# Patient Record
Sex: MALE | Race: NATIVE HAWAIIAN OR OTHER PACIFIC ISLANDER | NOT HISPANIC OR LATINO
[De-identification: names, ages, dates, MRNs, and addresses within clinical notes are randomized per-mention and may not be internally consistent; named-entity substitution may affect disease eponyms.]

---

## 2017-02-01 ENCOUNTER — OTHER (OUTPATIENT)
Age: 72
End: 2017-02-01

## 2017-03-22 ENCOUNTER — APPOINTMENT (OUTPATIENT)
Dept: SLEEP CENTER | Facility: CLINIC | Age: 72
End: 2017-03-22

## 2017-03-22 ENCOUNTER — OUTPATIENT (OUTPATIENT)
Dept: OUTPATIENT SERVICES | Facility: HOSPITAL | Age: 72
LOS: 1 days | End: 2017-03-22
Payer: MEDICARE

## 2017-03-22 DIAGNOSIS — Z98.89 OTHER SPECIFIED POSTPROCEDURAL STATES: Chronic | ICD-10-CM

## 2017-03-22 DIAGNOSIS — M71.21 SYNOVIAL CYST OF POPLITEAL SPACE [BAKER], RIGHT KNEE: Chronic | ICD-10-CM

## 2017-03-22 PROCEDURE — 95811 POLYSOM 6/>YRS CPAP 4/> PARM: CPT

## 2017-03-23 DIAGNOSIS — G47.33 OBSTRUCTIVE SLEEP APNEA (ADULT) (PEDIATRIC): ICD-10-CM

## 2017-04-10 ENCOUNTER — RESULT REVIEW (OUTPATIENT)
Age: 72
End: 2017-04-10

## 2017-04-17 ENCOUNTER — APPOINTMENT (OUTPATIENT)
Dept: PULMONOLOGY | Facility: CLINIC | Age: 72
End: 2017-04-17

## 2017-04-17 VITALS
OXYGEN SATURATION: 93 % | HEIGHT: 71 IN | DIASTOLIC BLOOD PRESSURE: 100 MMHG | SYSTOLIC BLOOD PRESSURE: 180 MMHG | BODY MASS INDEX: 32.9 KG/M2 | TEMPERATURE: 97.3 F | RESPIRATION RATE: 14 BRPM | HEART RATE: 75 BPM | WEIGHT: 235 LBS

## 2017-04-17 VITALS — DIASTOLIC BLOOD PRESSURE: 82 MMHG | SYSTOLIC BLOOD PRESSURE: 156 MMHG

## 2017-04-17 VITALS — OXYGEN SATURATION: 96 %

## 2017-04-17 DIAGNOSIS — F41.9 ANXIETY DISORDER, UNSPECIFIED: ICD-10-CM

## 2017-04-17 DIAGNOSIS — G47.00 INSOMNIA, UNSPECIFIED: ICD-10-CM

## 2017-04-17 RX ORDER — FOLIC ACID 1 MG/1
1 TABLET ORAL
Refills: 0 | Status: COMPLETED | COMMUNITY
End: 2017-04-17

## 2017-04-17 RX ORDER — VENLAFAXINE HYDROCHLORIDE 75 MG/1
75 CAPSULE, EXTENDED RELEASE ORAL
Refills: 0 | Status: COMPLETED | COMMUNITY
End: 2017-04-17

## 2017-06-12 ENCOUNTER — APPOINTMENT (OUTPATIENT)
Dept: PULMONOLOGY | Facility: CLINIC | Age: 72
End: 2017-06-12

## 2017-06-12 VITALS
BODY MASS INDEX: 31.69 KG/M2 | SYSTOLIC BLOOD PRESSURE: 150 MMHG | RESPIRATION RATE: 16 BRPM | OXYGEN SATURATION: 96 % | HEART RATE: 94 BPM | WEIGHT: 234 LBS | DIASTOLIC BLOOD PRESSURE: 86 MMHG | HEIGHT: 72 IN

## 2017-07-19 ENCOUNTER — APPOINTMENT (OUTPATIENT)
Dept: PULMONOLOGY | Facility: CLINIC | Age: 72
End: 2017-07-19

## 2017-07-19 VITALS
RESPIRATION RATE: 18 BRPM | OXYGEN SATURATION: 94 % | SYSTOLIC BLOOD PRESSURE: 170 MMHG | DIASTOLIC BLOOD PRESSURE: 90 MMHG | HEART RATE: 89 BPM | WEIGHT: 232 LBS | BODY MASS INDEX: 31.42 KG/M2 | TEMPERATURE: 96.7 F | HEIGHT: 72 IN

## 2017-07-19 RX ORDER — DICLOFENAC SODIUM 20 MG/G
2 SOLUTION TOPICAL
Qty: 112 | Refills: 0 | Status: DISCONTINUED | COMMUNITY
Start: 2017-03-23 | End: 2017-07-19

## 2017-07-19 RX ORDER — TAMSULOSIN HYDROCHLORIDE 0.4 MG/1
0.4 CAPSULE ORAL
Refills: 0 | Status: DISCONTINUED | COMMUNITY
End: 2017-07-19

## 2017-07-19 RX ORDER — LORAZEPAM 1 MG/1
1 TABLET ORAL
Refills: 0 | Status: DISCONTINUED | COMMUNITY
Start: 2017-06-12 | End: 2017-07-19

## 2017-07-19 RX ORDER — NAPROXEN 500 MG/1
500 TABLET, DELAYED RELEASE ORAL
Qty: 180 | Refills: 0 | Status: DISCONTINUED | COMMUNITY
Start: 2017-03-23 | End: 2017-07-19

## 2017-08-04 ENCOUNTER — APPOINTMENT (OUTPATIENT)
Dept: PULMONOLOGY | Facility: CLINIC | Age: 72
End: 2017-08-04

## 2019-11-19 ENCOUNTER — INPATIENT (INPATIENT)
Facility: HOSPITAL | Age: 74
LOS: 2 days | Discharge: HOME | DRG: 66 | End: 2019-11-22
Attending: PSYCHIATRY & NEUROLOGY | Admitting: PSYCHIATRY & NEUROLOGY
Payer: MEDICARE

## 2019-11-19 VITALS
RESPIRATION RATE: 17 BRPM | HEART RATE: 93 BPM | OXYGEN SATURATION: 96 % | WEIGHT: 220.46 LBS | TEMPERATURE: 98 F | HEIGHT: 71 IN | SYSTOLIC BLOOD PRESSURE: 175 MMHG | DIASTOLIC BLOOD PRESSURE: 93 MMHG

## 2019-11-19 DIAGNOSIS — Z98.89 OTHER SPECIFIED POSTPROCEDURAL STATES: Chronic | ICD-10-CM

## 2019-11-19 DIAGNOSIS — M71.21 SYNOVIAL CYST OF POPLITEAL SPACE [BAKER], RIGHT KNEE: Chronic | ICD-10-CM

## 2019-11-19 LAB
ALBUMIN SERPL ELPH-MCNC: 3.8 G/DL — SIGNIFICANT CHANGE UP (ref 3.3–5)
ALP SERPL-CCNC: 89 U/L — SIGNIFICANT CHANGE UP (ref 40–120)
ALT FLD-CCNC: 21 U/L — SIGNIFICANT CHANGE UP (ref 10–45)
ANION GAP SERPL CALC-SCNC: 10 MMOL/L — SIGNIFICANT CHANGE UP (ref 5–17)
APPEARANCE UR: CLEAR — SIGNIFICANT CHANGE UP
APTT BLD: 33.4 SEC — SIGNIFICANT CHANGE UP (ref 27.5–36.3)
AST SERPL-CCNC: 19 U/L — SIGNIFICANT CHANGE UP (ref 10–40)
BACTERIA # UR AUTO: PRESENT /HPF
BASOPHILS # BLD AUTO: 0.04 K/UL — SIGNIFICANT CHANGE UP (ref 0–0.2)
BASOPHILS NFR BLD AUTO: 0.2 % — SIGNIFICANT CHANGE UP (ref 0–2)
BILIRUB SERPL-MCNC: 0.2 MG/DL — SIGNIFICANT CHANGE UP (ref 0.2–1.2)
BILIRUB UR-MCNC: NEGATIVE — SIGNIFICANT CHANGE UP
BLD GP AB SCN SERPL QL: NEGATIVE — SIGNIFICANT CHANGE UP
BUN SERPL-MCNC: 19 MG/DL — SIGNIFICANT CHANGE UP (ref 7–23)
CALCIUM SERPL-MCNC: 9.8 MG/DL — SIGNIFICANT CHANGE UP (ref 8.4–10.5)
CHLORIDE SERPL-SCNC: 101 MMOL/L — SIGNIFICANT CHANGE UP (ref 96–108)
CO2 SERPL-SCNC: 25 MMOL/L — SIGNIFICANT CHANGE UP (ref 22–31)
COLOR SPEC: YELLOW — SIGNIFICANT CHANGE UP
CREAT SERPL-MCNC: 1.06 MG/DL — SIGNIFICANT CHANGE UP (ref 0.5–1.3)
DIFF PNL FLD: NEGATIVE — SIGNIFICANT CHANGE UP
EOSINOPHIL # BLD AUTO: 0.2 K/UL — SIGNIFICANT CHANGE UP (ref 0–0.5)
EOSINOPHIL NFR BLD AUTO: 1.2 % — SIGNIFICANT CHANGE UP (ref 0–6)
EPI CELLS # UR: SIGNIFICANT CHANGE UP /HPF (ref 0–5)
GLUCOSE SERPL-MCNC: 162 MG/DL — HIGH (ref 70–99)
GLUCOSE UR QL: NEGATIVE — SIGNIFICANT CHANGE UP
HCT VFR BLD CALC: 40.9 % — SIGNIFICANT CHANGE UP (ref 39–50)
HGB BLD-MCNC: 13 G/DL — SIGNIFICANT CHANGE UP (ref 13–17)
IMM GRANULOCYTES NFR BLD AUTO: 0.5 % — SIGNIFICANT CHANGE UP (ref 0–1.5)
INR BLD: 1.04 — SIGNIFICANT CHANGE UP (ref 0.88–1.16)
KETONES UR-MCNC: NEGATIVE — SIGNIFICANT CHANGE UP
LEUKOCYTE ESTERASE UR-ACNC: NEGATIVE — SIGNIFICANT CHANGE UP
LYMPHOCYTES # BLD AUTO: 19.7 % — SIGNIFICANT CHANGE UP (ref 13–44)
LYMPHOCYTES # BLD AUTO: 3.28 K/UL — SIGNIFICANT CHANGE UP (ref 1–3.3)
MCHC RBC-ENTMCNC: 27.7 PG — SIGNIFICANT CHANGE UP (ref 27–34)
MCHC RBC-ENTMCNC: 31.8 GM/DL — LOW (ref 32–36)
MCV RBC AUTO: 87 FL — SIGNIFICANT CHANGE UP (ref 80–100)
MONOCYTES # BLD AUTO: 1.19 K/UL — HIGH (ref 0–0.9)
MONOCYTES NFR BLD AUTO: 7.1 % — SIGNIFICANT CHANGE UP (ref 2–14)
NEUTROPHILS # BLD AUTO: 11.88 K/UL — HIGH (ref 1.8–7.4)
NEUTROPHILS NFR BLD AUTO: 71.3 % — SIGNIFICANT CHANGE UP (ref 43–77)
NITRITE UR-MCNC: NEGATIVE — SIGNIFICANT CHANGE UP
NRBC # BLD: 0 /100 WBCS — SIGNIFICANT CHANGE UP (ref 0–0)
PH UR: 5.5 — SIGNIFICANT CHANGE UP (ref 5–8)
PLATELET # BLD AUTO: 386 K/UL — SIGNIFICANT CHANGE UP (ref 150–400)
POTASSIUM SERPL-MCNC: 4.2 MMOL/L — SIGNIFICANT CHANGE UP (ref 3.5–5.3)
POTASSIUM SERPL-SCNC: 4.2 MMOL/L — SIGNIFICANT CHANGE UP (ref 3.5–5.3)
PROT SERPL-MCNC: 7.7 G/DL — SIGNIFICANT CHANGE UP (ref 6–8.3)
PROT UR-MCNC: ABNORMAL MG/DL
PROTHROM AB SERPL-ACNC: 11.8 SEC — SIGNIFICANT CHANGE UP (ref 10–12.9)
RBC # BLD: 4.7 M/UL — SIGNIFICANT CHANGE UP (ref 4.2–5.8)
RBC # FLD: 14.1 % — SIGNIFICANT CHANGE UP (ref 10.3–14.5)
RBC CASTS # UR COMP ASSIST: < 5 /HPF — SIGNIFICANT CHANGE UP
RH IG SCN BLD-IMP: POSITIVE — SIGNIFICANT CHANGE UP
SODIUM SERPL-SCNC: 136 MMOL/L — SIGNIFICANT CHANGE UP (ref 135–145)
SP GR SPEC: >=1.03 — SIGNIFICANT CHANGE UP (ref 1–1.03)
UROBILINOGEN FLD QL: 0.2 E.U./DL — SIGNIFICANT CHANGE UP
WBC # BLD: 16.68 K/UL — HIGH (ref 3.8–10.5)
WBC # FLD AUTO: 16.68 K/UL — HIGH (ref 3.8–10.5)
WBC UR QL: < 5 /HPF — SIGNIFICANT CHANGE UP

## 2019-11-19 PROCEDURE — 70450 CT HEAD/BRAIN W/O DYE: CPT | Mod: 26,59

## 2019-11-19 PROCEDURE — 70498 CT ANGIOGRAPHY NECK: CPT | Mod: 26

## 2019-11-19 PROCEDURE — 93010 ELECTROCARDIOGRAM REPORT: CPT

## 2019-11-19 PROCEDURE — 70496 CT ANGIOGRAPHY HEAD: CPT | Mod: 26

## 2019-11-19 PROCEDURE — 99285 EMERGENCY DEPT VISIT HI MDM: CPT

## 2019-11-19 RX ORDER — DEXTROSE 50 % IN WATER 50 %
12.5 SYRINGE (ML) INTRAVENOUS ONCE
Refills: 0 | Status: DISCONTINUED | OUTPATIENT
Start: 2019-11-19 | End: 2019-11-22

## 2019-11-19 RX ORDER — ENOXAPARIN SODIUM 100 MG/ML
40 INJECTION SUBCUTANEOUS EVERY 24 HOURS
Refills: 0 | Status: DISCONTINUED | OUTPATIENT
Start: 2019-11-20 | End: 2019-11-22

## 2019-11-19 RX ORDER — INSULIN LISPRO 100/ML
VIAL (ML) SUBCUTANEOUS
Refills: 0 | Status: DISCONTINUED | OUTPATIENT
Start: 2019-11-19 | End: 2019-11-22

## 2019-11-19 RX ORDER — ASPIRIN/CALCIUM CARB/MAGNESIUM 324 MG
325 TABLET ORAL ONCE
Refills: 0 | Status: COMPLETED | OUTPATIENT
Start: 2019-11-19 | End: 2019-11-19

## 2019-11-19 RX ORDER — LANOLIN ALCOHOL/MO/W.PET/CERES
5 CREAM (GRAM) TOPICAL AT BEDTIME
Refills: 0 | Status: DISCONTINUED | OUTPATIENT
Start: 2019-11-19 | End: 2019-11-22

## 2019-11-19 RX ORDER — DEXTROSE 50 % IN WATER 50 %
15 SYRINGE (ML) INTRAVENOUS ONCE
Refills: 0 | Status: DISCONTINUED | OUTPATIENT
Start: 2019-11-19 | End: 2019-11-22

## 2019-11-19 RX ORDER — CLOPIDOGREL BISULFATE 75 MG/1
75 TABLET, FILM COATED ORAL EVERY 24 HOURS
Refills: 0 | Status: DISCONTINUED | OUTPATIENT
Start: 2019-11-19 | End: 2019-11-22

## 2019-11-19 RX ORDER — ATORVASTATIN CALCIUM 80 MG/1
80 TABLET, FILM COATED ORAL AT BEDTIME
Refills: 0 | Status: DISCONTINUED | OUTPATIENT
Start: 2019-11-19 | End: 2019-11-22

## 2019-11-19 RX ORDER — GLUCAGON INJECTION, SOLUTION 0.5 MG/.1ML
1 INJECTION, SOLUTION SUBCUTANEOUS ONCE
Refills: 0 | Status: DISCONTINUED | OUTPATIENT
Start: 2019-11-19 | End: 2019-11-22

## 2019-11-19 RX ORDER — ASPIRIN/CALCIUM CARB/MAGNESIUM 324 MG
81 TABLET ORAL DAILY
Refills: 0 | Status: DISCONTINUED | OUTPATIENT
Start: 2019-11-20 | End: 2019-11-22

## 2019-11-19 RX ORDER — SODIUM CHLORIDE 9 MG/ML
1000 INJECTION, SOLUTION INTRAVENOUS
Refills: 0 | Status: DISCONTINUED | OUTPATIENT
Start: 2019-11-19 | End: 2019-11-22

## 2019-11-19 RX ADMIN — Medication 325 MILLIGRAM(S): at 21:41

## 2019-11-19 RX ADMIN — CLOPIDOGREL BISULFATE 75 MILLIGRAM(S): 75 TABLET, FILM COATED ORAL at 23:26

## 2019-11-19 RX ADMIN — Medication 5 MILLIGRAM(S): at 23:27

## 2019-11-19 RX ADMIN — ATORVASTATIN CALCIUM 80 MILLIGRAM(S): 80 TABLET, FILM COATED ORAL at 23:26

## 2019-11-19 NOTE — H&P ADULT - ASSESSMENT
74M with PMH psoriatic arthritis, HTN, DM, spinal stenosis, presents with dizziness x1 week, admitted for L cerebellar infarct. 74M with PMH psoriatic arthritis, HTN, DM, spinal stenosis, presents with dizziness x1 week, admitted for acute/subacute L cerebellar infarct.

## 2019-11-19 NOTE — ED PROVIDER NOTE - CLINICAL SUMMARY MEDICAL DECISION MAKING FREE TEXT BOX
large CVA cerebellar on mri- probably PICA dist- d/w Dr Hanley- admit to him [facility]- labs/CT ordered

## 2019-11-19 NOTE — H&P ADULT - PROBLEM SELECTOR PLAN 1
Pt with symptoms of intermittent dizziness, room spinning sensation x1 week, and unsteady gait (worse than baseline, walks with cane at baseline). Of note, patient had been having episodes of n/v while on fentanyl patch, unknown if related to cerebellar infarct. MRI outpatient with large left cerebellar infarct. Neuro exam fairly unremarkable without any signficant focal neuro deficits, mild dysmetria on left side. CT angio with no flow visualized in the right distal V1 and proximal V2 segments and the left distal V2 segments, which may represent high-grade stenoses versus occlusions. No large vessel occlusion.  - c/w Aspirin 81mg, Plavix 75mg qd  - c/w lipitor 80mg   - PT/OT Pt with symptoms of intermittent dizziness, room spinning sensation x1 week, and unsteady gait (worse than baseline, walks with cane at baseline). Of note, patient had been having episodes of n/v while on fentanyl patch, unknown if related to cerebellar infarct. MRI outpatient with large left cerebellar infarct. Neuro exam fairly unremarkable without any signficant focal neuro deficits, mild dysmetria on left side. CT angio with no flow visualized in the right distal V1 and proximal V2 segments and the left distal V2 segments, which may represent high-grade stenoses versus occlusions. No large vessel occlusion.  - c/w Aspirin 81mg, Plavix 75mg qd  - c/w lipitor 80mg   - PT/OT  - f/u lipid profile, Hb A1c, and TSH Pt with symptoms of intermittent dizziness, room spinning sensation x1 week, and unsteady gait (worse than baseline, walks with cane at baseline). Of note, patient had been having episodes of n/v while on fentanyl patch, unknown if related to cerebellar infarct. MRI outpatient with large left cerebellar infarct. Neuro exam fairly unremarkable without any signficant focal neuro deficits, mild dysmetria on left side. CT angio with no flow visualized in the right distal V1 and proximal V2 segments and the left distal V2 segments, which may represent high-grade stenoses versus occlusions. No large vessel occlusion.  - c/w Aspirin 81mg, Plavix 75mg qd  - c/w lipitor 80mg   - PT/OT  - f/u lipid profile, Hb A1c, and TSH  - f/u TTE

## 2019-11-19 NOTE — H&P ADULT - PROBLEM SELECTOR PLAN 4
Patient with history of HTN, on Losartan 100mg qd at home. Blood pressures elevated on arrival to 200s, improved to 170s while sleeping.   - BP goals 160-180s   - restart Losartan 100mg in AM

## 2019-11-19 NOTE — H&P ADULT - PROBLEM SELECTOR PLAN 2
WBC 16k in the ED, patient non-toxic appearing. May be reactive in setting of CVA  - trend CBC with diff

## 2019-11-19 NOTE — H&P ADULT - ATTENDING COMMENTS
Patient discussed by phone with ED attending and [facility] resident Dr. Escobedo per above.      74/M with DM with new dizziness and lateralzied sensory change since saturday >72h ago.  Ultimately obtained outpatient MRI today showing L inferior midline cerebellar stroke, presented to ED.  Exam reportedly with NIHSS 0 and only mild ataxia and unsteadiness on walking.  CTA showing likely vertebrobasilar stenosis.  Given no significant edema after 3 days and patients age, risk of large posterior fossa swelling is low, stable for admission to stepdown level of care for stroke workup.  STart ASA, plavix, statin. SBP ~200 in ED, past permissive hypertension phase, target modest BP reduction to SBP<180.    Masoud Hanley MD  Attending Neurointensivist

## 2019-11-19 NOTE — H&P ADULT - NSHPPHYSICALEXAM_GEN_ALL_CORE
.  VITAL SIGNS:  T(C): 36.8 (11-20-19 @ 01:58), Max: 36.8 (11-19-19 @ 21:55)  T(F): 98.3 (11-20-19 @ 01:58), Max: 98.3 (11-20-19 @ 01:58)  HR: 60 (11-20-19 @ 03:49) (60 - 93)  BP: 184/77 (11-20-19 @ 03:49) (175/93 - 196/84)  BP(mean): 110 (11-20-19 @ 00:09) (110 - 110)  RR: 18 (11-20-19 @ 03:49) (17 - 18)  SpO2: 95% (11-20-19 @ 03:49) (95% - 97%)  Wt(kg): --    PHYSICAL EXAM:    Constitutional: WDWN resting comfortably in bed; NAD  Head: NC/AT  Eyes: PERRL, EOMI, anicteric sclera  ENT: no nasal discharge; uvula midline, no oropharyngeal erythema or exudates; MMM  Neck: supple; no JVD or thyromegaly  Respiratory: CTA B/L; no W/R/R, no retractions  Cardiac: +S1/S2; RRR; no M/R/G; PMI non-displaced  Gastrointestinal: soft, NT/ND; no rebound or guarding; +BSx4  Genitourinary: normal external genitalia  Back: spine midline, no bony tenderness or step-offs; no CVAT B/L  Extremities: WWP, no clubbing or cyanosis; no peripheral edema  Musculoskeletal: NROM x4; no joint swelling, tenderness or erythema  Vascular: 2+ radial, femoral, DP/PT pulses B/L  Dermatologic: skin warm, dry and intact; no rashes, wounds, or scars  Lymphatic: no submandibular or cervical LAD  Neurologic: AAOx3; CNII-XII grossly intact; no focal deficits  Neuro:  Mental status: Awake, alert and oriented x3. Follows commands. Recent and remote memory intact.  Naming, repetition and comprehension intact.  Attention/concentration intact.  No dysarthria, no aphasia.      Cranial nerves: Pupils equally round and reactive to light, visual fields full, no nystagmus, extraocular muscles intact, V1 through V3 intact bilaterally and symmetric, no facial droop, hearing intact to finger rub, palate elevation symmetric, tongue was midline, sternocleidomastoid/shoulder shrug strength bilaterally 5/5.    Motor:  No pronator drift. No fix. Normal bulk and tone, strength 5/5 in bilateral upper and lower extremities.   strength 5/5.    Sensation: Intact to light touch, proprioception, vibration, temperature, pinprick.  No neglect.   Coordination: No dysmetria on finger-to-nose and heel-to-shin.  No clumsiness. Rapid alternating movements intact and symmetric.   Reflexes:absent Babinski bilaterally  Gait: Narrow and steady. No ataxia.   Psychiatric: affect and characteristics of appearance, verbalizations, behaviors are appropriate

## 2019-11-19 NOTE — H&P ADULT - PROBLEM SELECTOR PLAN 5
Pt with newly diagnosed DM, last A1c ~6.9 previously 10.1. On Metformin 750mg at home  - hold metformin  - [facility]  - f/u A1c   - carb consistent diet

## 2019-11-19 NOTE — ED ADULT NURSE NOTE - OBJECTIVE STATEMENT
Patient wet to city MD today for stroke sx of L leg numbness and dizziness since saturday, had MRI done, diagnosed with  stroke, sent to ED for evaluation, patient denies symptoms at the moment.

## 2019-11-19 NOTE — H&P ADULT - HISTORY OF PRESENT ILLNESS
74M with HTN, newly diagnosed DM (on Metformin), psoriatic arthritis, spinal stenosis (s/p laminectomy 5 years ago at [facility], walks with cane at baseline), presents with intermittent dizziness for the past week. However, he noticed on Saturday,~3 days ago, while he was sitting at the desk, he developed sudden onset left leg numbness and perioral numbness. Initially he thought it was related to low blood sugar, he had some food/juice which helped with the symptoms. However upon getting up, he developed dizziness. He took the day off on Sunday and rested all day, and did not have any  symptoms that day. However on Monday, he did his usual activities, and was working from home. Around 3 pm on Monday upon getting up from laying down for a while, he developed room spinning sensation/dizziness and sustained a fall and hit the back of his head, without any loss of consciousness. Ultimately, went to urgent care and the PCP, who then did MRI brain with 74M with HTN, newly diagnosed DM (on Metformin), psoriatic arthritis, spinal stenosis (s/p laminectomy 5 years ago at [facility], walks with cane at baseline), presents with intermittent dizziness for the past week. However, he noticed on Saturday,~3 days ago, while he was sitting at the desk, he developed sudden onset left leg numbness and perioral numbness. Initially he thought it was related to low blood sugar, he had some food/juice which helped with the symptoms. However upon getting up, he developed dizziness. He took the day off on Sunday and rested all day, and did not have any  symptoms that day. However on Monday, he did his usual activities, and was working from home. Around 3 pm on Monday upon getting up from laying down for a while, he developed room spinning sensation/dizziness and sustained a fall and hit the back of his head, without any loss of consciousness. Ultimately, went to urgent care and the PCP, who then did MRI brain which revealed L cerebellar infarct. Of note, patient has been having intermittent episodes of NBNB, after being on a fentanyl patch for his spinal stenosis/chronic back pain. After being on fentanyl for a few weeks, he would discontinue therapy 2/2 nausea/vomiting. Last fentanyl patch removed ~2 weeks. ago. Otherwise denies fevers, chills, chest pain, shortness of breath, recent illnesses/ hospitalizations, n/v/d, abd pain, dysuria, hematuria, or any other complaints at this time.     In the ED, vitals were 98F, HR 93/min, /93 mmHg, RR 17, 96% on room air. CT angio performed which revealed 74M with HTN, newly diagnosed DM (on Metformin), psoriatic arthritis, spinal stenosis (s/p laminectomy 5 years ago at [facility], walks with cane at baseline), presents with intermittent dizziness for the past week. However, he noticed on Saturday,~3 days ago, while he was sitting at the desk, he developed sudden onset left leg numbness and perioral numbness. Initially he thought it was related to low blood sugar, he had some food/juice which helped with the symptoms. However upon getting up, he developed dizziness. He took the day off on Sunday and rested all day, and did not have any  symptoms that day. However on Monday, he did his usual activities, and was working from home. Around 3 pm on Monday upon getting up from laying down for a while, he developed room spinning sensation/dizziness and sustained a fall and hit the back of his head, without any loss of consciousness. Ultimately, went to urgent care and the PCP, who then did MRI brain which revealed L cerebellar infarct. Of note, patient has been having intermittent episodes of NBNB, after being on a fentanyl patch for his spinal stenosis/chronic back pain. After being on fentanyl for a few weeks, he would discontinue therapy 2/2 nausea/vomiting. Last fentanyl patch removed ~2 weeks. ago. Otherwise denies fevers, chills, chest pain, shortness of breath, recent illnesses/ hospitalizations, n/v/d, abd pain, dysuria, hematuria, or any other complaints at this time.     In the ED, vitals were 98F, HR 93/min, /93 mmHg, RR 17, 96% on room air. CT angio performed which revealed acute/subacute infarction inferomedial left cerebellum. CT angio performed which revealed no flow in right distal V1 and prox V2 segments and left distal V2 segments which may present high grade stenosis vs. occlusions. Labs s/f leukoctyosis WBC 16k, BMP with elevated glucose to 160s. 74M with HTN, newly diagnosed DM (on Metformin), psoriatic arthritis, spinal stenosis (s/p laminectomy 5 years ago at [facility], walks with cane at baseline), presents with intermittent dizziness for the past week. However, he noticed on Saturday,~3 days ago, while he was sitting at the desk, he developed sudden onset left leg numbness and perioral numbness. Initially he thought it was related to low blood sugar, he had some food/juice which helped with the symptoms. However upon getting up, he developed dizziness. He took the day off on Sunday and rested all day, and did not have any  symptoms that day. However on Monday, he did his usual activities, and was working from home. Around 3 pm on Monday upon getting up from laying down for a while, he developed room spinning sensation/dizziness and sustained a fall and hit the back of his head, without any loss of consciousness. Ultimately, went to urgent care and the PCP, who then did MRI brain which revealed L cerebellar infarct. Of note, patient has been having intermittent episodes of NBNB, after being on a fentanyl patch for his spinal stenosis/chronic back pain. After being on fentanyl for a few weeks, he would discontinue therapy 2/2 nausea/vomiting. Last fentanyl patch removed ~2 weeks. ago. Otherwise denies fevers, chills, chest pain, shortness of breath, recent illnesses/ hospitalizations, n/v/d, abd pain, dysuria, hematuria, or any other complaints at this time.     In the ED, vitals were 98F, HR 93/min, /93 mmHg, RR 17, 96% on room air. CT angio performed which revealed acute/subacute infarction inferomedial left cerebellum. CT angio performed which revealed no flow in right distal V1 and prox V2 segments and left distal V2 segments which may present high grade stenosis vs. occlusions. Labs s/f leukoctyosis WBC 16k, BMP with elevated glucose to 160s. In the ED, received 325 mg ASA, and 75mg Plavix. Transferred to  for further monitoring

## 2019-11-19 NOTE — H&P ADULT - PROBLEM SELECTOR PLAN 6
Pt w/ history of depression, anxiety. Takes Venlafaxine 37.5 mg every other day, Ativan 1mg BID, and ambien 10mg qhs.   - resume venlafaxine 37.5mg every other day.   - hold Ativan  in setting of acute CVA

## 2019-11-19 NOTE — H&P ADULT - NSHPLABSRESULTS_GEN_ALL_CORE
H&P done.   JARROD TAPIA PA-C on 11/1/2017 at 11:56 AM
13.0   16.68 )-----------( 386      ( 19 Nov 2019 21:22 )             40.9       11-19    136  |  101  |  19  ----------------------------<  162<H>  4.2   |  25  |  1.06    Ca    9.8      19 Nov 2019 21:21    TPro  7.7  /  Alb  3.8  /  TBili  0.2  /  DBili  x   /  AST  19  /  ALT  21  /  AlkPhos  89  11-19              Urinalysis Basic - ( 19 Nov 2019 21:41 )    Color: Yellow / Appearance: Clear / SG: >=1.030 / pH: x  Gluc: x / Ketone: NEGATIVE  / Bili: Negative / Urobili: 0.2 E.U./dL   Blood: x / Protein: Trace mg/dL / Nitrite: NEGATIVE   Leuk Esterase: NEGATIVE / RBC: < 5 /HPF / WBC < 5 /HPF   Sq Epi: x / Non Sq Epi: 0-5 /HPF / Bacteria: Present /HPF        PT/INR - ( 19 Nov 2019 21:22 )   PT: 11.8 sec;   INR: 1.04          PTT - ( 19 Nov 2019 21:22 )  PTT:33.4 sec    Lactate Trend            CAPILLARY BLOOD GLUCOSE      POCT Blood Glucose.: 123 mg/dL (19 Nov 2019 22:46)

## 2019-11-19 NOTE — H&P ADULT - PROBLEM SELECTOR PLAN 3
Pt with h/o Psoriatic arthritis, takes Cosentyx monthly and meloxicam daily. Currently joints without swelling, edema, no evidence of flare   - tylenol PRN for pain Pt with h/o Psoriatic arthritis, takes Cosentyx monthly and meloxicam daily. Currently joints without swelling, edema, no evidence of flare   - Tylenol PRN for pain

## 2019-11-19 NOTE — H&P ADULT - NSHPSOCIALHISTORY_GEN_ALL_CORE
Marital Status:  (   )    (   ) Single    (   )    (  )   Lives with: (  ) alone  (  ) children   (  ) spouse   (  ) parents  (  ) other  Recent Travel: No recent travel  Occupation:    Substance Use (street drugs): ( x ) never used  (  ) other:  Tobacco Usage:  ( x  ) never smoked   (   ) former smoker   (   ) current smoker  (     ) pack year  Alcohol Usage: None Marital Status:  ( X  )    (   ) Single    (   )    (  )   Lives with: (  ) alone  (  ) children   (X  ) spouse   (  ) parents  (  ) other  Recent Travel: No recent travel  Occupation:     Substance Use (street drugs): (  ) never used  ( X ) other: occasional THC mint chocolate use  Tobacco Usage:  (  ) never smoked   ( X  ) former smoker - 2 ppd for 11 years, quit 40 years ago  (   ) current smoker  (     ) pack year  Alcohol Usage: None

## 2019-11-19 NOTE — ED ADULT NURSE NOTE - PSH
Baker's cyst of knee, right  1977  S/P appendectomy  1977  S/P colonoscopy with polypectomy  2013 adenoma  S/P laparoscopic cholecystectomy  feb 2014

## 2019-11-19 NOTE — H&P ADULT - PROBLEM SELECTOR PLAN 7
F: No IVF  E: Replete PRN  N: DASH/TLC w/ Carb consistent  DVT ppx: SCDs, lovenox  Code: Full Code;  Dispo: [facility]

## 2019-11-19 NOTE — ED PROVIDER NOTE - OBJECTIVE STATEMENT
74 M hx of HTN, DM- co dizziness,n/v- 3 nights ago developed severe dizziness, spinning- fell hit the back of his head- no loc  also w n/v- L leg numbness- the L leg numbness has resolved- his dizziness is mild-outpx mri shows large cerebellar cva  probable PICA distribution  no exac/allev factors  mild-moderate severity

## 2019-11-19 NOTE — H&P ADULT - NSICDXPASTSURGICALHX_GEN_ALL_CORE_FT
PAST SURGICAL HISTORY:  Baker's cyst of knee, right 1977    S/P appendectomy 1977    S/P colonoscopy with polypectomy 2013 adenoma    S/P laparoscopic cholecystectomy feb 2014

## 2019-11-20 DIAGNOSIS — R63.8 OTHER SYMPTOMS AND SIGNS CONCERNING FOOD AND FLUID INTAKE: ICD-10-CM

## 2019-11-20 DIAGNOSIS — I63.9 CEREBRAL INFARCTION, UNSPECIFIED: ICD-10-CM

## 2019-11-20 DIAGNOSIS — D72.829 ELEVATED WHITE BLOOD CELL COUNT, UNSPECIFIED: ICD-10-CM

## 2019-11-20 DIAGNOSIS — E66.9 OBESITY, UNSPECIFIED: ICD-10-CM

## 2019-11-20 DIAGNOSIS — F32.9 MAJOR DEPRESSIVE DISORDER, SINGLE EPISODE, UNSPECIFIED: ICD-10-CM

## 2019-11-20 DIAGNOSIS — I10 ESSENTIAL (PRIMARY) HYPERTENSION: ICD-10-CM

## 2019-11-20 DIAGNOSIS — E11.9 TYPE 2 DIABETES MELLITUS WITHOUT COMPLICATIONS: ICD-10-CM

## 2019-11-20 DIAGNOSIS — L40.50 ARTHROPATHIC PSORIASIS, UNSPECIFIED: ICD-10-CM

## 2019-11-20 DIAGNOSIS — Z91.89 OTHER SPECIFIED PERSONAL RISK FACTORS, NOT ELSEWHERE CLASSIFIED: ICD-10-CM

## 2019-11-20 DIAGNOSIS — D64.9 ANEMIA, UNSPECIFIED: ICD-10-CM

## 2019-11-20 LAB
ANION GAP SERPL CALC-SCNC: 9 MMOL/L — SIGNIFICANT CHANGE UP (ref 5–17)
BASOPHILS # BLD AUTO: 0.06 K/UL — SIGNIFICANT CHANGE UP (ref 0–0.2)
BASOPHILS NFR BLD AUTO: 0.4 % — SIGNIFICANT CHANGE UP (ref 0–2)
BUN SERPL-MCNC: 19 MG/DL — SIGNIFICANT CHANGE UP (ref 7–23)
CALCIUM SERPL-MCNC: 9.4 MG/DL — SIGNIFICANT CHANGE UP (ref 8.4–10.5)
CHLORIDE SERPL-SCNC: 103 MMOL/L — SIGNIFICANT CHANGE UP (ref 96–108)
CO2 SERPL-SCNC: 24 MMOL/L — SIGNIFICANT CHANGE UP (ref 22–31)
CREAT SERPL-MCNC: 1.01 MG/DL — SIGNIFICANT CHANGE UP (ref 0.5–1.3)
EOSINOPHIL # BLD AUTO: 0.21 K/UL — SIGNIFICANT CHANGE UP (ref 0–0.5)
EOSINOPHIL NFR BLD AUTO: 1.5 % — SIGNIFICANT CHANGE UP (ref 0–6)
GLUCOSE BLDC GLUCOMTR-MCNC: 113 MG/DL — HIGH (ref 70–99)
GLUCOSE BLDC GLUCOMTR-MCNC: 134 MG/DL — HIGH (ref 70–99)
GLUCOSE BLDC GLUCOMTR-MCNC: 154 MG/DL — HIGH (ref 70–99)
GLUCOSE SERPL-MCNC: 151 MG/DL — HIGH (ref 70–99)
HCT VFR BLD CALC: 39.8 % — SIGNIFICANT CHANGE UP (ref 39–50)
HCV AB S/CO SERPL IA: 0.19 S/CO — SIGNIFICANT CHANGE UP
HCV AB SERPL-IMP: SIGNIFICANT CHANGE UP
HGB BLD-MCNC: 12.8 G/DL — LOW (ref 13–17)
IMM GRANULOCYTES NFR BLD AUTO: 0.4 % — SIGNIFICANT CHANGE UP (ref 0–1.5)
LYMPHOCYTES # BLD AUTO: 2.97 K/UL — SIGNIFICANT CHANGE UP (ref 1–3.3)
LYMPHOCYTES # BLD AUTO: 20.7 % — SIGNIFICANT CHANGE UP (ref 13–44)
MAGNESIUM SERPL-MCNC: 1.9 MG/DL — SIGNIFICANT CHANGE UP (ref 1.6–2.6)
MCHC RBC-ENTMCNC: 27.9 PG — SIGNIFICANT CHANGE UP (ref 27–34)
MCHC RBC-ENTMCNC: 32.2 GM/DL — SIGNIFICANT CHANGE UP (ref 32–36)
MCV RBC AUTO: 86.9 FL — SIGNIFICANT CHANGE UP (ref 80–100)
MONOCYTES # BLD AUTO: 0.95 K/UL — HIGH (ref 0–0.9)
MONOCYTES NFR BLD AUTO: 6.6 % — SIGNIFICANT CHANGE UP (ref 2–14)
NEUTROPHILS # BLD AUTO: 10.11 K/UL — HIGH (ref 1.8–7.4)
NEUTROPHILS NFR BLD AUTO: 70.4 % — SIGNIFICANT CHANGE UP (ref 43–77)
NRBC # BLD: 0 /100 WBCS — SIGNIFICANT CHANGE UP (ref 0–0)
PLATELET # BLD AUTO: 323 K/UL — SIGNIFICANT CHANGE UP (ref 150–400)
POTASSIUM SERPL-MCNC: 3.9 MMOL/L — SIGNIFICANT CHANGE UP (ref 3.5–5.3)
POTASSIUM SERPL-SCNC: 3.9 MMOL/L — SIGNIFICANT CHANGE UP (ref 3.5–5.3)
RBC # BLD: 4.58 M/UL — SIGNIFICANT CHANGE UP (ref 4.2–5.8)
RBC # FLD: 14.5 % — SIGNIFICANT CHANGE UP (ref 10.3–14.5)
SODIUM SERPL-SCNC: 136 MMOL/L — SIGNIFICANT CHANGE UP (ref 135–145)
WBC # BLD: 14.36 K/UL — HIGH (ref 3.8–10.5)
WBC # FLD AUTO: 14.36 K/UL — HIGH (ref 3.8–10.5)

## 2019-11-20 PROCEDURE — 99223 1ST HOSP IP/OBS HIGH 75: CPT | Mod: GC

## 2019-11-20 PROCEDURE — 93306 TTE W/DOPPLER COMPLETE: CPT | Mod: 26

## 2019-11-20 PROCEDURE — 99223 1ST HOSP IP/OBS HIGH 75: CPT

## 2019-11-20 RX ORDER — VENLAFAXINE HCL 75 MG
37.5 CAPSULE, EXT RELEASE 24 HR ORAL
Refills: 0 | Status: DISCONTINUED | OUTPATIENT
Start: 2019-11-20 | End: 2019-11-20

## 2019-11-20 RX ORDER — VENLAFAXINE HCL 75 MG
75 CAPSULE, EXT RELEASE 24 HR ORAL DAILY
Refills: 0 | Status: DISCONTINUED | OUTPATIENT
Start: 2019-11-20 | End: 2019-11-20

## 2019-11-20 RX ORDER — VENLAFAXINE HCL 75 MG
37.5 CAPSULE, EXT RELEASE 24 HR ORAL
Refills: 0 | Status: DISCONTINUED | OUTPATIENT
Start: 2019-11-20 | End: 2019-11-22

## 2019-11-20 RX ORDER — LOSARTAN POTASSIUM 100 MG/1
100 TABLET, FILM COATED ORAL DAILY
Refills: 0 | Status: DISCONTINUED | OUTPATIENT
Start: 2019-11-20 | End: 2019-11-22

## 2019-11-20 RX ORDER — AMLODIPINE BESYLATE 2.5 MG/1
5 TABLET ORAL EVERY 24 HOURS
Refills: 0 | Status: DISCONTINUED | OUTPATIENT
Start: 2019-11-20 | End: 2019-11-22

## 2019-11-20 RX ADMIN — Medication 2: at 12:29

## 2019-11-20 RX ADMIN — Medication 5 MILLIGRAM(S): at 21:59

## 2019-11-20 RX ADMIN — ATORVASTATIN CALCIUM 80 MILLIGRAM(S): 80 TABLET, FILM COATED ORAL at 21:59

## 2019-11-20 RX ADMIN — Medication 81 MILLIGRAM(S): at 12:30

## 2019-11-20 RX ADMIN — LOSARTAN POTASSIUM 100 MILLIGRAM(S): 100 TABLET, FILM COATED ORAL at 06:44

## 2019-11-20 RX ADMIN — AMLODIPINE BESYLATE 5 MILLIGRAM(S): 2.5 TABLET ORAL at 16:23

## 2019-11-20 RX ADMIN — ENOXAPARIN SODIUM 40 MILLIGRAM(S): 100 INJECTION SUBCUTANEOUS at 06:44

## 2019-11-20 RX ADMIN — CLOPIDOGREL BISULFATE 75 MILLIGRAM(S): 75 TABLET, FILM COATED ORAL at 23:25

## 2019-11-20 NOTE — PHYSICAL THERAPY INITIAL EVALUATION ADULT - MODALITIES TREATMENT COMMENTS
smile symmetrical, tongue midline, eyes open/close intact bilaterally, eyebrows raised intact bilaterally, puffs cheeks WNL bilaterally, SILT face, shoulder shrug WNL bilaterally, head rotation intact. Vision: Quadrant test WNL bilaterally, H test WNL.  Coordination: Finger opposition WNL bilaterally

## 2019-11-20 NOTE — PROGRESS NOTE ADULT - PROBLEM SELECTOR PLAN 1
Pt with symptoms of intermittent dizziness, room spinning sensation x1 week, and unsteady gait (worse than baseline, walks with cane at baseline). Of note, patient had been having episodes of n/v while on fentanyl patch, unknown if related to cerebellar infarct. MRI outpatient with large left cerebellar infarct. Neuro exam fairly unremarkable without any signficant focal neuro deficits, mild dysmetria on left side. CT angio with no flow visualized in the right distal V1 and proximal V2 segments and the left distal V2 segments, which may represent high-grade stenoses versus occlusions. No large vessel occlusion.  - c/w Aspirin 81mg, Plavix 75mg qd  - c/w lipitor 80mg   - PT/OT  - f/u lipid profile, Hb A1c, and TSH  - f/u TTE

## 2019-11-20 NOTE — PHYSICAL THERAPY INITIAL EVALUATION ADULT - ADDITIONAL COMMENTS
Patient lives in elevator building with no steps to enter building. Ambulated with cane PTA. Patient denies wearing glasses. Patient is right handed Patient lives in elevator building with no steps to enter building. Ambulated with cane PTA. Patient sustained a fall a day before being admitted and hit his head. Patient denies wearing glasses. Patient is right handed

## 2019-11-20 NOTE — CONSULT NOTE ADULT - ASSESSMENT
per Neurology    74M with PMH psoriatic arthritis, HTN, DM, spinal stenosis, presents with dizziness x1 week, admitted for acute/subacute L cerebellar infarct.     Problem/Plan - 1:  ·  Problem: Cerebellar infarct.  Plan: Pt with symptoms of intermittent dizziness, room spinning sensation x1 week, and unsteady gait (worse than baseline, walks with cane at baseline). Of note, patient had been having episodes of n/v while on fentanyl patch, unknown if related to cerebellar infarct. MRI outpatient with large left cerebellar infarct. Neuro exam fairly unremarkable without any signficant focal neuro deficits, mild dysmetria on left side. CT angio with no flow visualized in the right distal V1 and proximal V2 segments and the left distal V2 segments, which may represent high-grade stenoses versus occlusions. No large vessel occlusion.  - c/w Aspirin 81mg, Plavix 75mg qd  - c/w lipitor 80mg   - f/u lipid profile, Hb A1c, and TSH  - f/u TTE.     Problem/Plan - 2:  ·  Problem: Leukocytosis.  Plan: WBC 16k in the ED, patient non-toxic appearing. May be reactive in setting of CVA  - trend CBC with diff.     Problem/Plan - 3:  ·  Problem: Psoriatic arthritis.  Plan: Pt with h/o Psoriatic arthritis, takes Cosentyx monthly and meloxicam daily. Currently joints without swelling, edema, no evidence of flare   - Tylenol PRN for pain.     Problem/Plan - 4:  ·  Problem: Hypertension.  Plan: Patient with history of HTN, on Losartan 100mg qd at home. Blood pressures elevated on arrival to 200s, improved to 170s while sleeping.   - BP goals 160-180s   - restart Losartan 100mg in AM.     Problem/Plan - 5:  ·  Problem: Diabetes mellitus.  Plan: Pt with newly diagnosed DM, last A1c ~6.9 previously 10.1. On Metformin 750mg at home  - hold metformin  - MISS  - f/u A1c   - carb consistent diet.     Problem/Plan - 6:  Problem: Depression. Plan: Pt w/ history of depression, anxiety. Takes Venlafaxine 37.5 mg every other day, Ativan 1mg BID, and ambien 10mg qhs.   - resume venlafaxine 37.5mg every other day.   - hold Ativan  in setting of acute CVA.    Problem/Plan - 7:  ·  Problem: Nutrition, metabolism, and development symptoms.  Plan: F: No IVF  E: Replete PRN  N: DASH/TLC w/ Carb consistent  DVT ppx: SCDs, lovenox  Code: Full Code;  Dispo: [facility].

## 2019-11-20 NOTE — OCCUPATIONAL THERAPY INITIAL EVALUATION ADULT - COORDINATION ASSESSED, REHAB EVAL
Grossly intact UEs, LEs/finger to nose/heel to shin heel to shin/finger to nose/Grossly intact UEs, LEs

## 2019-11-20 NOTE — PROGRESS NOTE ADULT - SUBJECTIVE AND OBJECTIVE BOX
INTERVAL HPI/OVERNIGHT EVENTS:    SUBJECTIVE: Patient seen and examined at bedside.    VITAL SIGNS:  ICU Vital Signs Last 24 Hrs  T(C): 36.8 (20 Nov 2019 17:53), Max: 36.8 (19 Nov 2019 21:55)  T(F): 98.3 (20 Nov 2019 17:53), Max: 98.3 (20 Nov 2019 01:58)  HR: 72 (20 Nov 2019 16:00) (60 - 93)  BP: 198/87 (20 Nov 2019 17:45) (175/93 - 211/83)  BP(mean): 110 (20 Nov 2019 00:09) (110 - 110)  ABP: --  ABP(mean): --  RR: 18 (20 Nov 2019 06:57) (17 - 18)  SpO2: 95% (20 Nov 2019 16:00) (95% - 97%)        11-19 @ 07:01  -  11-20 @ 07:00  --------------------------------------------------------  IN: 0 mL / OUT: 525 mL / NET: -525 mL      CAPILLARY BLOOD GLUCOSE      POCT Blood Glucose.: 113 mg/dL (20 Nov 2019 17:33)     PHYSICAL EXAM:   Constitutional: WDWN resting comfortably in bed; NAD  Head: NC/AT  Eyes: PERRL, EOMI, anicteric sclera  ENT: no nasal discharge; uvula midline, no oropharyngeal erythema or exudates; MMM  Neck: supple; no JVD or thyromegaly  Respiratory: CTA B/L; no W/R/R, no retractions  Cardiac: +S1/S2; RRR; no M/R/G; PMI non-displaced  Gastrointestinal: soft, NT/ND; no rebound or guarding; +BSx4  Genitourinary: normal external genitalia  Back: spine midline, no bony tenderness or step-offs; no CVAT B/L  Extremities: WWP, no clubbing or cyanosis; no peripheral edema  Musculoskeletal: NROM x4; no joint swelling, tenderness or erythema  Vascular: 2+ radial, femoral, DP/PT pulses B/L  Dermatologic: skin warm, dry and intact; no rashes, wounds, or scars  Lymphatic: no submandibular or cervical LAD  Neurologic: AAOx3; CNII-XII grossly intact; no focal deficits  Neuro: Physical exam: General: No acute distress, awake and alert Cardiovascular: Regular rate and rhythm, no murmurs, rubs, or gallops. No bruits Pulmonary: Anterior breath sounds clear bilaterally, no crackles or wheezing. No use of accessory muscles Extremities: Radial and DP pulses +2, no edema  Neurologic: -Mental status: Awake, alert, oriented to person, place, and time. Speech is fluent with intact naming, repetition, and comprehension, no dysarthria. Recent and remote memory intact. Follows commands. Attention/concentration intact. Fund of knowledge appropriate. -Cranial nerves:  II: Visual fields are full to confrontation. III, IV, VI: Extraocular movements are intact without nystagmus. Pupils equally round and reactive to light V:  Facial sensation V1-V3 equal and intact  VII: Face is symmetric with normal eye closure and smile VIII: Hearing is bilaterally intact to finger rub IX, X: Uvula is midline and soft palate rises symmetrically XI: Head turning and shoulder shrug are intact. XII: Tongue protrudes midline Motor: Normal bulk and tone. No pronator drift. Strength bilateral upper extremity 5/5, bilateral lower extremities 5/5. Rapid alternating movements intact and symmetric Sensation: Intact to light touch bilaterally. No neglect or extinction on double simultaneous testing. Coordination: No dysmetria on finger-to-nose and heel-to-shin bilaterally  	    MEDICATIONS:  MEDICATIONS  (STANDING):  amLODIPine   Tablet 5 milliGRAM(s) Oral every 24 hours  aspirin enteric coated 81 milliGRAM(s) Oral daily  atorvastatin 80 milliGRAM(s) Oral at bedtime  clopidogrel Tablet 75 milliGRAM(s) Oral every 24 hours  dextrose 5%. 1000 milliLiter(s) (50 mL/Hr) IV Continuous <Continuous>  dextrose 50% Injectable 12.5 Gram(s) IV Push once  enoxaparin Injectable 40 milliGRAM(s) SubCutaneous every 24 hours  insulin lispro (HumaLOG) corrective regimen sliding scale   SubCutaneous Before meals and at bedtime  losartan 100 milliGRAM(s) Oral daily  melatonin 5 milliGRAM(s) Oral at bedtime  venlafaxine XR. 37.5 milliGRAM(s) Oral <User Schedule>    MEDICATIONS  (PRN):  dextrose 40% Gel 15 Gram(s) Oral once PRN Blood Glucose LESS THAN 70 milliGRAM(s)/deciliter  glucagon  Injectable 1 milliGRAM(s) IntraMuscular once PRN Glucose LESS THAN 70 milligrams/deciliter      ALLERGIES:  Allergies    No Known Allergies    Intolerances        LABS:                        12.8   14.36 )-----------( 323      ( 20 Nov 2019 07:35 )             39.8     11-20    136  |  103  |  19  ----------------------------<  151<H>  3.9   |  24  |  1.01    Ca    9.4      20 Nov 2019 07:35  Mg     1.9     11-20    TPro  7.7  /  Alb  3.8  /  TBili  0.2  /  DBili  x   /  AST  19  /  ALT  21  /  AlkPhos  89  11-19    PT/INR - ( 19 Nov 2019 21:22 )   PT: 11.8 sec;   INR: 1.04          PTT - ( 19 Nov 2019 21:22 )  PTT:33.4 sec  Urinalysis Basic - ( 19 Nov 2019 21:41 )    Color: Yellow / Appearance: Clear / SG: >=1.030 / pH: x  Gluc: x / Ketone: NEGATIVE  / Bili: Negative / Urobili: 0.2 E.U./dL   Blood: x / Protein: Trace mg/dL / Nitrite: NEGATIVE   Leuk Esterase: NEGATIVE / RBC: < 5 /HPF / WBC < 5 /HPF   Sq Epi: x / Non Sq Epi: 0-5 /HPF / Bacteria: Present /HPF        RADIOLOGY & ADDITIONAL TESTS: Reviewed. INTERVAL HPI/OVERNIGHT EVENTS:    SUBJECTIVE: Patient seen and examined at bedside.    VITAL SIGNS:  ICU Vital Signs Last 24 Hrs  T(C): 36.8 (20 Nov 2019 17:53), Max: 36.8 (19 Nov 2019 21:55)  T(F): 98.3 (20 Nov 2019 17:53), Max: 98.3 (20 Nov 2019 01:58)  HR: 72 (20 Nov 2019 16:00) (60 - 93)  BP: 198/87 (20 Nov 2019 17:45) (175/93 - 211/83)  BP(mean): 110 (20 Nov 2019 00:09) (110 - 110)  RR: 18 (20 Nov 2019 06:57) (17 - 18)  SpO2: 95% (20 Nov 2019 16:00) (95% - 97%)        11-19 @ 07:01  -  11-20 @ 07:00  --------------------------------------------------------  IN: 0 mL / OUT: 525 mL / NET: -525 mL      CAPILLARY BLOOD GLUCOSE  POCT Blood Glucose.: 113 mg/dL (20 Nov 2019 17:33)     PHYSICAL EXAM:   Constitutional: WDWN resting comfortably in bed; NAD  Head: NC/AT  Eyes: PERRL, EOMI, anicteric sclera  ENT: no nasal discharge; uvula midline, no oropharyngeal erythema or exudates; MMM  Neck: supple; no JVD or thyromegaly  Respiratory: CTA B/L; no W/R/R, no retractions  Cardiac: +S1/S2; RRR; no M/R/G; PMI non-displaced  Gastrointestinal: soft, NT/ND; no rebound or guarding; +BSx4  Genitourinary: normal external genitalia  Back: spine midline, no bony tenderness or step-offs; no CVAT B/L  Extremities: WWP, no clubbing or cyanosis; no peripheral edema  Musculoskeletal: NROM x4; no joint swelling, tenderness or erythema  Vascular: 2+ radial, femoral, DP/PT pulses B/L  Dermatologic: skin warm, dry and intact; no rashes, wounds, or scars  Lymphatic: no submandibular or cervical LAD  Physical exam: General: No acute distress, awake and alert Cardiovascular: Regular rate and rhythm, no murmurs, rubs, or gallops. No bruits Pulmonary: Anterior breath sounds clear bilaterally, no crackles or wheezing. No use of accessory muscles Extremities: Radial and DP pulses +2, no edema  Neurologic: -Mental status: Awake, alert, oriented to person, place, and time. Speech is fluent with intact naming, repetition, and comprehension, no dysarthria. Recent and remote memory intact. Follows commands. Attention/concentration intact. Fund of knowledge appropriate. -Cranial nerves:  II: Visual fields are full to confrontation. III, IV, VI: Extraocular movements are intact without nystagmus. Pupils equally round and reactive to light V:  Facial sensation V1-V3 equal and intact  VII: Face is symmetric with normal eye closure and smile VIII: Hearing is bilaterally intact to finger rub IX, X: Uvula is midline and soft palate rises symmetrically XI: Head turning and shoulder shrug are intact. XII: Tongue protrudes midline Motor: Normal bulk and tone. Slight left pronator drift. Strength bilateral upper extremity 5/5, bilateral lower extremities 5/5. Rapid alternating movements intact and symmetric Sensation: Intact to light touch bilaterally. No neglect or extinction on double simultaneous testing. Coordination: No dysmetria on finger-to-nose and heel-to-shin bilaterally  	    MEDICATIONS:  MEDICATIONS  (STANDING):  amLODIPine   Tablet 5 milliGRAM(s) Oral every 24 hours  aspirin enteric coated 81 milliGRAM(s) Oral daily  atorvastatin 80 milliGRAM(s) Oral at bedtime  clopidogrel Tablet 75 milliGRAM(s) Oral every 24 hours  dextrose 5%. 1000 milliLiter(s) (50 mL/Hr) IV Continuous <Continuous>  dextrose 50% Injectable 12.5 Gram(s) IV Push once  enoxaparin Injectable 40 milliGRAM(s) SubCutaneous every 24 hours  insulin lispro (HumaLOG) corrective regimen sliding scale   SubCutaneous Before meals and at bedtime  losartan 100 milliGRAM(s) Oral daily  melatonin 5 milliGRAM(s) Oral at bedtime  venlafaxine XR. 37.5 milliGRAM(s) Oral <User Schedule>    MEDICATIONS  (PRN):  dextrose 40% Gel 15 Gram(s) Oral once PRN Blood Glucose LESS THAN 70 milliGRAM(s)/deciliter  glucagon  Injectable 1 milliGRAM(s) IntraMuscular once PRN Glucose LESS THAN 70 milligrams/deciliter      ALLERGIES:  Allergies    No Known Allergies    LABS:                        12.8   14.36 )-----------( 323      ( 20 Nov 2019 07:35 )             39.8     11-20    136  |  103  |  19  ----------------------------<  151<H>  3.9   |  24  |  1.01    Ca    9.4      20 Nov 2019 07:35  Mg     1.9     11-20    TPro  7.7  /  Alb  3.8  /  TBili  0.2  /  DBili  x   /  AST  19  /  ALT  21  /  AlkPhos  89  11-19    PT/INR - ( 19 Nov 2019 21:22 )   PT: 11.8 sec;   INR: 1.04     PTT - ( 19 Nov 2019 21:22 )  PTT:33.4 sec    Urinalysis Basic - ( 19 Nov 2019 21:41 )    Color: Yellow / Appearance: Clear / SG: >=1.030 / pH: x  Gluc: x / Ketone: NEGATIVE  / Bili: Negative / Urobili: 0.2 E.U./dL   Blood: x / Protein: Trace mg/dL / Nitrite: NEGATIVE   Leuk Esterase: NEGATIVE / RBC: < 5 /HPF / WBC < 5 /HPF   Sq Epi: x / Non Sq Epi: 0-5 /HPF / Bacteria: Present /HPF        RADIOLOGY & ADDITIONAL TESTS: Reviewed.

## 2019-11-20 NOTE — PROGRESS NOTE ADULT - ATTENDING COMMENTS
Patient seen and examined with [facility] team.  Reviewed H&P.  Agree with above.  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  He's admitted for stroke workup.  No specific weakness or numbness.  No visual or speech deficits.    His blood pressure has been elevated during his hospitalization.  No headache.    Plan:  1) Secondary stroke prevention -   a) Continue Aspirin 81mg and Plavix 75mg for antiplatelet.  Note: He wasn't on either prior to hospitalization.  b) Continue Atorvastatin 80mg for statin.  LDL 95    2) Stroke risk factors -   a) HTN - Can start treating blood pressure since day 3 - Agree with restarting home meds  b) DM - Hgb A1C 6.9%  c) Family history - dad had stroke at similar age    3) Further testing -   a) TTE/EULALIO  b) Cardiac monitoring - consider loop recorder placement  c) PT/OT evaluations    4) DVT prophlaxis - Lovenox and SCDs    On request of patient, discussed case with his son.

## 2019-11-20 NOTE — OCCUPATIONAL THERAPY INITIAL EVALUATION ADULT - GENERAL OBSERVATIONS, REHAB EVAL
Pt is right hand dominant. Pt's RN Chun aware of intent to eval/tx; Pt cleared. Pt received in semi bledsoe position - +Heplock. Pt with fair affect, agreeable to OT.

## 2019-11-20 NOTE — CONSULT NOTE ADULT - SUBJECTIVE AND OBJECTIVE BOX
HPI:  Brief Hospital Course:  Pt is a 74M with HTN, newly diagnosed DM (on Metformin), psoriatic arthritis, spinal stenosis (s/p laminectomy 5 years ago at [facility], walks with cane at baseline), presents with intermittent dizziness for the past week on 11/19. Ultimately, went to urgent care and the PCP, who then did MRI brain which revealed L cerebellar infarct. Of note, patient has been having intermittent episodes of NBNB, after being on a fentanyl patch for his spinal stenosis/chronic back pain. After being on fentanyl for a few weeks, he would discontinue therapy 2/2 nausea/vomiting. Last fentanyl patch removed ~2 weeks. ago. In the ED, vitals were 98F, HR 93/min, /93 mmHg, RR 17, 96% on room air. CT angio performed which revealed acute/subacute infarction inferomedial left cerebellum. CT angio performed which revealed no flow in right distal V1 and prox V2 segments and left distal V2 segments which may present high grade stenosis vs. occlusions.     Subjective:      Allergies    No Known Allergies    Intolerances    Home meds:    MEDICATIONS  (STANDING):  aspirin enteric coated 81 milliGRAM(s) Oral daily  atorvastatin 80 milliGRAM(s) Oral at bedtime  clopidogrel Tablet 75 milliGRAM(s) Oral every 24 hours  dextrose 5%. 1000 milliLiter(s) (50 mL/Hr) IV Continuous <Continuous>  dextrose 50% Injectable 12.5 Gram(s) IV Push once  enoxaparin Injectable 40 milliGRAM(s) SubCutaneous every 24 hours  insulin lispro (HumaLOG) corrective regimen sliding scale   SubCutaneous Before meals and at bedtime  losartan 100 milliGRAM(s) Oral daily  melatonin 5 milliGRAM(s) Oral at bedtime  venlafaxine XR. 37.5 milliGRAM(s) Oral <User Schedule>    MEDICATIONS  (PRN):  dextrose 40% Gel 15 Gram(s) Oral once PRN Blood Glucose LESS THAN 70 milliGRAM(s)/deciliter  glucagon  Injectable 1 milliGRAM(s) IntraMuscular once PRN Glucose LESS THAN 70 milligrams/deciliter    Drug Dosing Weight  Height (cm): 180.34 (19 Nov 2019 20:14)  Weight (kg): 100 (19 Nov 2019 20:14)  BMI (kg/m2): 30.7 (19 Nov 2019 20:14)  BSA (m2): 2.2 (19 Nov 2019 20:14)    PAST MEDICAL & SURGICAL HISTORY:  Leukocytosis  Hypertension  Psoriatic arthritis  Psoriasis  Cholelithiases    PSH:  S/P laparoscopic cholecystectomy: feb 2014  S/P colonoscopy with polypectomy: 2013 adenoma  Ye's cyst of knee, right: 1977  S/P appendectomy: 1977    FAMILY HISTORY:  No pertinent family history in first degree relatives cardiac disease    SOCIAL HISTORY:    Vital Signs Last 24 Hrs  T(C): 36.4 (20 Nov 2019 11:02), Max: 36.8 (19 Nov 2019 21:55)  T(F): 97.6 (20 Nov 2019 11:02), Max: 98.3 (20 Nov 2019 01:58)  HR: 65 (20 Nov 2019 09:10) (60 - 93)  BP: 201/86 (20 Nov 2019 09:10) (175/93 - 201/86)  BP(mean): 110 (20 Nov 2019 00:09) (110 - 110)  ABP: --  ABP(mean): --  RR: 18 (20 Nov 2019 06:57) (17 - 18)  SpO2: 95% (20 Nov 2019 09:10) (95% - 97%)    I&O's Detail    19 Nov 2019 07:01  -  20 Nov 2019 07:00  --------------------------------------------------------  IN:  Total IN: 0 mL    OUT:    Voided: 525 mL  Total OUT: 525 mL    Total NET: -525 mL          PHYSICAL EXAM:      Constitutional:    Eyes:    ENMT:    Neck:    Breasts:    Back:    Respiratory:    Cardiovascular:    Gastrointestinal:    Genitourinary:    Rectal:    Extremities:    Vascular:    Neurological:    Skin:    Lymph Nodes:    Musculoskeletal:    Psychiatric:        LABS:  CBC Full  -  ( 20 Nov 2019 07:35 )  WBC Count : 14.36 K/uL  RBC Count : 4.58 M/uL  Hemoglobin : 12.8 g/dL  Hematocrit : 39.8 %  Platelet Count - Automated : 323 K/uL  Mean Cell Volume : 86.9 fl  Mean Cell Hemoglobin : 27.9 pg  Mean Cell Hemoglobin Concentration : 32.2 gm/dL  Auto Neutrophil # : 10.11 K/uL  Auto Lymphocyte # : 2.97 K/uL  Auto Monocyte # : 0.95 K/uL  Auto Eosinophil # : 0.21 K/uL  Auto Basophil # : 0.06 K/uL  Auto Neutrophil % : 70.4 %  Auto Lymphocyte % : 20.7 %  Auto Monocyte % : 6.6 %  Auto Eosinophil % : 1.5 %  Auto Basophil % : 0.4 %    11-20    136  |  103  |  19  ----------------------------<  151<H>  3.9   |  24  |  1.01    Ca    9.4      20 Nov 2019 07:35  Mg     1.9     11-20    TPro  7.7  /  Alb  3.8  /  TBili  0.2  /  DBili  x   /  AST  19  /  ALT  21  /  AlkPhos  89  11-19    CAPILLARY BLOOD GLUCOSE      POCT Blood Glucose.: 141 mg/dL (20 Nov 2019 07:05)    PT/INR - ( 19 Nov 2019 21:22 )   PT: 11.8 sec;   INR: 1.04          PTT - ( 19 Nov 2019 21:22 )  PTT:33.4 sec  Urinalysis Basic - ( 19 Nov 2019 21:41 )    Color: Yellow / Appearance: Clear / SG: >=1.030 / pH: x  Gluc: x / Ketone: NEGATIVE  / Bili: Negative / Urobili: 0.2 E.U./dL   Blood: x / Protein: Trace mg/dL / Nitrite: NEGATIVE   Leuk Esterase: NEGATIVE / RBC: < 5 /HPF / WBC < 5 /HPF   Sq Epi: x / Non Sq Epi: 0-5 /HPF / Bacteria: Present /HPF    EKG:  NSR     ECHO, US:    RADIOLOGY:  CT head w/o:  Findings compatible with acute/subacute infarction inferomedial left cerebellum. No hydrocephalus at this time.    CTA head/neck:  Again noted is an acute/subacute left inferomedial cerebellar infarct. There is calcified plaque of the bilateral cavernous and supraclinoid internal carotid arteries without significant stenosis. The right A1 segment is hypoplastic. The right A2 and the left anterior cerebral artery are patent without significant stenosis. There is irregularity and narrowing of the bilateral middle cerebral arteries consistent with intracranial atherosclerosis. There is severe stenosis of the left M2 inferior division. There are additional sites of multifocal mild stenosis involving the left M1 and M2 segments. There is multifocal mild stenosis of the right M2 segment. There is mild stenosis of the proximal right V4 segment. There is mild stenosis of the mid basilar artery. There is multifocal mild to moderate narrowing of the bilateral posterior cerebral arteries which is most prominent in the bilateral P2 segments. The left posterior communicating artery is not visualized. The right P1 segment is hypoplastic with a prominent right posterior communicating artery.    CTA NECK: There is a disc osteophyte complex at C4-5 with uncovertebral and facet hypertrophy resulting in mild spinal canal stenosis and severe bilateral neural foraminal narrowing. There is calcified and noncalcified plaque of the aortic arch. There is noncalcified plaque of the bilateral common carotid arteries without significant stenosis. There is noncalcified plaque of the bilateral subclavian arteries without significant stenosis. There is a typical   branching pattern of the aortic arch. There is calcified and noncalcified plaque of the left carotid bifurcation without significant stenosis per NASCET criteria. There is calcified plaque of the distal cervical left internal carotid artery   resulting in mild stenosis. There is severe stenosis of the origin of the left external carotid artery. There is calcified and noncalcified plaque of the right carotid bifurcation without significant stenosis per NASCET criteria. The   remainder of the cervical right internal carotid artery is patent to the level of the skull base. Agree with the findings of the bilateral vertebral arteries of nonopacification of the proximal right vertebral artery just distal to the origin at the level of the calcification with reconstitution of contrast filling at the level of C2-3 disc space which may be due to high grade stenosis or occlusion. There is lack of contrast filling of the left V2 segment the level of C2-3 disc space with reconstitution at the V3 segment. HPI:  Brief Hospital Course:  Pt is a 74M with HTN, newly diagnosed DM (on Metformin), psoriatic arthritis, spinal stenosis (s/p laminectomy 5 years ago at [facility], walks with cane at baseline), presents with intermittent dizziness for the past week on 11/19. Ultimately, went to urgent care and the PCP, who then did MRI brain which revealed L cerebellar infarct. Of note, patient has been having intermittent episodes of NBNB, after being on a fentanyl patch for his spinal stenosis/chronic back pain. After being on fentanyl for a few weeks, he would discontinue therapy 2/2 nausea/vomiting. Last fentanyl patch removed ~2 weeks. ago. In the ED, vitals were 98F, HR 93/min, /93 mmHg, RR 17, 96% on room air. CT angio performed which revealed acute/subacute infarction inferomedial left cerebellum. CT angio performed which revealed no flow in right distal V1 and prox V2 segments and left distal V2 segments which may present high grade stenosis vs. occlusions.     Subjective:  Pt reports some dizziness and very mild nausea. 12 pt ROS is otherwise negative.    Allergies    No Known Allergies    Intolerances    Home meds:    MEDICATIONS  (STANDING):  aspirin enteric coated 81 milliGRAM(s) Oral daily  atorvastatin 80 milliGRAM(s) Oral at bedtime  clopidogrel Tablet 75 milliGRAM(s) Oral every 24 hours  dextrose 5%. 1000 milliLiter(s) (50 mL/Hr) IV Continuous <Continuous>  dextrose 50% Injectable 12.5 Gram(s) IV Push once  enoxaparin Injectable 40 milliGRAM(s) SubCutaneous every 24 hours  insulin lispro (HumaLOG) corrective regimen sliding scale   SubCutaneous Before meals and at bedtime  losartan 100 milliGRAM(s) Oral daily  melatonin 5 milliGRAM(s) Oral at bedtime  venlafaxine XR. 37.5 milliGRAM(s) Oral <User Schedule>    MEDICATIONS  (PRN):  dextrose 40% Gel 15 Gram(s) Oral once PRN Blood Glucose LESS THAN 70 milliGRAM(s)/deciliter  glucagon  Injectable 1 milliGRAM(s) IntraMuscular once PRN Glucose LESS THAN 70 milligrams/deciliter    Drug Dosing Weight  Height (cm): 180.34 (19 Nov 2019 20:14)  Weight (kg): 100 (19 Nov 2019 20:14)  BMI (kg/m2): 30.7 (19 Nov 2019 20:14)  BSA (m2): 2.2 (19 Nov 2019 20:14)    PAST MEDICAL & SURGICAL HISTORY:  Leukocytosis  Hypertension  Psoriatic arthritis  Psoriasis  Cholelithiases    PSH:  S/P laparoscopic cholecystectomy: feb 2014  S/P colonoscopy with polypectomy: 2013 adenoma  Ye's cyst of knee, right: 1977  S/P appendectomy: 1977    FAMILY HISTORY:  No pertinent family history in first degree relatives cardiac disease    SOCIAL HISTORY:    Vital Signs Last 24 Hrs  T(C): 36.4 (20 Nov 2019 11:02), Max: 36.8 (19 Nov 2019 21:55)  T(F): 97.6 (20 Nov 2019 11:02), Max: 98.3 (20 Nov 2019 01:58)  HR: 65 (20 Nov 2019 09:10) (60 - 93)  BP: 201/86 (20 Nov 2019 09:10) (175/93 - 201/86)  BP(mean): 110 (20 Nov 2019 00:09) (110 - 110)  ABP: --  ABP(mean): --  RR: 18 (20 Nov 2019 06:57) (17 - 18)  SpO2: 95% (20 Nov 2019 09:10) (95% - 97%)    I&O's Detail    19 Nov 2019 07:01  -  20 Nov 2019 07:00  --------------------------------------------------------  IN:  Total IN: 0 mL    OUT:    Voided: 525 mL  Total OUT: 525 mL    Total NET: -525 mL          PHYSICAL EXAM:      Constitutional:    Eyes:    ENMT:    Neck:    Breasts:    Back:    Respiratory:    Cardiovascular:    Gastrointestinal:    Genitourinary:    Rectal:    Extremities:    Vascular:    Neurological:    Skin:    Lymph Nodes:    Musculoskeletal:    Psychiatric:        LABS:  CBC Full  -  ( 20 Nov 2019 07:35 )  WBC Count : 14.36 K/uL  RBC Count : 4.58 M/uL  Hemoglobin : 12.8 g/dL  Hematocrit : 39.8 %  Platelet Count - Automated : 323 K/uL  Mean Cell Volume : 86.9 fl  Mean Cell Hemoglobin : 27.9 pg  Mean Cell Hemoglobin Concentration : 32.2 gm/dL  Auto Neutrophil # : 10.11 K/uL  Auto Lymphocyte # : 2.97 K/uL  Auto Monocyte # : 0.95 K/uL  Auto Eosinophil # : 0.21 K/uL  Auto Basophil # : 0.06 K/uL  Auto Neutrophil % : 70.4 %  Auto Lymphocyte % : 20.7 %  Auto Monocyte % : 6.6 %  Auto Eosinophil % : 1.5 %  Auto Basophil % : 0.4 %    11-20    136  |  103  |  19  ----------------------------<  151<H>  3.9   |  24  |  1.01    Ca    9.4      20 Nov 2019 07:35  Mg     1.9     11-20    TPro  7.7  /  Alb  3.8  /  TBili  0.2  /  DBili  x   /  AST  19  /  ALT  21  /  AlkPhos  89  11-19    CAPILLARY BLOOD GLUCOSE      POCT Blood Glucose.: 141 mg/dL (20 Nov 2019 07:05)    PT/INR - ( 19 Nov 2019 21:22 )   PT: 11.8 sec;   INR: 1.04          PTT - ( 19 Nov 2019 21:22 )  PTT:33.4 sec  Urinalysis Basic - ( 19 Nov 2019 21:41 )    Color: Yellow / Appearance: Clear / SG: >=1.030 / pH: x  Gluc: x / Ketone: NEGATIVE  / Bili: Negative / Urobili: 0.2 E.U./dL   Blood: x / Protein: Trace mg/dL / Nitrite: NEGATIVE   Leuk Esterase: NEGATIVE / RBC: < 5 /HPF / WBC < 5 /HPF   Sq Epi: x / Non Sq Epi: 0-5 /HPF / Bacteria: Present /HPF    EKG:  NSR     ECHO, US:    RADIOLOGY:  CT head w/o:  Findings compatible with acute/subacute infarction inferomedial left cerebellum. No hydrocephalus at this time.    CTA head/neck:  Again noted is an acute/subacute left inferomedial cerebellar infarct. There is calcified plaque of the bilateral cavernous and supraclinoid internal carotid arteries without significant stenosis. The right A1 segment is hypoplastic. The right A2 and the left anterior cerebral artery are patent without significant stenosis. There is irregularity and narrowing of the bilateral middle cerebral arteries consistent with intracranial atherosclerosis. There is severe stenosis of the left M2 inferior division. There are additional sites of multifocal mild stenosis involving the left M1 and M2 segments. There is multifocal mild stenosis of the right M2 segment. There is mild stenosis of the proximal right V4 segment. There is mild stenosis of the mid basilar artery. There is multifocal mild to moderate narrowing of the bilateral posterior cerebral arteries which is most prominent in the bilateral P2 segments. The left posterior communicating artery is not visualized. The right P1 segment is hypoplastic with a prominent right posterior communicating artery.    CTA NECK: There is a disc osteophyte complex at C4-5 with uncovertebral and facet hypertrophy resulting in mild spinal canal stenosis and severe bilateral neural foraminal narrowing. There is calcified and noncalcified plaque of the aortic arch. There is noncalcified plaque of the bilateral common carotid arteries without significant stenosis. There is noncalcified plaque of the bilateral subclavian arteries without significant stenosis. There is a typical   branching pattern of the aortic arch. There is calcified and noncalcified plaque of the left carotid bifurcation without significant stenosis per NASCET criteria. There is calcified plaque of the distal cervical left internal carotid artery   resulting in mild stenosis. There is severe stenosis of the origin of the left external carotid artery. There is calcified and noncalcified plaque of the right carotid bifurcation without significant stenosis per NASCET criteria. The   remainder of the cervical right internal carotid artery is patent to the level of the skull base. Agree with the findings of the bilateral vertebral arteries of nonopacification of the proximal right vertebral artery just distal to the origin at the level of the calcification with reconstitution of contrast filling at the level of C2-3 disc space which may be due to high grade stenosis or occlusion. There is lack of contrast filling of the left V2 segment the level of C2-3 disc space with reconstitution at the V3 segment. HPI:  Brief Hospital Course:  Pt is a 74M with HTN, newly diagnosed DM (on Metformin), psoriatic arthritis, spinal stenosis (s/p laminectomy 5 years ago at [facility], walks with cane at baseline), presents with intermittent dizziness for the past week on 11/19. Ultimately, went to urgent care and the PCP, who then did MRI brain which revealed L cerebellar infarct. Of note, patient has been having intermittent episodes of NBNB, after being on a fentanyl patch for his spinal stenosis/chronic back pain. After being on fentanyl for a few weeks, he would discontinue therapy 2/2 nausea/vomiting. Last fentanyl patch removed ~2 weeks. ago. In the ED, vitals were 98F, HR 93/min, /93 mmHg, RR 17, 96% on room air. CT angio performed which revealed acute/subacute infarction inferomedial left cerebellum. CT angio performed which revealed no flow in right distal V1 and prox V2 segments and left distal V2 segments which may present high grade stenosis vs. occlusions.     Subjective:  Pt reports some dizziness and very mild nausea. 12 pt ROS is otherwise negative.    Allergies    No Known Allergies    Intolerances    Home meds:    MEDICATIONS  (STANDING):  aspirin enteric coated 81 milliGRAM(s) Oral daily  atorvastatin 80 milliGRAM(s) Oral at bedtime  clopidogrel Tablet 75 milliGRAM(s) Oral every 24 hours  dextrose 5%. 1000 milliLiter(s) (50 mL/Hr) IV Continuous <Continuous>  dextrose 50% Injectable 12.5 Gram(s) IV Push once  enoxaparin Injectable 40 milliGRAM(s) SubCutaneous every 24 hours  insulin lispro (HumaLOG) corrective regimen sliding scale   SubCutaneous Before meals and at bedtime  losartan 100 milliGRAM(s) Oral daily  melatonin 5 milliGRAM(s) Oral at bedtime  venlafaxine XR. 37.5 milliGRAM(s) Oral <User Schedule>    MEDICATIONS  (PRN):  dextrose 40% Gel 15 Gram(s) Oral once PRN Blood Glucose LESS THAN 70 milliGRAM(s)/deciliter  glucagon  Injectable 1 milliGRAM(s) IntraMuscular once PRN Glucose LESS THAN 70 milligrams/deciliter    Drug Dosing Weight  Height (cm): 180.34 (19 Nov 2019 20:14)  Weight (kg): 100 (19 Nov 2019 20:14)  BMI (kg/m2): 30.7 (19 Nov 2019 20:14)  BSA (m2): 2.2 (19 Nov 2019 20:14)    PAST MEDICAL & SURGICAL HISTORY:  Leukocytosis  Hypertension  Psoriatic arthritis  Psoriasis  Cholelithiases    PSH:  S/P laparoscopic cholecystectomy: feb 2014  S/P colonoscopy with polypectomy: 2013 adenoma  Ye's cyst of knee, right: 1977  S/P appendectomy: 1977    FAMILY HISTORY:  No pertinent family history in first degree relatives cardiac disease    SOCIAL HISTORY: no smoking    Vital Signs Last 24 Hrs  T(C): 36.4 (20 Nov 2019 11:02), Max: 36.8 (19 Nov 2019 21:55)  T(F): 97.6 (20 Nov 2019 11:02), Max: 98.3 (20 Nov 2019 01:58)  HR: 65 (20 Nov 2019 09:10) (60 - 93)  BP: 201/86 (20 Nov 2019 09:10) (175/93 - 201/86)  BP(mean): 110 (20 Nov 2019 00:09) (110 - 110)  ABP: --  ABP(mean): --  RR: 18 (20 Nov 2019 06:57) (17 - 18)  SpO2: 95% (20 Nov 2019 09:10) (95% - 97%)    I&O's Detail    19 Nov 2019 07:01  -  20 Nov 2019 07:00  --------------------------------------------------------  IN:  Total IN: 0 mL    OUT:    Voided: 525 mL  Total OUT: 525 mL    Total NET: -525 mL          PHYSICAL EXAM:      Constitutional: NAD  Eyes: PERRLA  ENMT: MMM  Neck: supple  Back: midline  Respiratory: CTA b/l  Cardiovascular: rrr, s1s2, no m/r/g  Gastrointestinal: soft, NTND, + BS  Extremities: warm  Vascular: + 2 pulses radial, no edema  Neurological: AAO x 4, 5/5 strength in all extremities, light sensation intact throughout  Skin: no rash  Lymph Nodes: no LAD  Musculoskeletal: no joint swelling  Psychiatric: normal affect    LABS:  CBC Full  -  ( 20 Nov 2019 07:35 )  WBC Count : 14.36 K/uL  RBC Count : 4.58 M/uL  Hemoglobin : 12.8 g/dL  Hematocrit : 39.8 %  Platelet Count - Automated : 323 K/uL  Mean Cell Volume : 86.9 fl  Mean Cell Hemoglobin : 27.9 pg  Mean Cell Hemoglobin Concentration : 32.2 gm/dL  Auto Neutrophil # : 10.11 K/uL  Auto Lymphocyte # : 2.97 K/uL  Auto Monocyte # : 0.95 K/uL  Auto Eosinophil # : 0.21 K/uL  Auto Basophil # : 0.06 K/uL  Auto Neutrophil % : 70.4 %  Auto Lymphocyte % : 20.7 %  Auto Monocyte % : 6.6 %  Auto Eosinophil % : 1.5 %  Auto Basophil % : 0.4 %    11-20    136  |  103  |  19  ----------------------------<  151<H>  3.9   |  24  |  1.01    Ca    9.4      20 Nov 2019 07:35  Mg     1.9     11-20    TPro  7.7  /  Alb  3.8  /  TBili  0.2  /  DBili  x   /  AST  19  /  ALT  21  /  AlkPhos  89  11-19    CAPILLARY BLOOD GLUCOSE      POCT Blood Glucose.: 141 mg/dL (20 Nov 2019 07:05)    PT/INR - ( 19 Nov 2019 21:22 )   PT: 11.8 sec;   INR: 1.04          PTT - ( 19 Nov 2019 21:22 )  PTT:33.4 sec  Urinalysis Basic - ( 19 Nov 2019 21:41 )    Color: Yellow / Appearance: Clear / SG: >=1.030 / pH: x  Gluc: x / Ketone: NEGATIVE  / Bili: Negative / Urobili: 0.2 E.U./dL   Blood: x / Protein: Trace mg/dL / Nitrite: NEGATIVE   Leuk Esterase: NEGATIVE / RBC: < 5 /HPF / WBC < 5 /HPF   Sq Epi: x / Non Sq Epi: 0-5 /HPF / Bacteria: Present /HPF    EKG:  NSR     ECHO, US:    RADIOLOGY:  CT head w/o:  Findings compatible with acute/subacute infarction inferomedial left cerebellum. No hydrocephalus at this time.    CTA head/neck:  Again noted is an acute/subacute left inferomedial cerebellar infarct. There is calcified plaque of the bilateral cavernous and supraclinoid internal carotid arteries without significant stenosis. The right A1 segment is hypoplastic. The right A2 and the left anterior cerebral artery are patent without significant stenosis. There is irregularity and narrowing of the bilateral middle cerebral arteries consistent with intracranial atherosclerosis. There is severe stenosis of the left M2 inferior division. There are additional sites of multifocal mild stenosis involving the left M1 and M2 segments. There is multifocal mild stenosis of the right M2 segment. There is mild stenosis of the proximal right V4 segment. There is mild stenosis of the mid basilar artery. There is multifocal mild to moderate narrowing of the bilateral posterior cerebral arteries which is most prominent in the bilateral P2 segments. The left posterior communicating artery is not visualized. The right P1 segment is hypoplastic with a prominent right posterior communicating artery.    CTA NECK: There is a disc osteophyte complex at C4-5 with uncovertebral and facet hypertrophy resulting in mild spinal canal stenosis and severe bilateral neural foraminal narrowing. There is calcified and noncalcified plaque of the aortic arch. There is noncalcified plaque of the bilateral common carotid arteries without significant stenosis. There is noncalcified plaque of the bilateral subclavian arteries without significant stenosis. There is a typical   branching pattern of the aortic arch. There is calcified and noncalcified plaque of the left carotid bifurcation without significant stenosis per NASCET criteria. There is calcified plaque of the distal cervical left internal carotid artery   resulting in mild stenosis. There is severe stenosis of the origin of the left external carotid artery. There is calcified and noncalcified plaque of the right carotid bifurcation without significant stenosis per NASCET criteria. The   remainder of the cervical right internal carotid artery is patent to the level of the skull base. Agree with the findings of the bilateral vertebral arteries of nonopacification of the proximal right vertebral artery just distal to the origin at the level of the calcification with reconstitution of contrast filling at the level of C2-3 disc space which may be due to high grade stenosis or occlusion. There is lack of contrast filling of the left V2 segment the level of C2-3 disc space with reconstitution at the V3 segment.

## 2019-11-20 NOTE — PHYSICAL THERAPY INITIAL EVALUATION ADULT - GENERAL OBSERVATIONS, REHAB EVAL
As per Chun RN patient cleared for PT/OOB. Received supine + heplock, telemetry, with c/o back pain 2/10, agreebale to PT

## 2019-11-20 NOTE — OCCUPATIONAL THERAPY INITIAL EVALUATION ADULT - ADDITIONAL COMMENTS
Pt lives with his wife in apt with elevator. Pt reports independence with his ADLs and functional transfers. States that he uses a cane for mobility at baseline due to unsteadiness from spinal surgery some years ago. Pt is a realtor by profession.

## 2019-11-20 NOTE — PROGRESS NOTE ADULT - PROBLEM SELECTOR PLAN 3
Pt with h/o Psoriatic arthritis, takes Cosentyx monthly and meloxicam daily. Currently joints without swelling, edema, no evidence of flare   - Tylenol PRN for pain

## 2019-11-20 NOTE — OCCUPATIONAL THERAPY INITIAL EVALUATION ADULT - MODIFIED CLINICAL TEST OF SENSORY INTEGRATION IN BALANCE TEST
Standing balance/mobility to be assessed - Pt with SBP at 211 upon sitting - MD advised to hold OOB mobility activities

## 2019-11-20 NOTE — OCCUPATIONAL THERAPY INITIAL EVALUATION ADULT - PERTINENT HX OF CURRENT PROBLEM, REHAB EVAL
C/o of intermittent dizziness, room spinning sensation x1 week, and unsteady gait (worse than baseline, walks with cane at baseline). +Cerebellar Infarct -  MRI outpatient with large left cerebellar infarct. Neuro exam fairly unremarkable without any signficant focal neuro deficits, mild dysmetria on left side. CT angio with no flow visualized in the right distal V1 and proximal V2 segments and the left distal V2 segments, which may represent high-grade stenoses versus occlusions.

## 2019-11-20 NOTE — PROGRESS NOTE ADULT - PROBLEM SELECTOR PLAN 5
Pt with newly diagnosed DM, last A1c ~6.9 previously 10.1. On Metformin 750mg at home  - hold metformin  - MISS  - A1c 6.9   - carb consistent diet

## 2019-11-20 NOTE — CONSULT NOTE ADULT - SUBJECTIVE AND OBJECTIVE BOX
Patient is a 74y old  Male who presents with a chief complaint of Stroke (19 Nov 2019 21:30)       HPI:  74M with HTN, newly diagnosed DM (on Metformin), psoriatic arthritis, spinal stenosis (s/p laminectomy 5 years ago at [facility], walks with cane at baseline), presents with intermittent dizziness for the past week. However, he noticed on Saturday,~3 days ago, while he was sitting at the desk, he developed sudden onset left leg numbness and perioral numbness. Initially he thought it was related to low blood sugar, he had some food/juice which helped with the symptoms. However upon getting up, he developed dizziness. He took the day off on Sunday and rested all day, and did not have any  symptoms that day. However on Monday, he did his usual activities, and was working from home. Around 3 pm on Monday upon getting up from laying down for a while, he developed room spinning sensation/dizziness and sustained a fall and hit the back of his head, without any loss of consciousness. Ultimately, went to urgent care and the PCP, who then did MRI brain which revealed L cerebellar infarct. Of note, patient has been having intermittent episodes of NBNB, after being on a fentanyl patch for his spinal stenosis/chronic back pain. After being on fentanyl for a few weeks, he would discontinue therapy 2/2 nausea/vomiting. Last fentanyl patch removed ~2 weeks. ago. Otherwise denies fevers, chills, chest pain, shortness of breath, recent illnesses/ hospitalizations, n/v/d, abd pain, dysuria, hematuria, or any other complaints at this time.     In the ED, vitals were 98F, HR 93/min, /93 mmHg, RR 17, 96% on room air. CT angio performed which revealed acute/subacute infarction inferomedial left cerebellum. CT angio performed which revealed no flow in right distal V1 and prox V2 segments and left distal V2 segments which may present high grade stenosis vs. occlusions. Labs s/f leukoctyosis WBC 16k, BMP with elevated glucose to 160s. In the ED, received 325 mg ASA, and 75mg Plavix. Transferred to  for further monitoring (19 Nov 2019 21:30)      PAST MEDICAL & SURGICAL HISTORY:  Leukocytosis  Hypertension  Psoriatic arthritis  Psoriasis  Cholelithiases  S/P laparoscopic cholecystectomy: feb 2014  S/P colonoscopy with polypectomy: 2013 adenoma  Ye's cyst of knee, right: 1977  S/P appendectomy: 1977      MEDICATIONS  (STANDING):  aspirin enteric coated 81 milliGRAM(s) Oral daily  atorvastatin 80 milliGRAM(s) Oral at bedtime  clopidogrel Tablet 75 milliGRAM(s) Oral every 24 hours  dextrose 5%. 1000 milliLiter(s) (50 mL/Hr) IV Continuous <Continuous>  dextrose 50% Injectable 12.5 Gram(s) IV Push once  enoxaparin Injectable 40 milliGRAM(s) SubCutaneous every 24 hours  insulin lispro (HumaLOG) corrective regimen sliding scale   SubCutaneous Before meals and at bedtime  losartan 100 milliGRAM(s) Oral daily  melatonin 5 milliGRAM(s) Oral at bedtime  venlafaxine XR. 37.5 milliGRAM(s) Oral <User Schedule>    MEDICATIONS  (PRN):  dextrose 40% Gel 15 Gram(s) Oral once PRN Blood Glucose LESS THAN 70 milliGRAM(s)/deciliter  glucagon  Injectable 1 milliGRAM(s) IntraMuscular once PRN Glucose LESS THAN 70 milligrams/deciliter      FAMILY HISTORY:  No pertinent family history in first degree relatives      CBC Full  -  ( 20 Nov 2019 07:35 )  WBC Count : 14.36 K/uL  RBC Count : 4.58 M/uL  Hemoglobin : 12.8 g/dL  Hematocrit : 39.8 %  Platelet Count - Automated : 323 K/uL  Mean Cell Volume : 86.9 fl  Mean Cell Hemoglobin : 27.9 pg  Mean Cell Hemoglobin Concentration : 32.2 gm/dL  Auto Neutrophil # : 10.11 K/uL  Auto Lymphocyte # : 2.97 K/uL  Auto Monocyte # : 0.95 K/uL  Auto Eosinophil # : 0.21 K/uL  Auto Basophil # : 0.06 K/uL  Auto Neutrophil % : 70.4 %  Auto Lymphocyte % : 20.7 %  Auto Monocyte % : 6.6 %  Auto Eosinophil % : 1.5 %  Auto Basophil % : 0.4 %      11-20    136  |  103  |  19  ----------------------------<  151<H>  3.9   |  24  |  1.01    Ca    9.4      20 Nov 2019 07:35  Mg     1.9     11-20    TPro  7.7  /  Alb  3.8  /  TBili  0.2  /  DBili  x   /  AST  19  /  ALT  21  /  AlkPhos  89  11-19      Urinalysis Basic - ( 19 Nov 2019 21:41 )    Color: Yellow / Appearance: Clear / SG: >=1.030 / pH: x  Gluc: x / Ketone: NEGATIVE  / Bili: Negative / Urobili: 0.2 E.U./dL   Blood: x / Protein: Trace mg/dL / Nitrite: NEGATIVE   Leuk Esterase: NEGATIVE / RBC: < 5 /HPF / WBC < 5 /HPF   Sq Epi: x / Non Sq Epi: 0-5 /HPF / Bacteria: Present /HPF          Radiology:    < from: CT Brain Stroke Protocol (11.19.19 @ 22:54) >  EXAM:  CT BRAIN STROKE PROTOCOL                          PROCEDURE DATE:  11/19/2019          INTERPRETATION:  INDICATIONS: Stroke Code.    TECHNIQUE: Serial axial images were obtained from the skull base to the   vertex without the use of intravenous contrast. Imaging is performed   using helical low-dose technique, and sagittal and coronal reformations   are provided.    COMPARISON EXAMINATION: None.    FINDINGS:    VENTRICLES AND SULCI: Normal.  INTRA-AXIAL: There is a wedge-shaped region ofhypoattenuation along the   inferomedial left cerebellum. There is mild patchy hypodensity within the   periventricular and subcortical white matter which is nonspecific and may   represent the sequela of small vessel ischemic disease.  No acute   intracranial hemorrhage. No mass effect or midline shift.   EXTRA-AXIAL: No extra-axial fluid collection is present.   VISUALIZED SINUSES: No air-fluid levels are identified.   VISUALIZED MASTOIDS: Well developed and aerated bilaterally.  CALVARIUM: No calvarial fracture.    IMPRESSION:   Findings compatible with acute/subacute infarction inferomedial left   cerebellum. No hydrocephalus at this time.        < from: CT Angio Head w/ IV Cont (11.19.19 @ 22:53) >  ******PRELIMINARY REPORT******    ******PRELIMINARY REPORT******            EXAM:  CT ANGIO BRAIN (W)AW IC                          EXAM:  CT ANGIO NECK (W)AW IC                          PROCEDURE DATE:  11/19/2019    ******PRELIMINARY REPORT******   ******PRELIMINARY REPORT******              INTERPRETATION:  PROCEDURE: CTA brain with intravenous contrast.    INDICATION: Dizziness.    TECHNIQUE: Multiple thin section axial images were obtained through the   [place] following the intravenous injection of contrast. MPR   sagittal and coronal MIP images were generated from the axial images.   Approximately 100 cc of Optiray 350 were administered.    COMPARISON: None.    FINDINGS: The CTA examination of the [place] demonstrates the   internal carotid arteries to be normal in caliber. The left internal   carotid artery bifurcates into two anterior communicating arteries. There   is a normal bifurcation into the A1 and M1 segments. There is a normal   MCA bifurcation. There is a right fetal PCOM. The basilar artery is   normal in caliber. There is a normal bifurcation into the posterior   cerebral arteries. There are no areas of stenosis, dilatation or aneurysm.    IMPRESSION: No large vessel occlusion. See discussion of vertebral   arteries below.      PROCEDURE: CTA neck with intravenous contrast.    INDICATION: Dizziness.    TECHNIQUE: Multiple axial thin section were obtained through the neck   following the intravenous injection of contrast. MPR sagittal and coronal   MIP images were generated from the axial images. Approximately 100 cc of   Optiray 350 were administered.    COMPARISON: None.    FINDINGS: The CTA examination demonstrates the right common carotid   artery to be normal in caliber. There is anormal bifurcation into the   right internal and external carotid arteries. There is calcification at   the carotid bifurcation without hemodynamically significant stenosis.     The left common carotid artery is normal in caliber. There is a normal   bifurcation into the left internal and external carotid arteries. There   is calcification at the carotid bifurcation without hemodynamically   significant stenosis.     There is calcification right vertebral artery just past its origin. There   is then no visualized flow distal to this point in the right V1 and V2   until it reaches the level of the C3 vertebral body where it seen to   reconstitute flow. There are multiple tandem stenoses of the distal V3/V4   segments as well. The left V1 and proximal V2 segments are patent.   However, there is no flow visualized in the left V2 segment at the level   of the C2/3 vertebral bodies with complete reconstitution at the level of   the left V3/4 vertebral arteries.    The aortic arch appears intact without narrowing of the origin of the   great vessels.    IMPRESSION: There is no flow visualized in the right distal V1 and   proximal V2 segments and the left distal V2 segments, which may represent   high-grade stenoses versus occlusions. There are additional areas of   tandem stenoses bilaterally.          Vital Signs Last 24 Hrs  T(C): 36.3 (20 Nov 2019 05:40), Max: 36.8 (19 Nov 2019 21:55)  T(F): 97.3 (20 Nov 2019 05:40), Max: 98.3 (20 Nov 2019 01:58)  HR: 64 (20 Nov 2019 06:57) (60 - 93)  BP: 184/81 (20 Nov 2019 06:57) (175/93 - 196/84)  BP(mean): 110 (20 Nov 2019 00:09) (110 - 110)  RR: 18 (20 Nov 2019 06:57) (17 - 18)  SpO2: 95% (20 Nov 2019 06:57) (95% - 97%)    REVIEW OF SYSTEMS:    CONSTITUTIONAL: No fever, weight loss, or fatigue  EYES: No eye pain, visual disturbances, or discharge  ENMT:  No difficulty hearing, tinnitus, vertigo; No sinus or throat pain  NECK: No pain or stiffness  BREASTS: No pain, masses, or nipple discharge  RESPIRATORY: No cough, wheezing, chills or hemoptysis; No shortness of breath  CARDIOVASCULAR: No chest pain, palpitations, dizziness, or leg swelling  GASTROINTESTINAL: No abdominal or epigastric pain. No nausea, vomiting, or hematemesis; No diarrhea or constipation. No melena or hematochezia.  GENITOURINARY: No dysuria, frequency, hematuria, or incontinence  NEUROLOGICAL:  dizziness  SKIN: No itching, burning, rashes, or lesions   LYMPH NODES: No enlarged glands  ENDOCRINE: No heat or cold intolerance; No hair loss  MUSCULOSKELETAL: No joint pain or swelling; No muscle, back, or extremity pain  PSYCHIATRIC: No depression, anxiety, mood swings, or difficulty sleeping  HEME/LYMPH: No easy bruising, or bleeding gums  ALLERGY AND IMMUNOLOGIC: No hives or eczema  VASCULAR: no swelling, erythema   :      Physical Exam: 75 yo gentleman lying in semi Rios's position, "feeling better"    Head: normocephalic, atraumatic    Eyes: PERRLA, EOMI, no nystagmus, sclera anicteric    ENT: nasal discharge, uvula midline, no oropharyngeal erythema/exudate    Neck: supple, negative JVD, negative carotid bruits, no thyromegaly    Chest: CTA bilaterally, neg wheeze/ rhonchi/ rales/ crackles/ egophany    Cardiovascular: regular rate and rhythm, neg murmurs/rubs/gallops    Abdomen: soft, non distended, non tender, negative rebound/guarding, normal bowel sounds, neg hepatosplenomegaly    Extremities: WWP, neg cyanosis/clubbing/edema, negative calf tenderness to palpation, negative Sanford's sign    :     Neurologic Exam:    Alert and oriented to person, place, date/year, speech fluent w/o dysarthria, recent and remote memory intact, repetition intact, comprehension intact    Cranial Nerves:     II:                       pupils equal, round and reactive to light, visual fields intact   III/ IV/VI:            extraocular movements intact, neg nystagmus, neg ptosis  V:                       facial sensation intact, V1-3 normal  VII:                     face symmetric, no droop, normal eye closure and smile  VIII:                    hearing intact to finger rub bilaterally  IX/ X:                 soft palate rise symmetrical  XI:                      head turning, shoulder shrug normal  XII:                     tongue midline    Motor Exam:    Upper Extremities:     RIght:   5/5   /intrinsics               5/5  wrist flexors/extensors               5/5  biceps/triceps               5/5  deltoid               negative drift    Left :    5/5  /intrinsics               5/5  wrist flexors/extensors               5/5 biceps/triceps               5/5 deltoid               negative drift    Lower Extremities:                 Right:     5/5  DF/PF/ evertors/ invertors                5/5  Quad/ hamstrings                5/5  hip flexors/adductors/abductors                 Left:       5/5  DF/PF/ evertors/ invertors                5/5  Quad/ hamstrings                5/5  hip flexors/adductors/abductors                       Sensory:    intact to LT/PP in all UE/LE dermatomes    DTR:            = biceps/     triceps/     brachioradialis                      = patella/   medial hamstring/ankle                      neg clonus                      neg Babinski                        Finger to Nose:  left hypometria    Heel to Shin:  wnl    Rapid Alternating movements:  wnl    Joint Position Sense:  intact    Romberg:  not tested    Tandem Walking:  not tested    Gait:  not tested        PM&R Impression:    1) s/p acute/subacute left cerebellar infarct      Recommendations:    1) Physical therapy focusing on therapeutic exercises, bed mobility/transfer out of bed evaluation, progressive ambulation with assistive devices prn.    2) Anticipated Disposition Plan/Recs:  pending functional progress

## 2019-11-20 NOTE — PROGRESS NOTE ADULT - PROBLEM SELECTOR PLAN 4
Patient with history of HTN, on Losartan 100mg qd at home. Blood pressures elevated on arrival to 200s, improved to 170s while sleeping.   - BP goals 160-180s   - restarted Losartan 100mg in AM  - added 5mg amlodipine

## 2019-11-20 NOTE — PHYSICAL THERAPY INITIAL EVALUATION ADULT - COORDINATION ASSESSED, REHAB EVAL
finger to nose/heel to shin/Grossly WNL bilaterally Grossly WNL bilaterally/finger to nose/heel to shin

## 2019-11-20 NOTE — CONSULT NOTE ADULT - PROBLEM SELECTOR RECOMMENDATION 9
Imaging as reported above and imaging that was done here as above  -ASA, Plavix, Statin  -Pending Echo

## 2019-11-20 NOTE — CONSULT NOTE ADULT - ASSESSMENT
74M with PMH psoriatic arthritis, HTN, DM, spinal stenosis, presents with dizziness x1 week, admitted for acute/subacute L cerebellar infarct.

## 2019-11-20 NOTE — OCCUPATIONAL THERAPY INITIAL EVALUATION ADULT - PLANNED THERAPY INTERVENTIONS, OT EVAL
balance training/motor coordination training/transfer training/cognitive, visual perceptual/neuromuscular re-education/ADL retraining/fine motor coordination training/strengthening

## 2019-11-20 NOTE — PHYSICAL THERAPY INITIAL EVALUATION ADULT - PERTINENT HX OF CURRENT PROBLEM, REHAB EVAL
74M with HTN, newly diagnosed DM (on Metformin), psoriatic arthritis, spinal stenosis (s/p laminectomy 5 years ago at [facility], walks with cane at baseline), presents with intermittent dizziness for the past week. However, he noticed on Saturday,~3 days ago, while he was sitting at the desk, he developed sudden onset left leg numbness and perioral numbness.  CT head showes cerebellar infarct

## 2019-11-21 LAB
ANION GAP SERPL CALC-SCNC: 10 MMOL/L — SIGNIFICANT CHANGE UP (ref 5–17)
BUN SERPL-MCNC: 17 MG/DL — SIGNIFICANT CHANGE UP (ref 7–23)
CALCIUM SERPL-MCNC: 9.4 MG/DL — SIGNIFICANT CHANGE UP (ref 8.4–10.5)
CHLORIDE SERPL-SCNC: 103 MMOL/L — SIGNIFICANT CHANGE UP (ref 96–108)
CO2 SERPL-SCNC: 23 MMOL/L — SIGNIFICANT CHANGE UP (ref 22–31)
CREAT SERPL-MCNC: 1.01 MG/DL — SIGNIFICANT CHANGE UP (ref 0.5–1.3)
GLUCOSE BLDC GLUCOMTR-MCNC: 125 MG/DL — HIGH (ref 70–99)
GLUCOSE BLDC GLUCOMTR-MCNC: 125 MG/DL — HIGH (ref 70–99)
GLUCOSE BLDC GLUCOMTR-MCNC: 134 MG/DL — HIGH (ref 70–99)
GLUCOSE BLDC GLUCOMTR-MCNC: 156 MG/DL — HIGH (ref 70–99)
GLUCOSE BLDC GLUCOMTR-MCNC: 159 MG/DL — HIGH (ref 70–99)
GLUCOSE SERPL-MCNC: 143 MG/DL — HIGH (ref 70–99)
HCT VFR BLD CALC: 41.6 % — SIGNIFICANT CHANGE UP (ref 39–50)
HGB BLD-MCNC: 13.2 G/DL — SIGNIFICANT CHANGE UP (ref 13–17)
MAGNESIUM SERPL-MCNC: 1.9 MG/DL — SIGNIFICANT CHANGE UP (ref 1.6–2.6)
MCHC RBC-ENTMCNC: 27.7 PG — SIGNIFICANT CHANGE UP (ref 27–34)
MCHC RBC-ENTMCNC: 31.7 GM/DL — LOW (ref 32–36)
MCV RBC AUTO: 87.4 FL — SIGNIFICANT CHANGE UP (ref 80–100)
NRBC # BLD: 0 /100 WBCS — SIGNIFICANT CHANGE UP (ref 0–0)
PLATELET # BLD AUTO: 329 K/UL — SIGNIFICANT CHANGE UP (ref 150–400)
POTASSIUM SERPL-MCNC: 4 MMOL/L — SIGNIFICANT CHANGE UP (ref 3.5–5.3)
POTASSIUM SERPL-SCNC: 4 MMOL/L — SIGNIFICANT CHANGE UP (ref 3.5–5.3)
RBC # BLD: 4.76 M/UL — SIGNIFICANT CHANGE UP (ref 4.2–5.8)
RBC # FLD: 14 % — SIGNIFICANT CHANGE UP (ref 10.3–14.5)
SODIUM SERPL-SCNC: 136 MMOL/L — SIGNIFICANT CHANGE UP (ref 135–145)
WBC # BLD: 13.45 K/UL — HIGH (ref 3.8–10.5)
WBC # FLD AUTO: 13.45 K/UL — HIGH (ref 3.8–10.5)

## 2019-11-21 PROCEDURE — 93312 ECHO TRANSESOPHAGEAL: CPT | Mod: 26

## 2019-11-21 PROCEDURE — 99233 SBSQ HOSP IP/OBS HIGH 50: CPT | Mod: GC

## 2019-11-21 RX ORDER — LIDOCAINE 4 G/100G
1 CREAM TOPICAL DAILY
Refills: 0 | Status: DISCONTINUED | OUTPATIENT
Start: 2019-11-21 | End: 2019-11-22

## 2019-11-21 RX ORDER — LIDOCAINE 4 G/100G
1 CREAM TOPICAL DAILY
Refills: 0 | Status: DISCONTINUED | OUTPATIENT
Start: 2019-11-21 | End: 2019-11-21

## 2019-11-21 RX ADMIN — Medication 5 MILLIGRAM(S): at 22:10

## 2019-11-21 RX ADMIN — ENOXAPARIN SODIUM 40 MILLIGRAM(S): 100 INJECTION SUBCUTANEOUS at 06:57

## 2019-11-21 RX ADMIN — LIDOCAINE 1 PATCH: 4 CREAM TOPICAL at 12:17

## 2019-11-21 RX ADMIN — Medication 2: at 07:07

## 2019-11-21 RX ADMIN — Medication 81 MILLIGRAM(S): at 12:43

## 2019-11-21 RX ADMIN — ATORVASTATIN CALCIUM 80 MILLIGRAM(S): 80 TABLET, FILM COATED ORAL at 22:10

## 2019-11-21 RX ADMIN — LIDOCAINE 1 PATCH: 4 CREAM TOPICAL at 19:08

## 2019-11-21 RX ADMIN — CLOPIDOGREL BISULFATE 75 MILLIGRAM(S): 75 TABLET, FILM COATED ORAL at 23:47

## 2019-11-21 RX ADMIN — AMLODIPINE BESYLATE 5 MILLIGRAM(S): 2.5 TABLET ORAL at 16:59

## 2019-11-21 RX ADMIN — LIDOCAINE 1 PATCH: 4 CREAM TOPICAL at 23:47

## 2019-11-21 RX ADMIN — LOSARTAN POTASSIUM 100 MILLIGRAM(S): 100 TABLET, FILM COATED ORAL at 06:57

## 2019-11-21 RX ADMIN — Medication 2: at 12:43

## 2019-11-21 NOTE — PROGRESS NOTE ADULT - SUBJECTIVE AND OBJECTIVE BOX
INTERVAL HPI/OVERNIGHT EVENTS:    SUBJECTIVE: Patient seen and examined at bedside.    VITAL SIGNS:  ICU Vital Signs Last 24 Hrs  T(C): 36.7 (21 Nov 2019 16:46), Max: 36.8 (20 Nov 2019 17:53)  T(F): 98.1 (21 Nov 2019 16:46), Max: 98.3 (20 Nov 2019 17:53)  HR: 70 (21 Nov 2019 11:48) (64 - 75)  BP: 166/76 (21 Nov 2019 11:48) (156/67 - 198/87)  BP(mean): 109 (21 Nov 2019 11:48) (97 - 109)  ABP: --  ABP(mean): --  RR: 19 (21 Nov 2019 11:48) (18 - 20)  SpO2: 98% (21 Nov 2019 11:48) (95% - 98%)        11-21 @ 07:01  -  11-21 @ 17:33  --------------------------------------------------------  IN: 0 mL / OUT: 300 mL / NET: -300 mL      CAPILLARY BLOOD GLUCOSE      POCT Blood Glucose.: 125 mg/dL (21 Nov 2019 16:29)      PHYSICAL EXAM:      Constitutional: NAD, WDWN resting comfortably in bed; NAD  HEENT: NC/AT,  PERRL, anicteric sclera; MMM, no nasal discharge; uvula midline, no oropharyngeal erythema or exudates; MMM  Neck: supple; no JVD or thyromegaly  Respiratory: CTA B/L; no W/R/R, no retractions  Cardiac: +S1/S2; RRR; no M/R/G; PMI non-displaced  Gastrointestinal: soft, NT/ND; no rebound or guarding; +BSx4  Back: spine midline, no bony tenderness or step-offs; no CVAT B/L  Extremities: WWP, no clubbing or cyanosis; no peripheral edema  Musculoskeletal: NROM x4; no joint swelling, tenderness or erythema  Vascular: 2+ radial, DP/PT pulses B/L  Dermatologic: skin warm, dry and intact; no rashes, wounds, or scars  Lymphatic: no submandibular or cervical LAD  Neurologic:  -Mental status: Awake, alert, oriented to person, place, and time. Speech is fluent with intact naming, repetition, and comprehension, no dysarthria. Recent and remote memory intact. Follows commands. Attention/concentration intact. Fund of knowledge appropriate.  -Cranial nerves:   II: Visual fields are full to confrontation.  III, IV, VI: Extraocular movements are intact without nystagmus. Pupils equally round and reactive to light  V:  Facial sensation V1-V3 equal and intact   VII: Face is symmetric with normal eye closure and smile  VIII: Hearing is bilaterally intact to finger rub  IX, X: Uvula is midline and soft palate rises symmetrically  XI: Head turning and shoulder shrug are intact.  XII: Tongue protrudes midline  Motor: Normal bulk and tone. Slight left pronator drift. Strength bilateral upper extremity 5/5, bilateral lower extremities 5/5.  Rapid alternating movements intact and symmetric  Sensation: Intact to light touch bilaterally. No neglect or extinction on double simultaneous testing.  Coordination: No dysmetria on finger-to-nose and heel-to-shin bilaterally      MEDICATIONS:  MEDICATIONS  (STANDING):  amLODIPine   Tablet 5 milliGRAM(s) Oral every 24 hours  aspirin enteric coated 81 milliGRAM(s) Oral daily  atorvastatin 80 milliGRAM(s) Oral at bedtime  clopidogrel Tablet 75 milliGRAM(s) Oral every 24 hours  dextrose 5%. 1000 milliLiter(s) (50 mL/Hr) IV Continuous <Continuous>  dextrose 50% Injectable 12.5 Gram(s) IV Push once  enoxaparin Injectable 40 milliGRAM(s) SubCutaneous every 24 hours  insulin lispro (HumaLOG) corrective regimen sliding scale   SubCutaneous Before meals and at bedtime  lidocaine   Patch 1 Patch Transdermal daily  losartan 100 milliGRAM(s) Oral daily  melatonin 5 milliGRAM(s) Oral at bedtime  venlafaxine XR. 37.5 milliGRAM(s) Oral <User Schedule>    MEDICATIONS  (PRN):  dextrose 40% Gel 15 Gram(s) Oral once PRN Blood Glucose LESS THAN 70 milliGRAM(s)/deciliter  glucagon  Injectable 1 milliGRAM(s) IntraMuscular once PRN Glucose LESS THAN 70 milligrams/deciliter      ALLERGIES:  Allergies    No Known Allergies    Intolerances        LABS:                        13.2   13.45 )-----------( 329      ( 21 Nov 2019 06:14 )             41.6     11-21    136  |  103  |  17  ----------------------------<  143<H>  4.0   |  23  |  1.01    Ca    9.4      21 Nov 2019 06:14  Mg     1.9     11-21    TPro  7.7  /  Alb  3.8  /  TBili  0.2  /  DBili  x   /  AST  19  /  ALT  21  /  AlkPhos  89  11-19    PT/INR - ( 19 Nov 2019 21:22 )   PT: 11.8 sec;   INR: 1.04          PTT - ( 19 Nov 2019 21:22 )  PTT:33.4 sec  Urinalysis Basic - ( 19 Nov 2019 21:41 )    Color: Yellow / Appearance: Clear / SG: >=1.030 / pH: x  Gluc: x / Ketone: NEGATIVE  / Bili: Negative / Urobili: 0.2 E.U./dL   Blood: x / Protein: Trace mg/dL / Nitrite: NEGATIVE   Leuk Esterase: NEGATIVE / RBC: < 5 /HPF / WBC < 5 /HPF   Sq Epi: x / Non Sq Epi: 0-5 /HPF / Bacteria: Present /HPF        RADIOLOGY & ADDITIONAL TESTS: Reviewed.

## 2019-11-21 NOTE — DIETITIAN INITIAL EVALUATION ADULT. - OTHER INFO
74M with HTN, DM2 (A1C 6.9) (on Metformin), psoriatic arthritis, spinal stenosis (s/p laminectomy 5 years ago at [facility], walks with cane at baseline), presents with intermittent dizziness for the past week. MRI showing acute/subacute L cerebellar infarct, admitted for managment. S/p EULALIO today pending results. Observed pt resting in bed with wife at bedside, noting ~50% PO intake. No noted n/v/d/c, chewing/ swallowing issues or pain at this time, skin is intact. NKFA, wt remains stable per pt. Noted initial A1C 10 and was able to bring down to 6.4 however now increased to 6.9 d/t poor diet/ lifestyle choices per pt. Reports knowledge on appropriate diet choices (CstCho and DASH/TLC) however often does not follow them, able to illicit low sodium diet and cst cho choices. Will continue to follow per protocol and reinforce ed prn.

## 2019-11-21 NOTE — DIETITIAN INITIAL EVALUATION ADULT. - ADD RECOMMEND
1. Add CstCho diet 2. Manage pain prn 3. Encourage intake through day 4. Monitor and replete lytes prn 5. Reinforce ed prn

## 2019-11-21 NOTE — DIETITIAN INITIAL EVALUATION ADULT. - ENERGY NEEDS
Ideal body weight used for calculations as pt >120% of IBW.   ABW 100kg, IBW 78kg, 125% IBW, ht 71", BMI 30.8   Nutrient needs based on [facility] standards of care for maintenance in adults, adjusted for age

## 2019-11-21 NOTE — PROGRESS NOTE ADULT - SUBJECTIVE AND OBJECTIVE BOX
Physical Medicine and Rehabilitation Progress Note:    Patient is a 74y old  Male who presents with a chief complaint of Stroke (21 Nov 2019 09:04)      HPI:  74M with HTN, newly diagnosed DM (on Metformin), psoriatic arthritis, spinal stenosis (s/p laminectomy 5 years ago at [facility], walks with cane at baseline), presents with intermittent dizziness for the past week. However, he noticed on Saturday,~3 days ago, while he was sitting at the desk, he developed sudden onset left leg numbness and perioral numbness. Initially he thought it was related to low blood sugar, he had some food/juice which helped with the symptoms. However upon getting up, he developed dizziness. He took the day off on Sunday and rested all day, and did not have any  symptoms that day. However on Monday, he did his usual activities, and was working from home. Around 3 pm on Monday upon getting up from laying down for a while, he developed room spinning sensation/dizziness and sustained a fall and hit the back of his head, without any loss of consciousness. Ultimately, went to urgent care and the PCP, who then did MRI brain which revealed L cerebellar infarct. Of note, patient has been having intermittent episodes of NBNB, after being on a fentanyl patch for his spinal stenosis/chronic back pain. After being on fentanyl for a few weeks, he would discontinue therapy 2/2 nausea/vomiting. Last fentanyl patch removed ~2 weeks. ago. Otherwise denies fevers, chills, chest pain, shortness of breath, recent illnesses/ hospitalizations, n/v/d, abd pain, dysuria, hematuria, or any other complaints at this time.     In the ED, vitals were 98F, HR 93/min, /93 mmHg, RR 17, 96% on room air. CT angio performed which revealed acute/subacute infarction inferomedial left cerebellum. CT angio performed which revealed no flow in right distal V1 and prox V2 segments and left distal V2 segments which may present high grade stenosis vs. occlusions. Labs s/f leukoctyosis WBC 16k, BMP with elevated glucose to 160s. In the ED, received 325 mg ASA, and 75mg Plavix. Transferred to  for further monitoring (19 Nov 2019 21:30)                            13.2   13.45 )-----------( 329      ( 21 Nov 2019 06:14 )             41.6       11-21    136  |  103  |  17  ----------------------------<  143<H>  4.0   |  23  |  1.01    Ca    9.4      21 Nov 2019 06:14  Mg     1.9     11-21    TPro  7.7  /  Alb  3.8  /  TBili  0.2  /  DBili  x   /  AST  19  /  ALT  21  /  AlkPhos  89  11-19    Vital Signs Last 24 Hrs  T(C): 36.5 (21 Nov 2019 13:40), Max: 36.8 (20 Nov 2019 17:53)  T(F): 97.7 (21 Nov 2019 13:40), Max: 98.3 (20 Nov 2019 17:53)  HR: 70 (21 Nov 2019 11:48) (64 - 75)  BP: 166/76 (21 Nov 2019 11:48) (156/67 - 198/87)  BP(mean): 109 (21 Nov 2019 11:48) (97 - 109)  RR: 19 (21 Nov 2019 11:48) (18 - 20)  SpO2: 98% (21 Nov 2019 11:48) (95% - 98%)    MEDICATIONS  (STANDING):  amLODIPine   Tablet 5 milliGRAM(s) Oral every 24 hours  aspirin enteric coated 81 milliGRAM(s) Oral daily  atorvastatin 80 milliGRAM(s) Oral at bedtime  clopidogrel Tablet 75 milliGRAM(s) Oral every 24 hours  dextrose 5%. 1000 milliLiter(s) (50 mL/Hr) IV Continuous <Continuous>  dextrose 50% Injectable 12.5 Gram(s) IV Push once  enoxaparin Injectable 40 milliGRAM(s) SubCutaneous every 24 hours  insulin lispro (HumaLOG) corrective regimen sliding scale   SubCutaneous Before meals and at bedtime  lidocaine   Patch 1 Patch Transdermal daily  losartan 100 milliGRAM(s) Oral daily  melatonin 5 milliGRAM(s) Oral at bedtime  venlafaxine XR. 37.5 milliGRAM(s) Oral <User Schedule>    MEDICATIONS  (PRN):  dextrose 40% Gel 15 Gram(s) Oral once PRN Blood Glucose LESS THAN 70 milliGRAM(s)/deciliter  glucagon  Injectable 1 milliGRAM(s) IntraMuscular once PRN Glucose LESS THAN 70 milligrams/deciliter    Currently Undergoing Physical Therapy at bedside.    Functional Status Assessment:    Previous Level of Function:     · Ambulation Skills  independent    · Transfer Skills  independent    · ADL Skills  independent    · Work/Leisure Activity  independent    · Additional Comments  Patient lives in elevator building with no steps to enter building. Ambulated with cane PTA. Patient sustained a fall a day before being admitted and hit his head. Patient denies wearing glasses. Patient is right handed            Cognitive Status Examination:   · Orientation  oriented to person, place, time and situation    · Level of Consciousness  alert    · Follows Commands and Answers Questions  100% of the time; able to follow single-step instructions; able to follow multistep instructions      Range of Motion Exam:   · Active Range of Motion Examination  no Active ROM deficits were identified      Manual Muscle Testing:   · Manual Muscle Testing Results  Both UE/LE at least 3+/5      Bed Mobility: Sit to Supine:     · Level of Olden  independent      Bed Mobility: Supine to Sit:     · Level of Olden  independent      Transfer: Sit to Stand:     · Level of Olden  NT due to elevated BP.( see vitals flow sheet). Dr. Castanon medicine intern aware      Gait Skills:     · Level of Olden  TBA      Balance Skills Assessment:     · Sitting Balance: Static  normal balance    · Sitting Balance: Dynamic  normal balance    · Sit-to-Stand Balance  TBA      Sensory Examination:   Sensory Examination:    Grossly Intact:   · Gross Sensory Examination  Grossly Intact; Left UE; Right UE; Left LE; Right LE      · Coordination Assessed  finger to nose; heel to shin; Grossly WNL bilaterally        Proprioception:   · Coordination Assessed  finger to nose; heel to shin; Grossly WNL bilaterally        Treatment Location:   · Comments  smile symmetrical, tongue midline, eyes open/close intact bilaterally, eyebrows raised intact bilaterally, puffs cheeks WNL bilaterally, SILT face, shoulder shrug WNL bilaterally, head rotation intact. Vision: Quadrant test WNL bilaterally, H test WNL.  Coordination: Finger opposition WNL bilaterally          PM&R Impression: as above    Current Disposition Plan Recommendations: pending functional progress

## 2019-11-21 NOTE — PROGRESS NOTE ADULT - PROBLEM SELECTOR PLAN 1
Imaging as reported above and imaging that was done here as above  -ASA, Plavix, Statin  -Pending EULALIO

## 2019-11-21 NOTE — DIETITIAN INITIAL EVALUATION ADULT. - PROBLEM SELECTOR PLAN 5
Pt with newly diagnosed DM, last A1c ~6.9 previously 10.1. On Metformin 750mg at home  - hold metformin  - [facility]  - f/u A1c   - carb consistent diet

## 2019-11-21 NOTE — PROGRESS NOTE ADULT - PROBLEM SELECTOR PLAN 4
Obtain collateral regarding chronicity, downtrending  -Pt afebrile, no overt infectious etiology at this time

## 2019-11-21 NOTE — PROGRESS NOTE ADULT - SUBJECTIVE AND OBJECTIVE BOX
HPI:  Brief Hospital Course:  Pt is a 74M with HTN, newly diagnosed DM (on Metformin), psoriatic arthritis, spinal stenosis (s/p laminectomy 5 years ago at [facility], walks with cane at baseline), presents with intermittent dizziness for the past week on 11/19. Ultimately, went to urgent care and the PCP, who then did MRI brain which revealed L cerebellar infarct. Of note, patient has been having intermittent episodes of NBNB, after being on a fentanyl patch for his spinal stenosis/chronic back pain. After being on fentanyl for a few weeks, he would discontinue therapy 2/2 nausea/vomiting. Last fentanyl patch removed ~2 weeks. ago. In the ED, vitals were 98F, HR 93/min, /93 mmHg, RR 17, 96% on room air. CT angio performed which revealed acute/subacute infarction inferomedial left cerebellum. CT angio performed which revealed no flow in right distal V1 and prox V2 segments and left distal V2 segments which may present high grade stenosis vs. occlusions.     Subjective:  Pt reports some dizziness and very mild nausea. 12 pt ROS is otherwise negative.    Allergies    No Known Allergies    Intolerances    Home meds:    MEDICATIONS  (STANDING):  amLODIPine   Tablet 5 milliGRAM(s) Oral every 24 hours  aspirin enteric coated 81 milliGRAM(s) Oral daily  atorvastatin 80 milliGRAM(s) Oral at bedtime  clopidogrel Tablet 75 milliGRAM(s) Oral every 24 hours  dextrose 5%. 1000 milliLiter(s) (50 mL/Hr) IV Continuous <Continuous>  dextrose 50% Injectable 12.5 Gram(s) IV Push once  enoxaparin Injectable 40 milliGRAM(s) SubCutaneous every 24 hours  insulin lispro (HumaLOG) corrective regimen sliding scale   SubCutaneous Before meals and at bedtime  lidocaine   Patch 1 Patch Transdermal daily  losartan 100 milliGRAM(s) Oral daily  melatonin 5 milliGRAM(s) Oral at bedtime  venlafaxine XR. 37.5 milliGRAM(s) Oral <User Schedule>    MEDICATIONS  (PRN):  dextrose 40% Gel 15 Gram(s) Oral once PRN Blood Glucose LESS THAN 70 milliGRAM(s)/deciliter  glucagon  Injectable 1 milliGRAM(s) IntraMuscular once PRN Glucose LESS THAN 70 milligrams/deciliter    Drug Dosing Weight  Height (cm): 180.34 (19 Nov 2019 20:14)  Weight (kg): 100 (19 Nov 2019 20:14)  BMI (kg/m2): 30.7 (19 Nov 2019 20:14)  BSA (m2): 2.2 (19 Nov 2019 20:14)    PAST MEDICAL & SURGICAL HISTORY:  Leukocytosis  Hypertension  Psoriatic arthritis  Psoriasis  Cholelithiases    PSH:  S/P laparoscopic cholecystectomy: feb 2014  S/P colonoscopy with polypectomy: 2013 adenoma  Ye's cyst of knee, right: 1977  S/P appendectomy: 1977    FAMILY HISTORY:  No pertinent family history in first degree relatives cardiac disease    SOCIAL HISTORY: no smoking    Vital Signs Last 24 Hrs  T(C): 36.7 (21 Nov 2019 06:00), Max: 36.8 (20 Nov 2019 17:53)  T(F): 98.1 (21 Nov 2019 06:00), Max: 98.3 (20 Nov 2019 17:53)  HR: 69 (21 Nov 2019 08:27) (64 - 75)  BP: 156/67 (21 Nov 2019 08:27) (156/67 - 211/83)  BP(mean): 97 (21 Nov 2019 08:27) (97 - 108)  RR: 19 (21 Nov 2019 08:27) (18 - 19)  SpO2: 96% (21 Nov 2019 08:27) (95% - 96%)  I&O's Detail    19 Nov 2019 07:01  -  20 Nov 2019 07:00  --------------------------------------------------------  IN:  Total IN: 0 mL    OUT:    Voided: 525 mL  Total OUT: 525 mL    Total NET: -525 mL          PHYSICAL EXAM:      Constitutional: NAD  Eyes: PERRLA  ENMT: MMM  Neck: supple  Back: midline  Respiratory: CTA b/l  Cardiovascular: rrr, s1s2, no m/r/g  Gastrointestinal: soft, NTND, + BS  Extremities: warm  Vascular: + 2 pulses radial, no edema  Neurological: AAO x 4, 5/5 strength in all extremities, light sensation intact throughout  Skin: no rash  Lymph Nodes: no LAD  Musculoskeletal: no joint swelling  Psychiatric: normal affect    LABS:                        13.2   13.45 )-----------( 329      ( 21 Nov 2019 06:14 )             41.6     11-21    136  |  103  |  17  ----------------------------<  143<H>  4.0   |  23  |  1.01    Ca    9.4      21 Nov 2019 06:14  Mg     1.9     11-21    TPro  7.7  /  Alb  3.8  /  TBili  0.2  /  DBili  x   /  AST  19  /  ALT  21  /  AlkPhos  89  11-19    Color: Yellow / Appearance: Clear / SG: >=1.030 / pH: x  Gluc: x / Ketone: NEGATIVE  / Bili: Negative / Urobili: 0.2 E.U./dL   Blood: x / Protein: Trace mg/dL / Nitrite: NEGATIVE   Leuk Esterase: NEGATIVE / RBC: < 5 /HPF / WBC < 5 /HPF   Sq Epi: x / Non Sq Epi: 0-5 /HPF / Bacteria: Present /HPF    EKG:  NSR     ECHO, US:   1. Normal left and right ventricular size and systolic function. LVEF    65%. Moderate LVH.   2. Trace aortic regurgitation.   3. No pericardial effusion.    RADIOLOGY:  CT head w/o:  Findings compatible with acute/subacute infarction inferomedial left cerebellum. No hydrocephalus at this time.    CTA head/neck:  Again noted is an acute/subacute left inferomedial cerebellar infarct. There is calcified plaque of the bilateral cavernous and supraclinoid internal carotid arteries without significant stenosis. The right A1 segment is hypoplastic. The right A2 and the left anterior cerebral artery are patent without significant stenosis. There is irregularity and narrowing of the bilateral middle cerebral arteries consistent with intracranial atherosclerosis. There is severe stenosis of the left M2 inferior division. There are additional sites of multifocal mild stenosis involving the left M1 and M2 segments. There is multifocal mild stenosis of the right M2 segment. There is mild stenosis of the proximal right V4 segment. There is mild stenosis of the mid basilar artery. There is multifocal mild to moderate narrowing of the bilateral posterior cerebral arteries which is most prominent in the bilateral P2 segments. The left posterior communicating artery is not visualized. The right P1 segment is hypoplastic with a prominent right posterior communicating artery.    CTA NECK: There is a disc osteophyte complex at C4-5 with uncovertebral and facet hypertrophy resulting in mild spinal canal stenosis and severe bilateral neural foraminal narrowing. There is calcified and noncalcified plaque of the aortic arch. There is noncalcified plaque of the bilateral common carotid arteries without significant stenosis. There is noncalcified plaque of the bilateral subclavian arteries without significant stenosis. There is a typical   branching pattern of the aortic arch. There is calcified and noncalcified plaque of the left carotid bifurcation without significant stenosis per NASCET criteria. There is calcified plaque of the distal cervical left internal carotid artery   resulting in mild stenosis. There is severe stenosis of the origin of the left external carotid artery. There is calcified and noncalcified plaque of the right carotid bifurcation without significant stenosis per NASCET criteria. The   remainder of the cervical right internal carotid artery is patent to the level of the skull base. Agree with the findings of the bilateral vertebral arteries of nonopacification of the proximal right vertebral artery just distal to the origin at the level of the calcification with reconstitution of contrast filling at the level of C2-3 disc space which may be due to high grade stenosis or occlusion. There is lack of contrast filling of the left V2 segment the level of C2-3 disc space with reconstitution at the V3 segment.

## 2019-11-21 NOTE — PROGRESS NOTE ADULT - PROBLEM SELECTOR PLAN 2
-Goals/management as per stroke team  -BP goal at this time < 180 (SBP)  -Cozaar 100 mg PO qd, Norvasc 5 mg PO qd

## 2019-11-21 NOTE — PROGRESS NOTE ADULT - ASSESSMENT
per Neurology    74M with PMH psoriatic arthritis, HTN, DM, spinal stenosis, presents with dizziness x1 week, admitted for acute/subacute L cerebellar infarct.     Problem/Plan - 1:  ·  Problem: Cerebellar infarct.  Plan: Pt with symptoms of intermittent dizziness, room spinning sensation x1 week, and unsteady gait (worse than baseline, walks with cane at baseline). Of note, patient had been having episodes of n/v while on fentanyl patch, unknown if related to cerebellar infarct. MRI outpatient with large left cerebellar infarct. Neuro exam fairly unremarkable without any signficant focal neuro deficits, mild dysmetria on left side. CT angio with no flow visualized in the right distal V1 and proximal V2 segments and the left distal V2 segments, which may represent high-grade stenoses versus occlusions. No large vessel occlusion.  - c/w Aspirin 81mg, Plavix 75mg qd  - c/w lipitor 80mg   - f/u lipid profile, Hb A1c, and TSH  - f/u TTE.     Problem/Plan - 2:  ·  Problem: Leukocytosis.  Plan: WBC 16k in the ED, patient non-toxic appearing. May be reactive in setting of CVA  - trend CBC with diff.     Problem/Plan - 3:  ·  Problem: Psoriatic arthritis.  Plan: Pt with h/o Psoriatic arthritis, takes Cosentyx monthly and meloxicam daily. Currently joints without swelling, edema, no evidence of flare   - Tylenol PRN for pain.     Problem/Plan - 4:  ·  Problem: Hypertension.  Plan: Patient with history of HTN, on Losartan 100mg qd at home. Blood pressures elevated on arrival to 200s, improved to 170s while sleeping.   - BP goals 160-180s   - restarted Losartan 100mg in AM  - added 5mg amlodipine.     Problem/Plan - 5:  ·  Problem: Diabetes mellitus.  Plan: Pt with newly diagnosed DM, last A1c ~6.9 previously 10.1. On Metformin 750mg at home  - hold metformin  - MISS  - A1c 6.9   - carb consistent diet.     Problem/Plan - 6:  Problem: Depression. Plan: Pt w/ history of depression, anxiety. Takes Venlafaxine 37.5 mg every other day, Ativan 1mg BID, and ambien 10mg qhs.   - resume venlafaxine 37.5mg every other day.   - hold Ativan  in setting of acute CVA.    Problem/Plan - 7:  ·  Problem: Nutrition, metabolism, and development symptoms.  Plan: F: No IVF  E: Replete PRN  N: DASH/TLC w/ Carb consistent  DVT ppx: SCDs, lovenox  Code: Full Code;  Dispo: [facility].

## 2019-11-22 ENCOUNTER — TRANSCRIPTION ENCOUNTER (OUTPATIENT)
Age: 74
End: 2019-11-22

## 2019-11-22 VITALS — TEMPERATURE: 98 F

## 2019-11-22 LAB
GLUCOSE BLDC GLUCOMTR-MCNC: 141 MG/DL — HIGH (ref 70–99)
GLUCOSE BLDC GLUCOMTR-MCNC: 146 MG/DL — HIGH (ref 70–99)

## 2019-11-22 PROCEDURE — 93306 TTE W/DOPPLER COMPLETE: CPT

## 2019-11-22 PROCEDURE — 97140 MANUAL THERAPY 1/> REGIONS: CPT

## 2019-11-22 PROCEDURE — 99285 EMERGENCY DEPT VISIT HI MDM: CPT | Mod: 25

## 2019-11-22 PROCEDURE — 97161 PT EVAL LOW COMPLEX 20 MIN: CPT

## 2019-11-22 PROCEDURE — 80048 BASIC METABOLIC PNL TOTAL CA: CPT

## 2019-11-22 PROCEDURE — 86803 HEPATITIS C AB TEST: CPT

## 2019-11-22 PROCEDURE — 83036 HEMOGLOBIN GLYCOSYLATED A1C: CPT

## 2019-11-22 PROCEDURE — 84443 ASSAY THYROID STIM HORMONE: CPT

## 2019-11-22 PROCEDURE — 97116 GAIT TRAINING THERAPY: CPT

## 2019-11-22 PROCEDURE — 70450 CT HEAD/BRAIN W/O DYE: CPT

## 2019-11-22 PROCEDURE — 86900 BLOOD TYPING SEROLOGIC ABO: CPT

## 2019-11-22 PROCEDURE — 36415 COLL VENOUS BLD VENIPUNCTURE: CPT

## 2019-11-22 PROCEDURE — 97530 THERAPEUTIC ACTIVITIES: CPT

## 2019-11-22 PROCEDURE — 85610 PROTHROMBIN TIME: CPT

## 2019-11-22 PROCEDURE — 86901 BLOOD TYPING SEROLOGIC RH(D): CPT

## 2019-11-22 PROCEDURE — 80053 COMPREHEN METABOLIC PANEL: CPT

## 2019-11-22 PROCEDURE — 85025 COMPLETE CBC W/AUTO DIFF WBC: CPT

## 2019-11-22 PROCEDURE — 93312 ECHO TRANSESOPHAGEAL: CPT

## 2019-11-22 PROCEDURE — 99238 HOSP IP/OBS DSCHRG MGMT 30/<: CPT

## 2019-11-22 PROCEDURE — 80061 LIPID PANEL: CPT

## 2019-11-22 PROCEDURE — 83735 ASSAY OF MAGNESIUM: CPT

## 2019-11-22 PROCEDURE — 82962 GLUCOSE BLOOD TEST: CPT

## 2019-11-22 PROCEDURE — 86850 RBC ANTIBODY SCREEN: CPT

## 2019-11-22 PROCEDURE — 85027 COMPLETE CBC AUTOMATED: CPT

## 2019-11-22 PROCEDURE — 70496 CT ANGIOGRAPHY HEAD: CPT

## 2019-11-22 PROCEDURE — 70498 CT ANGIOGRAPHY NECK: CPT

## 2019-11-22 PROCEDURE — 93005 ELECTROCARDIOGRAM TRACING: CPT

## 2019-11-22 PROCEDURE — 85730 THROMBOPLASTIN TIME PARTIAL: CPT

## 2019-11-22 PROCEDURE — 81001 URINALYSIS AUTO W/SCOPE: CPT

## 2019-11-22 RX ORDER — ACETAMINOPHEN 500 MG
650 TABLET ORAL ONCE
Refills: 0 | Status: COMPLETED | OUTPATIENT
Start: 2019-11-22 | End: 2019-11-22

## 2019-11-22 RX ORDER — CLOPIDOGREL BISULFATE 75 MG/1
1 TABLET, FILM COATED ORAL
Qty: 30 | Refills: 0
Start: 2019-11-22 | End: 2019-12-21

## 2019-11-22 RX ORDER — ASPIRIN/CALCIUM CARB/MAGNESIUM 324 MG
1 TABLET ORAL
Qty: 30 | Refills: 0
Start: 2019-11-22 | End: 2019-12-21

## 2019-11-22 RX ORDER — AMLODIPINE BESYLATE 2.5 MG/1
1 TABLET ORAL
Qty: 30 | Refills: 0
Start: 2019-11-22 | End: 2019-12-21

## 2019-11-22 RX ORDER — ATORVASTATIN CALCIUM 80 MG/1
1 TABLET, FILM COATED ORAL
Qty: 30 | Refills: 0
Start: 2019-11-22 | End: 2019-12-21

## 2019-11-22 RX ADMIN — LOSARTAN POTASSIUM 100 MILLIGRAM(S): 100 TABLET, FILM COATED ORAL at 06:14

## 2019-11-22 RX ADMIN — Medication 37.5 MILLIGRAM(S): at 11:42

## 2019-11-22 RX ADMIN — Medication 650 MILLIGRAM(S): at 04:34

## 2019-11-22 RX ADMIN — ENOXAPARIN SODIUM 40 MILLIGRAM(S): 100 INJECTION SUBCUTANEOUS at 07:05

## 2019-11-22 RX ADMIN — Medication 650 MILLIGRAM(S): at 05:41

## 2019-11-22 RX ADMIN — Medication 81 MILLIGRAM(S): at 11:42

## 2019-11-22 RX ADMIN — LIDOCAINE 1 PATCH: 4 CREAM TOPICAL at 11:42

## 2019-11-22 NOTE — DISCHARGE NOTE PROVIDER - NSDCFUADDAPPT_GEN_ALL_CORE_FT
You have a follow up appointment scheduled with Dr. Yoon's nurse practitioner, Daria, on December 5, 2019 3:30pm    Please also follow up with your own Primary care physician.

## 2019-11-22 NOTE — DISCHARGE NOTE PROVIDER - NSDCMRMEDTOKEN_GEN_ALL_CORE_FT
Ambien 5 mg oral tablet: 1 tab(s) orally once a day (at bedtime) MDD:5mg  amLODIPine 5 mg oral tablet: 1 tab(s) orally every 24 hours  ascorbic acid 500 mg oral tablet: 1 tab(s) orally once a day  aspirin 81 mg oral delayed release tablet: 1 tab(s) orally once a day  Ativan 1 mg oral tablet: 1 milligram(s) orally 2 times a day  atorvastatin 80 mg oral tablet: 1 tab(s) orally once a day (at bedtime)  clopidogrel 75 mg oral tablet: 1 tab(s) orally every 24 hours  Cosentyx: 300 milligram(s) subcutaneous every 4 weeks  losartan 100 mg oral tablet: 1 tab(s) orally once a day  meloxicam 15 mg oral tablet: 1 tab(s) orally once a day  Multiple Vitamins oral tablet: 1 tab(s) orally once a day  venlafaxine 37.5 mg oral tablet: 1 tab(s) orally every other day  zolpidem 5 mg oral tablet: 1 tab(s) orally once a day (at bedtime)

## 2019-11-22 NOTE — DISCHARGE NOTE NURSING/CASE MANAGEMENT/SOCIAL WORK - PATIENT PORTAL LINK FT
You can access the FollowMyHealth Patient Portal offered by [facility] by registering at the following website: [email or web address]. By joining [employer]’s FollowMyHealth portal, you will also be able to view your health information using other applications (apps) compatible with our system. no

## 2019-11-22 NOTE — DISCHARGE NOTE PROVIDER - NSDCCPCAREPLAN_GEN_ALL_CORE_FT
PRINCIPAL DISCHARGE DIAGNOSIS  Diagnosis: Cerebrovascular accident (CVA), unspecified mechanism  Assessment and Plan of Treatment: You had a stroke as confirmed on brain MRI and CT. We are discharging you on the following medications: Aspirin 81mg, Plavix 75mg and Atorvastatin 80mg. Please take these medications once daily. Please follow up with Dr. Yoon's nurse practitioner, Daria, on December 5, 2019 at 3:30pm for further coordination of the tests that you need as an outpatient including the loop recorder placement and MR NOVA. Please don't hesitate to contact Dr. Yoon's office for any further questions. His office information will be included with your discharge paperwork.      SECONDARY DISCHARGE DIAGNOSES  Diagnosis: Essential hypertension  Assessment and Plan of Treatment: You are taking Losartan 100mg at home. For better blood pressure control, we have also added on amlodipine 5mg daily. Please continue to take these medications as prescribed. Follow up with your primary care physician for regular blood pressure monitoring. If you have a blood pressure monitor at home, it is helpful to check your blood pressure regularly.    Diagnosis: Psoriatic arthritis  Assessment and Plan of Treatment: Please continue with your prescribed medications and follow up with your primary care physician or rheumatologist.    Diagnosis: Leukocytosis  Assessment and Plan of Treatment: You were found to have an elevated white blood cell count. You aren't showing signs of an active infection, and currently do not need to be treated. This will likely resolved on its own.    Diagnosis: Type 2 diabetes mellitus  Assessment and Plan of Treatment: You have newly diagnosed type 2 diabetes. Please continue to take metformin as prescribed once you are discharged. Please continue to follow up with your primary care physician for further outpatient management.    Diagnosis: Obesity  Assessment and Plan of Treatment: Your current BMI indicates that you are obese. This can be managed with lifestyle modifications especially in terms of diet and exercise.    Diagnosis: Anemia  Assessment and Plan of Treatment: You have a mild anemia. Please follow with your primary care physician for further management.    Diagnosis: Depression  Assessment and Plan of Treatment: Please continue to take your medications as prescribed, and continue to follow up with your primary care physician. PRINCIPAL DISCHARGE DIAGNOSIS  Diagnosis: Cerebellar stroke  Assessment and Plan of Treatment: You had a cerebellar stroke as confirmed on brain MRI and CT. We are discharging you on the following medications: Aspirin 81mg, Plavix 75mg and Atorvastatin 80mg. Please take these medications once daily. Please follow up with Dr. Yoon's nurse practitioner, Daria, on December 5, 2019 at 3:30pm for further coordination of the tests that you need as an outpatient including the loop recorder placement and MR NOVA. Please don't hesitate to contact Dr. Yoon's office for any further questions. His office information will be included with your discharge paperwork.      SECONDARY DISCHARGE DIAGNOSES  Diagnosis: Essential hypertension  Assessment and Plan of Treatment: You are taking Losartan 100mg at home. For better blood pressure control, we have also added on amlodipine 5mg daily. Please continue to take these medications as prescribed. Follow up with your primary care physician for regular blood pressure monitoring. If you have a blood pressure monitor at home, it is helpful to check your blood pressure regularly.    Diagnosis: Psoriatic arthritis  Assessment and Plan of Treatment: Please continue with your prescribed medications and follow up with your primary care physician or rheumatologist.    Diagnosis: Leukocytosis  Assessment and Plan of Treatment: You were found to have an elevated white blood cell count. You aren't showing signs of an active infection, and currently do not need to be treated. This will likely resolved on its own.    Diagnosis: Type 2 diabetes mellitus  Assessment and Plan of Treatment: You have newly diagnosed type 2 diabetes. Please continue to take metformin as prescribed once you are discharged. Please continue to follow up with your primary care physician for further outpatient management.    Diagnosis: Obesity  Assessment and Plan of Treatment: Your current BMI indicates that you are obese. This can be managed with lifestyle modifications especially in terms of diet and exercise.    Diagnosis: Anemia  Assessment and Plan of Treatment: You have a mild anemia. Please follow with your primary care physician for further management.    Diagnosis: Depression  Assessment and Plan of Treatment: Please continue to take your medications as prescribed, and continue to follow up with your primary care physician.

## 2019-11-22 NOTE — DISCHARGE NOTE PROVIDER - HOSPITAL COURSE
Patient is 75 yo M with HTN, newly diagnosed DM (on metformin), psoriatic arthritis, spinal stenosis (s/p laminectomy 5 years ago, ambulates with cane).    Experienced dizziness and vertigo then sustained a mechanical fall hitting the back of his head, without LOC. He presented to urgent care and a brain MRI showed a L cerebellar infarct.            Problem List/Main Diagnoses (system-based):     Inpatient treatment course:     New medications:     Labs to be followed outpatient:     Exam to be followed outpatient:                                             74M with HTN, newly diagnosed DM (on Metformin), psoriatic arthritis, spinal stenosis (s/p laminectomy 5 years ago at [facility], walks with cane at baseline), presents with intermittent dizziness for the past week. However, he noticed on Saturday,~3 days ago, while he was sitting at the desk, he developed sudden onset left leg numbness and perioral numbness. Initially he thought it was related to low blood sugar, he had some food/juice which helped with the symptoms. However upon getting up, he developed dizziness. He took the day off on Sunday and rested all day, and did not have any  symptoms that day. However on Monday, he did his usual activities, and was working from home. Around 3 pm on Monday upon getting up from laying down for a while, he developed room spinning sensation/dizziness and sustained a fall and hit the back of his head, without any loss of consciousness. Ultimately, went to urgent care and the PCP, who then did MRI brain which revealed L cerebellar infarct. Of note, patient has been having intermittent episodes of NBNB, after being on a fentanyl patch for his spinal stenosis/chronic back pain. After being on fentanyl for a few weeks, he would discontinue therapy 2/2 nausea/vomiting. Last fentanyl patch removed ~2 weeks. ago. Otherwise denies fevers, chills, chest pain, shortness of breath, recent illnesses/ hospitalizations, n/v/d, abd pain, dysuria, hematuria, or any other complaints at this time.         In the ED, vitals were 98F, HR 93/min, /93 mmHg, RR 17, 96% on room air. CT angio performed which revealed acute/subacute infarction inferomedial left cerebellum. CT angio performed which revealed no flow in right distal V1 and prox V2 segments and left distal V2 segments which may present high grade stenosis vs. occlusions. Labs s/f leukoctyosis WBC 16k, BMP with elevated glucose to 160s. In the ED, received 325 mg ASA, and 75mg Plavix. Transferred to  for further monitoring Patient is 73 yo M with HTN, newly diagnosed DM (on metformin), psoriatic arthritis, spinal stenosis (s/p laminectomy 5 years ago, ambulates with cane).    Experienced dizziness and vertigo then sustained a mechanical fall hitting the back of his head, without LOC. He presented to urgent care and a brain MRI showed a L cerebellar infarct.        Problem List/Main Diagnoses (system-based):     1.) CVA    -outside MRI showed cerebellar stroke    -CTH showed acute/subacute infarction inferomedial left cerebellum    -CT angio showed high grade stenosis vs occlusion    -pending MR NOVA which can be done outpatient    -pending loop recorder placement which     can be done outpatient        2.)Essential hypertension    -for better blood pressure control, we added amlodipine 5mg qd        New medications:     -Aspirin 81mg daily    -Clopidogrel 75mg daily    -Atorvastatin 80mg daily    -Amlodipine 5mg daily        Labs to be followed outpatient:     Exam to be followed outpatient: MR NOVA, blood pressure, EKG Patient is 73 yo M with HTN, newly diagnosed DM (on metformin), psoriatic arthritis, spinal stenosis (s/p laminectomy 5 years ago, ambulates with cane).    Experienced dizziness and vertigo then sustained a mechanical fall hitting the back of his head, without LOC. He presented to urgent care and a brain MRI showed a L cerebellar infarct.        Problem List/Main Diagnoses (system-based):     1.) CVA    -outside MRI showed cerebellar stroke    -CTH showed acute/subacute infarction inferomedial left cerebellum    -CT angio showed high grade stenosis vs occlusion    -pending MR NOVA which can be done outpatient    -pending loop recorder placement which     can be done outpatient        2.)Essential hypertension    -for better blood pressure control, we added amlodipine 5mg qd        New medications:     -Aspirin 81mg daily    -Clopidogrel 75mg daily    -Atorvastatin 80mg daily    -Amlodipine 5mg daily        to be followed outpatient: MR NOVA, blood pressure, EKG , loop recorder placement

## 2019-11-22 NOTE — DISCHARGE NOTE PROVIDER - CARE PROVIDER_API CALL
Parvin Yoon)  Neurology; Vascular Neurology  [address], [address] [place] [address]  Phone: [phone]  Fax: [phone]  Follow Up Time:

## 2019-11-26 ENCOUNTER — INBOUND DOCUMENT (OUTPATIENT)
Age: 74
End: 2019-11-26

## 2019-11-27 DIAGNOSIS — F32.9 MAJOR DEPRESSIVE DISORDER, SINGLE EPISODE, UNSPECIFIED: ICD-10-CM

## 2019-11-27 DIAGNOSIS — G89.29 OTHER CHRONIC PAIN: ICD-10-CM

## 2019-11-27 DIAGNOSIS — R27.0 ATAXIA, UNSPECIFIED: ICD-10-CM

## 2019-11-27 DIAGNOSIS — L40.50 ARTHROPATHIC PSORIASIS, UNSPECIFIED: ICD-10-CM

## 2019-11-27 DIAGNOSIS — E11.9 TYPE 2 DIABETES MELLITUS WITHOUT COMPLICATIONS: ICD-10-CM

## 2019-11-27 DIAGNOSIS — D72.829 ELEVATED WHITE BLOOD CELL COUNT, UNSPECIFIED: ICD-10-CM

## 2019-11-27 DIAGNOSIS — R11.10 VOMITING, UNSPECIFIED: ICD-10-CM

## 2019-11-27 DIAGNOSIS — M48.00 SPINAL STENOSIS, SITE UNSPECIFIED: ICD-10-CM

## 2019-11-27 DIAGNOSIS — E66.9 OBESITY, UNSPECIFIED: ICD-10-CM

## 2019-11-27 DIAGNOSIS — Z79.84 LONG TERM (CURRENT) USE OF ORAL HYPOGLYCEMIC DRUGS: ICD-10-CM

## 2019-11-27 DIAGNOSIS — I63.9 CEREBRAL INFARCTION, UNSPECIFIED: ICD-10-CM

## 2019-11-27 DIAGNOSIS — M54.9 DORSALGIA, UNSPECIFIED: ICD-10-CM

## 2019-11-27 DIAGNOSIS — Z91.81 HISTORY OF FALLING: ICD-10-CM

## 2019-11-27 DIAGNOSIS — Z90.49 ACQUIRED ABSENCE OF OTHER SPECIFIED PARTS OF DIGESTIVE TRACT: ICD-10-CM

## 2019-11-27 DIAGNOSIS — D64.9 ANEMIA, UNSPECIFIED: ICD-10-CM

## 2019-11-27 DIAGNOSIS — I63.542 CEREBRAL INFARCTION DUE TO UNSPECIFIED OCCLUSION OR STENOSIS OF LEFT CEREBELLAR ARTERY: ICD-10-CM

## 2019-11-27 DIAGNOSIS — I10 ESSENTIAL (PRIMARY) HYPERTENSION: ICD-10-CM

## 2019-12-05 ENCOUNTER — APPOINTMENT (OUTPATIENT)
Dept: NEUROLOGY | Facility: CLINIC | Age: 74
End: 2019-12-05
Payer: MEDICARE

## 2019-12-05 ENCOUNTER — NON-APPOINTMENT (OUTPATIENT)
Age: 74
End: 2019-12-05

## 2019-12-05 VITALS
HEIGHT: 72 IN | HEART RATE: 95 BPM | DIASTOLIC BLOOD PRESSURE: 79 MMHG | BODY MASS INDEX: 31.42 KG/M2 | WEIGHT: 232 LBS | SYSTOLIC BLOOD PRESSURE: 135 MMHG | TEMPERATURE: 98.5 F | OXYGEN SATURATION: 95 %

## 2019-12-05 PROCEDURE — 99214 OFFICE O/P EST MOD 30 MIN: CPT

## 2019-12-05 RX ORDER — ZOLPIDEM TARTRATE 5 MG/1
5 TABLET ORAL
Qty: 30 | Refills: 0 | Status: ACTIVE | COMMUNITY

## 2019-12-06 ENCOUNTER — OTHER (OUTPATIENT)
Age: 74
End: 2019-12-06

## 2019-12-09 NOTE — HISTORY OF PRESENT ILLNESS
[FreeTextEntry1] : 74 year old right handed male patient with a past medical history of HTN, DM, psoriatic arthritis, spinal stenosis, (s/p laminectomy 5 years ago, ambulates with cane) presents for a post hospital follow up visit for CVA. Patient was admitted on 11/19- 11/22 at [facility].\par \par Patient had reportedly experienced dizziness and vertigo- he fell and hit the back of his head. He state he had LOC for a "nanosecond". He reported he had on the 17th experienced  on saturday night left sided numbness on his mouth and left leg. Always used a cane as recommended by his pain management. \par \par Patient reports daily adherence to CVA medications, which include:\par -Aspirin/Plavix: denies any blood in urine and stool\par -Atorvastatin 80mg: denies muscle cramps or pain\par BP is within range; takes amlodipine 5mg with losartan 100mg.  \par \par Diet: says he tries to eat well. \par Patient reports he had a CPAP machine back in 2016- stopped using it. Denies snoring. \par

## 2019-12-09 NOTE — ASSESSMENT
[FreeTextEntry1] : 74 year old right handed male patient with a past medical history of HTN, DM, psoriatic arthritis, spinal stenosis, (s/p laminectomy 5 years ago, ambulates with cane) presents for a post hospital follow up visit for CVA. Patient was admitted on 11/19- 11/22 at [facility]. Neurological examination is stable; gait unsteady due to back and uses cane to ambulate.\par \par Plan for Stroke Factors\par -STOP Score/Sleep study:  Discussed the risks of untreated sleep apnea and one of the likelihood cause of strokes. Patient advised to follow up with sleep specialist. \par -Exercise Rx ([place]): patient prescribed an 8 week regimen of exercises to improve strength and symptoms from the stroke. \par -Physical Therapy referral\par -Carotid Doppler ultrasound: discussed with patient the importance and need for ultrasound; if normal, repeat in a year. If abnormal, will notify patient. Repeat in 6 months. \par -EP referral for EKG and Loop Recorder\par -MRA head w/o contrast \par -Continue dual therapy: aspirin/plavix\par -Continue atorvastatin 80mg; LDL goal <70\par -BP control; patient advised to keep BP log at home and continue f/u with PCP and medication adjustments if needed. Goal <130/80. \par \par f/u in 6 weeks \par \par \par

## 2019-12-09 NOTE — PHYSICAL EXAM
[Sclera] : the sclera and conjunctiva were normal [PERRL With Normal Accommodation] : pupils were equal in size, round, reactive to light, with normal accommodation [Extraocular Movements] : extraocular movements were intact [Strabismus] : no strabismus was seen [Full Visual Field] : full visual field [Outer Ear] : the ears and nose were normal in appearance [Hearing Threshold Finger Rub Not [place]] : hearing was normal [Examination Of The Oral Cavity] : the lips and gums were normal [Neck Appearance] : the appearance of the neck was normal [Neck Cervical Mass (___cm)] : no neck mass was observed [] : no respiratory distress [Respiration, Rhythm And Depth] : normal respiratory rhythm and effort [Exaggerated Use Of Accessory Muscles For Inspiration] : no accessory muscle use [Auscultation Breath Sounds / Voice Sounds] : lungs were clear to auscultation bilaterally [Heart Rate And Rhythm] : heart rate was normal and rhythm regular [Heart Sounds] : normal S1 and S2 [Arterial Pulses Carotid] : carotid pulses were normal with no bruits [FreeTextEntry1] : Constitutional: alert, in no acute distress, well nourished and well developed.\par Psychiatric:  insight and judgment were intact.\par \par Neurologic: \par Mental Status: The patient is alert, attentive, and oriented to person, place, and time. Speech is clear and fluent with good repetition, comprehension, and naming.  \par Cranial nerves:\par CN II: Visual fields are full to confrontation. Visual acuity is intact. \par CN III, IV, VI: EOMI, PERRLA\par CN V: Facial sensation is intact to pinprick in all 3 divisions bilaterally. Corneal responses are intact.\par CN VII: Face is symmetric with normal eye closure and smile.\par CN VIII: Hearing is normal to rubbing fingers\par CN IX, X: Palate elevates symmetrically. Phonation is normal.\par CN XI: Head turning and shoulder shrug are intact and symmetric.\par CN XII: Tongue is midline with normal movements and no atrophy and no deviation with protrusion.\par Motor: There is no pronator drift of out-stretched arms. Muscle bulk and tone is normal. Strength is full bilaterally 5/5 on both UE and both LE. \par Sensation: light touch is intact; pinprick intact; vibration b/l intact.\par Reflexes:Reflexes are 2+ and symmetric at the biceps, triceps, knees, and ankles.\par Coordination: Rapid alternating movements and fine finger movements are intact. There is no dysmetria on finger-to-nose and heel-knee-shin. There are no abnormal or extraneous movements. Negative Romberg. \par Gait/Stance: Posture is normal. Gait uses cane to ambulate. \par \par \par

## 2019-12-09 NOTE — DATA REVIEWED
[de-identified] : EXAM:  CT ANGIO BRAIN (W)AW IC                      \par \par EXAM:  CT ANGIO NECK (W)AW IC                      \par \par PROCEDURE DATE:  11/19/2019  \par \par \par \par INTERPRETATION:  I, Red Mancini MD, have reviewed the images and \par the report and agree with the findings, with the following modification: \par \par CTA HEAD:\par \par Again noted is an acute/subacute left inferomedial cerebellar infarct.\par \par There is calcified plaque of the bilateral cavernous and supraclinoid \par internal carotid arteries without significant stenosis.\par \par The right A1 segment is hypoplastic. The right A2 and the left anterior \par cerebral artery are patent without significant stenosis. There is \par irregularity and narrowing of the bilateral middle cerebral arteries \par consistent with intracranial atherosclerosis. There is severe stenosis of \par the left M2 inferior division. There are additional sites of multifocal \par mild stenosis involving the left M1 and M2 segments. There is multifocal \par mild stenosis of the right M2 segment.\par \par There is mild stenosis of the proximal right V4 segment. There is mild \par stenosis of the mid basilar artery. There is multifocal mild to moderate \par narrowing of the bilateral posterior cerebral arteries which is most \par prominent in the bilateral P2 segments. The left posterior communicating \par artery is not visualized. The right P1 segment is hypoplastic with a \par prominent right posterior communicating artery.\par \par CTA NECK:\par \par There is a disc osteophyte complex at C4-5 with uncovertebral and facet \par hypertrophy resulting in mild spinal canal stenosis and severe bilateral \par neural foraminal narrowing.\par \par There is calcified and noncalcified plaque of the aortic arch. There is \par noncalcified plaque of the bilateral common carotid arteries without \par significant stenosis. There is noncalcified plaque of the bilateral \par subclavian arteries without significant stenosis. There is a typical \par branching pattern of the aortic arch.\par \par There is calcified and noncalcified plaque of the left carotid \par bifurcation without significant stenosis per NASCET criteria. There is \par calcified plaque of the distal cervical left internal carotid artery \par resulting in mild stenosis. There is severe stenosis of the origin of the \par left external carotid artery.\par \par There is calcified and noncalcified plaque of the right carotid \par bifurcation without significant stenosis per NASCET criteria. The \par remainder of the cervical right internal carotid artery is patent to the \par level of the skull base.\par \par Agree with the findings of the bilateral vertebral arteries of \par nonopacification of the proximal right vertebral artery just distal to \par the origin at the level of the calcification with reconstitution of \par contrast filling at the level of C2-3 disc space which may be due to high \par grade stenosis or occlusion. There is lack of contrast filling of the \par left V2 segment the level of C2-3 disc space with reconstitution at the \par V3 segment.\par \par Findings were discussed with SYLWIA Luna by Dr. Red Mancini on \par 11/20/2019 10:03 AM \par \par PROCEDURE: CTA brain with intravenous contrast.\par \par INDICATION: Dizziness.\par \par TECHNIQUE: Multiple thin section axial images were obtained through the \par [place] following the intravenous injection of contrast. MPR \par sagittal and coronal MIP images were generated from the axial images. \par Approximately 100 cc of Optiray 350 were administered.\par \par COMPARISON: None.\par \par FINDINGS: The CTA examination of the [place] demonstrates the \par internal carotid arteries to be normal in caliber. The left internal \par carotid artery bifurcates into two anterior communicating arteries. There \par is a normal bifurcation into the A1 and M1 segments. There is a normal \par MCA bifurcation. There is a right fetal PCOM. The basilar artery is \par normal in caliber. There is a normal bifurcation into the posterior \par cerebral arteries. There are no areas of stenosis, dilatation or aneurysm.\par \par IMPRESSION: No large vessel occlusion. See discussion of vertebral \par arteries below.\par \par \par PROCEDURE: CTA neck with intravenous contrast.\par \par INDICATION: Dizziness.\par \par TECHNIQUE: Multiple axial thin section were obtained through the neck \par following the intravenous injection of contrast. MPR sagittal and coronal \par MIP images were generated from the axial images. Approximately 100 cc of \par Optiray 350 were administered.\par \par COMPARISON: None.\par \par FINDINGS: The CTA examination demonstrates the right common carotid \par artery to be normal in caliber. There is a normal bifurcation into the \par right internal and external carotid arteries. There is calcification at \par the carotid bifurcation without hemodynamically significant stenosis. \par \par The left common carotid artery is normal in caliber. There is a normal \par bifurcation into the left internal and external carotid arteries. There \par is calcification at the carotid bifurcation without hemodynamically \par significant stenosis. \par \par There is calcification right vertebral artery just past its origin. There \par is then no visualized flow distal to this point in the right V1 and V2 \par until it reaches the level of the C3 vertebral body where it seen to \par reconstitute flow. There are multiple tandem stenoses of the distal V3/V4 \par segments as well. The left V1 and proximal V2 segments are patent. \par However, there is no flow visualized in the left V2 segment at the level \par of the C2/3 vertebral bodies with complete reconstitution at the level of \par the left V3/4 vertebral arteries.\par \par The aortic arch appears intact without narrowing of the origin of the \par great vessels.\par \par IMPRESSION: There is no flow visualized in the right distal V1 and \par proximal V2 segments and the left distal V2 segments, which may represent \par high-grade stenoses versus occlusions. There are additional areas of \par tandem stenoses bilaterally. [de-identified] : Labs 11/2019\par A1C 6.9\par Tot Chol 174\par \par HDL 42\par LDL 95

## 2019-12-11 ENCOUNTER — APPOINTMENT (OUTPATIENT)
Dept: NEUROLOGY | Facility: CLINIC | Age: 74
End: 2019-12-11
Payer: MEDICARE

## 2019-12-11 PROCEDURE — 93880 EXTRACRANIAL BILAT STUDY: CPT

## 2019-12-13 ENCOUNTER — OTHER (OUTPATIENT)
Age: 74
End: 2019-12-13

## 2019-12-27 ENCOUNTER — OUTPATIENT (OUTPATIENT)
Dept: OUTPATIENT SERVICES | Facility: HOSPITAL | Age: 74
LOS: 1 days | End: 2019-12-27
Payer: MEDICARE

## 2019-12-27 ENCOUNTER — APPOINTMENT (OUTPATIENT)
Dept: MRI IMAGING | Facility: HOSPITAL | Age: 74
End: 2019-12-27
Payer: MEDICARE

## 2019-12-27 ENCOUNTER — APPOINTMENT (OUTPATIENT)
Dept: NEUROLOGY | Facility: CLINIC | Age: 74
End: 2019-12-27
Payer: MEDICARE

## 2019-12-27 VITALS
HEIGHT: 72 IN | BODY MASS INDEX: 31.42 KG/M2 | DIASTOLIC BLOOD PRESSURE: 64 MMHG | SYSTOLIC BLOOD PRESSURE: 122 MMHG | HEART RATE: 92 BPM | WEIGHT: 232 LBS | OXYGEN SATURATION: 96 %

## 2019-12-27 DIAGNOSIS — M71.21 SYNOVIAL CYST OF POPLITEAL SPACE [BAKER], RIGHT KNEE: Chronic | ICD-10-CM

## 2019-12-27 DIAGNOSIS — Z98.89 OTHER SPECIFIED POSTPROCEDURAL STATES: Chronic | ICD-10-CM

## 2019-12-27 PROCEDURE — 70544 MR ANGIOGRAPHY HEAD W/O DYE: CPT | Mod: 26

## 2019-12-27 PROCEDURE — 70544 MR ANGIOGRAPHY HEAD W/O DYE: CPT

## 2019-12-27 PROCEDURE — 99214 OFFICE O/P EST MOD 30 MIN: CPT

## 2019-12-27 NOTE — PHYSICAL EXAM
[FreeTextEntry1] : 72 she is very pleasant gentleman by his wife he appears to have left ataxia unsteadiness of walking his electrical movements on the atherosclerotic changes he protrudes tongue in the midline he has slight hyperreflexia involving his left upper extremity muscles with decreased reflexes left lower extremity and equivocal left toe response. He has mild dysmetria more so on the left than on the right with mild incoordination and left-sided tremor without evidence of bruits.

## 2019-12-27 NOTE — ASSESSMENT
[FreeTextEntry1] : As a neutral gentleman with a history of cerebellar infarct at this time he will follow through with both neurosurgery and rehabilitation the question remains as to whether his anticoagulation is sufficient we'll whether he would be an appropriate candidate for stenting. I spoke with his son spoke with the neurosurgeon and I will see him within a week for followup he will continue on his Plavix and aspirin in the interim.

## 2019-12-27 NOTE — HISTORY OF PRESENT ILLNESS
[FreeTextEntry1] : System atrial child with a history of a cerebrovascular accident\par \par He is left with weakness involving left side of his body unsteadiness no specific difficulties involving language remote and some dysarthric speech. He has a history of spinal stenosis is not currently receiving physical therapy is on Plavix and aspirin I have seen multiple radiological studies including those from his hospitalization.

## 2020-01-02 ENCOUNTER — APPOINTMENT (OUTPATIENT)
Dept: NEUROSURGERY | Facility: CLINIC | Age: 75
End: 2020-01-02
Payer: MEDICARE

## 2020-01-02 PROCEDURE — 99205 OFFICE O/P NEW HI 60 MIN: CPT

## 2020-01-07 ENCOUNTER — FORM ENCOUNTER (OUTPATIENT)
Age: 75
End: 2020-01-07

## 2020-01-08 ENCOUNTER — APPOINTMENT (OUTPATIENT)
Dept: PULMONOLOGY | Facility: CLINIC | Age: 75
End: 2020-01-08
Payer: MEDICARE

## 2020-01-08 ENCOUNTER — NON-APPOINTMENT (OUTPATIENT)
Age: 75
End: 2020-01-08

## 2020-01-08 ENCOUNTER — OUTPATIENT (OUTPATIENT)
Dept: OUTPATIENT SERVICES | Facility: HOSPITAL | Age: 75
LOS: 1 days | End: 2020-01-08
Payer: MEDICARE

## 2020-01-08 VITALS
TEMPERATURE: 97.3 F | HEART RATE: 85 BPM | DIASTOLIC BLOOD PRESSURE: 70 MMHG | SYSTOLIC BLOOD PRESSURE: 122 MMHG | HEIGHT: 72 IN | BODY MASS INDEX: 27.5 KG/M2 | WEIGHT: 203 LBS | OXYGEN SATURATION: 97 %

## 2020-01-08 DIAGNOSIS — M71.21 SYNOVIAL CYST OF POPLITEAL SPACE [BAKER], RIGHT KNEE: Chronic | ICD-10-CM

## 2020-01-08 DIAGNOSIS — Z98.89 OTHER SPECIFIED POSTPROCEDURAL STATES: Chronic | ICD-10-CM

## 2020-01-08 DIAGNOSIS — Z01.818 ENCOUNTER FOR OTHER PREPROCEDURAL EXAMINATION: ICD-10-CM

## 2020-01-08 DIAGNOSIS — Z00.00 ENCOUNTER FOR GENERAL ADULT MEDICAL EXAMINATION W/OUT ABNORMAL FINDINGS: ICD-10-CM

## 2020-01-08 PROCEDURE — 99203 OFFICE O/P NEW LOW 30 MIN: CPT | Mod: 25

## 2020-01-08 PROCEDURE — 71046 X-RAY EXAM CHEST 2 VIEWS: CPT

## 2020-01-08 PROCEDURE — 71046 X-RAY EXAM CHEST 2 VIEWS: CPT | Mod: 26

## 2020-01-08 PROCEDURE — 36415 COLL VENOUS BLD VENIPUNCTURE: CPT

## 2020-01-08 PROCEDURE — 93000 ELECTROCARDIOGRAM COMPLETE: CPT

## 2020-01-08 RX ORDER — BUPROPION HYDROCHLORIDE 150 MG/1
150 TABLET, EXTENDED RELEASE ORAL DAILY
Refills: 0 | Status: DISCONTINUED | COMMUNITY
End: 2020-01-08

## 2020-01-08 NOTE — PHYSICAL EXAM
[Well Nourished] : well nourished [No Acute Distress] : no acute distress [Normal Sclera/Conjunctiva] : normal sclera/conjunctiva [Well Developed] : well developed [Well-Appearing] : well-appearing [PERRL] : pupils equal round and reactive to light [EOMI] : extraocular movements intact [Normal Outer Ear/Nose] : the outer ears and nose were normal in appearance [Normal Oropharynx] : the oropharynx was normal [No Lymphadenopathy] : no lymphadenopathy [No JVD] : no jugular venous distention [Supple] : supple [No Respiratory Distress] : no respiratory distress  [Thyroid Normal, No Nodules] : the thyroid was normal and there were no nodules present [No Accessory Muscle Use] : no accessory muscle use [Normal Rate] : normal rate  [Clear to Auscultation] : lungs were clear to auscultation bilaterally [Regular Rhythm] : with a regular rhythm [Normal S1, S2] : normal S1 and S2 [No Murmur] : no murmur heard [No Carotid Bruits] : no carotid bruits [No Abdominal Bruit] : a ~M bruit was not heard ~T in the abdomen [Pedal Pulses Present] : the pedal pulses are present [No Varicosities] : no varicosities [No Palpable Aorta] : no palpable aorta [No Edema] : there was no peripheral edema [No Extremity Clubbing/Cyanosis] : no extremity clubbing/cyanosis [Non Tender] : non-tender [Soft] : abdomen soft [No Masses] : no abdominal mass palpated [Non-distended] : non-distended [Normal Bowel Sounds] : normal bowel sounds [Normal Posterior Cervical Nodes] : no posterior cervical lymphadenopathy [No HSM] : no HSM [No CVA Tenderness] : no CVA  tenderness [Normal Anterior Cervical Nodes] : no anterior cervical lymphadenopathy [No Spinal Tenderness] : no spinal tenderness [No Joint Swelling] : no joint swelling [Grossly Normal Strength/Tone] : grossly normal strength/tone [No Rash] : no rash [Coordination Grossly Intact] : coordination grossly intact [No Focal Deficits] : no focal deficits [Normal Gait] : normal gait [Normal Affect] : the affect was normal [Deep Tendon Reflexes (DTR)] : deep tendon reflexes were 2+ and symmetric [Normal Insight/Judgement] : insight and judgment were intact

## 2020-01-09 ENCOUNTER — TRANSCRIPTION ENCOUNTER (OUTPATIENT)
Age: 75
End: 2020-01-09

## 2020-01-10 LAB
ALBUMIN SERPL ELPH-MCNC: 3.8 G/DL
ALP BLD-CCNC: 88 U/L
ALT SERPL-CCNC: 22 U/L
ANION GAP SERPL CALC-SCNC: 14 MMOL/L
APTT BLD: 35.6 SEC
AST SERPL-CCNC: 20 U/L
BASOPHILS # BLD AUTO: 0.04 K/UL
BASOPHILS NFR BLD AUTO: 0.3 %
BILIRUB SERPL-MCNC: 0.2 MG/DL
BUN SERPL-MCNC: 24 MG/DL
CALCIUM SERPL-MCNC: 9.5 MG/DL
CHLORIDE SERPL-SCNC: 104 MMOL/L
CHOLEST SERPL-MCNC: 111 MG/DL
CHOLEST/HDLC SERPL: 2.7 RATIO
CO2 SERPL-SCNC: 20 MMOL/L
CREAT SERPL-MCNC: 1.17 MG/DL
EOSINOPHIL # BLD AUTO: 0.53 K/UL
EOSINOPHIL NFR BLD AUTO: 3.6 %
ESTIMATED AVERAGE GLUCOSE: 140 MG/DL
GLUCOSE SERPL-MCNC: 118 MG/DL
HBA1C MFR BLD HPLC: 6.5 %
HCT VFR BLD CALC: 39.7 %
HDLC SERPL-MCNC: 41 MG/DL
HGB BLD-MCNC: 12.5 G/DL
IMM GRANULOCYTES NFR BLD AUTO: 0.4 %
INR PPP: 0.91 RATIO
LDLC SERPL CALC-MCNC: 40 MG/DL
LYMPHOCYTES # BLD AUTO: 2.59 K/UL
LYMPHOCYTES NFR BLD AUTO: 17.4 %
MAN DIFF?: NORMAL
MCHC RBC-ENTMCNC: 28.9 PG
MCHC RBC-ENTMCNC: 31.5 GM/DL
MCV RBC AUTO: 91.7 FL
MONOCYTES # BLD AUTO: 1.01 K/UL
MONOCYTES NFR BLD AUTO: 6.8 %
NEUTROPHILS # BLD AUTO: 10.68 K/UL
NEUTROPHILS NFR BLD AUTO: 71.5 %
PLATELET # BLD AUTO: 379 K/UL
POTASSIUM SERPL-SCNC: 4.7 MMOL/L
PROT SERPL-MCNC: 7.1 G/DL
PT BLD: 10.4 SEC
RBC # BLD: 4.33 M/UL
RBC # FLD: 16.3 %
SODIUM SERPL-SCNC: 138 MMOL/L
TRIGL SERPL-MCNC: 150 MG/DL
WBC # FLD AUTO: 14.91 K/UL

## 2020-01-10 NOTE — ASSESSMENT
[Patient Optimized for Surgery] : Patient optimized for surgery [ECG] : ECG [As per surgery] : as per surgery [FreeTextEntry4] : Pre-op medical eval for cerebral angiogram with Dr. Soler\par \par - Required labs and EKG today which showed NSR\par - CXR clear\par - Patient with history of HARISH which he is noncompliant with as he does not wear CPAP device despite a diagnosis of moderate sleep disordered breathing. Patient will require CPAP/BIPAP post-anesthesia and close monitoring of airways before, during and after surgery. \par - No contraindication for surgery. The medical condition is optimized for surgery. There is no evidence neither of angina, CHF, arrhythmias, nor valvular disease. There is no limitation of exercise capacity. LANRE EDMOND is to be extubated once fully awake and able to protect airway. he is to be monitored in the recovery room.  They might benefit for high flow oxygen or noninvasive ventilation to prevent or reverse atelectasis.  Avoid oversedation. Patient is high risk for DVT and will require bimodal agents for DVT prophylaxis early mobilization is recommended. Patient is to use the incentive spirometry postoperative.  The patient was given instructions regarding the preoperative preparation. I instructed the patient to notify me if there is any change in the clinical status prior the surgery including any skin manifestations. No aspirin or NSAIDs, Naproxen, MURILLO inhibitors, herbs, green tea and vitamins prior to surgery. NPO after midnight prior to surg. \par - BP well controlled. Patient constructed to take BP medications morning of procedure. Patient to hold Metformin morning of procedure due to NPO status. Pt will have to stop plavix and asprin prior to angiogram.\par \par - EULALIO performed in hospital 11/2019 concerning for severe none-mobile plaque noted on aorta. echo 11/2019 CONCLUSIONS: \par \par  1. Normal left and right ventricular size and systolic function. LVEF \par  65%. Moderate LVH. \par  2. Trace aortic regurgitation. \par  3. No pericardial effusion.

## 2020-01-10 NOTE — RESULTS/DATA
[] : results reviewed [NSR] : normal sinus rhythm [ECG Reviewed] : reviewed [Normal] : The 12 - lead ECG is normal [de-identified] : clear  [de-identified] : WBC elevated since 2016

## 2020-01-10 NOTE — HISTORY OF PRESENT ILLNESS
[No Pertinent Cardiac History] : no history of aortic stenosis, atrial fibrillation, coronary artery disease, recent myocardial infarction, or implantable device/pacemaker [No Pertinent Pulmonary History] : no history of asthma, COPD, sleep apnea, or smoking [Sleep Apnea] : sleep apnea [Smoker] : smoker [(Patient denies any chest pain, claudication, dyspnea on exertion, orthopnea, palpitations or syncope)] : Patient denies any chest pain, claudication, dyspnea on exertion, orthopnea, palpitations or syncope [No Adverse Anesthesia Reaction] : no adverse anesthesia reaction in self or family member [Diabetes] : diabetes [Aortic Stenosis] : no aortic stenosis [Atrial Fibrillation] : no atrial fibrillation [Implantable Device/Pacemaker] : no implantable device/pacemaker [Coronary Artery Disease] : no coronary artery disease [Recent Myocardial Infarction] : no recent myocardial infarction [Asthma] : no asthma [COPD] : no COPD [Family Member] : no family member with adverse anesthesia reaction/sudden death [Chronic Kidney Disease] : no chronic kidney disease [Chronic Anticoagulation] : no chronic anticoagulation [Self] : no previous adverse anesthesia reaction [FreeTextEntry2] : TBD [FreeTextEntry3] : Dr. Soler [FreeTextEntry1] : cerebral angiogram [FreeTextEntry4] : 74 year old male with PMHx of cerebellar stroke (on ASA & plavix; no residual weakness), diabetes,  HTN, HARISH (not actively using CPAP; no snoring) , depression & herniated disks presents today for medical clearance for cerebral angiogram to assess vertebra arteries. \par \par Former smoker- smoked 8 yrs in the '70s 4 packs per day then quit cold turkey. Not using marijuana or other recreational drugs. \par \par Was told he has "scarring on the lungs"- from 9/11; had a work meeting by 9/11 & was exposed to toxic dusts/fumes.\par \par Not coughing or wheezing. No shortness of breath at rest or exertion.No chest pain or palpitations. \par \par Echo done 11/2019 revealed \par \par

## 2020-01-10 NOTE — REVIEW OF SYSTEMS
[Negative] : Heme/Lymph [Back Pain] : back pain [Shortness Of Breath] : no shortness of breath [Wheezing] : no wheezing [Cough] : no cough

## 2020-01-10 NOTE — PLAN
[FreeTextEntry1] : \par \par \par EXAM: ECHOCARDIOGRAM (CARDIOL) \par \par PROCEDURE DATE: 2019 \par \par \par \par INTERPRETATION: REPORT: \par ----------------------------------- \par TRANSTHORACIC ECHOCARDIOGRAM REPORT \par \par \par ------------------------------------------------------------------------------ \par -- \par Patient Name: LANRE EDMOND Date of Exam: 2019 \par Medical Rec #: [id number] Height: 70.9 in \par Account #: [id number] Weight: 220.5 lb \par YOB: 1945 BSA: 2.20 mï¿½ \par Patient Age: 74 years BP: 158/82 mmHg \par Patient Gender: M Sonographer: RAYMON DC \par  Refering Physician: ALVA HOWE \par \par \par CPT: ECHO W BUBBLE AND DOPPLER COMP - 02842; - [id number].m; ECHO TTE \par WO \par  CON COMP - 42544; - [id number].m \par Indication(s): I69.998 - cva \par Procedure: A complete two-dimensional transthoracic echocardiogram was \par  performed: 2D, M-mode, spectral and color flow Doppler. \par \par \par \par ------------------------------------------------------------------------------ \par -- \par CONCLUSIONS: \par \par  1. Normal left and right ventricular size and systolic function. LVEF \par  65%. Moderate LVH. \par  2. Trace aortic regurgitation. \par  3. No pericardial effusion. \par \par ------------------------------------------------------------------------------ \par -- \par 2D AND M-MODE MEASUREMENTS (normal ranges within parentheses): \par \par Left Ventricle: Normal \par IVSd (2D): 1.35 cm (0.7-1.1) \par LVPWd (2D): 1.27 cm (0.7-1.1) \par LVIDd (2D): 4.76 cm (3.4-5.7) \par LVIDs (2D): 2.92 cm \par LV FS (2D): 38.7 % (>25%) \par LV EF (MOD BP): 66 % (>55%) \par \par LV DIASTOLIC FUNCTION: \par \par MV Peak E: 75.50 cm/s MV e' lat: 7.8 cm/s MV E/e' lat ratio: 9.7 \par MV Peak A: 94.30 cm/s MV e' med: 5.6 cm/s MV E/e' med ratio: 13.5 \par E/A Ratio: 0.80 MV e' av.7 cm/s \par \par SPECTRAL DOPPLER ANALYSIS: \par \par Aortic Valve: AoV Max Jesus: AoV Peak PG: AoV Mean PG: \par  7.0 mmHg \par LVOT Vmax: 0.74 m/s LVOT VTI: 0.165 m LVOT Diameter: 2.30 cm \par AoV Area, VMax: AoV Area, VTI: 1.69 cmï¿½ AoV Area, Vmn: 1.74 cmï¿½ \par \par Tricuspid Valve and PA/RV Systolic Pressure: TR Max Velocity: RA Pressure: 3 \par mmHg RVSP/PASP: \par \par \par ------------------------------------------------------------------------------ \par -- \par FINDINGS: \par \par Left Ventricle: \par The left ventricle is normal in size and systolic function with a calculated \par ejection fraction of 65.0 %. There are no regional wall motion abnormalities \par seen. There is moderate symmetric left ventricular hypertrophy. \par \par Right Ventricle: \par The right ventricle is normal in size and systolic function. \par \par Left Atrium: \par The left atrium is normal in size. \par \par Right Atrium: \par The right atrium is normal in size. \par \par Aortic Valve: \par Fibrocalcific tricuspid aortic valve without significant stenosis. There is \par trace valvular aortic regurgitation. \par \par Pulmonic Valve: \par Structurally normal pulmonic valve with normal leaflet excursion. There is \par no evidence of pulmonic regurgitation. \par \par Mitral Valve: \par Structurally normal mitral valve with normal leaflet excursion. The mitral \par valve is structurally normal without significant stenosis. There is no \par evidence of mitral regurgitation. \par \par Tricuspid Valve: \par Structurally normal tricuspid valve with normal leaflet excursion. There is \par no evidence of tricuspid regurgitation. There was insufficient tricuspid \par regurgitation detected from which to calculate pulmonary artery systolic \par pressure. \par \par Venous: \par The inferior vena cava is normal in size (<2.1cm) with normal inspiratory \par collapse (>50%) consistent with normal right atrial pressure ( \par 3, range 0-5mmHg). \par \par Pericardium: \par The pericardium is normal in appearance and thickness without significant \par pericardial effusion. \par \par Dr. Dior Arriola MD. \par \par Electronically signed by Dr. Dior Arriola MD. \par Signature Date/Time: 2019/5:19:32 PM \par \par \par \par *** Final *** \par \par \par \par \par \par \par \par \par "Thank you for the opportunity to participate in the care of this patient." \par \par \par \par DIOR ARRIOLA \par This document has been electronically signed. 2019 4:34PM \par \par \par \par \par \par \par EXAM: ESOPHAGEAL ECHO (CARDIOL) \par \par PROCEDURE DATE: 2019 \par \par \par \par INTERPRETATION: REPORT: \par ------------------------------------- \par TRANSESOPHAGEAL ECHOCARDIOGRAM REPORT \par \par \par ------------------------------------------------------------------------------ \par -- \par Patient Name: LANRE EDMOND Date of Exam: 2019 \par Medical Rec #: [id number] Height: 71.0 in \par Account #: [id number] Weight: 220.0 lb \par YOB: 1945 BSA: 2.20 mï¿½ \par Patient Age: 74 years BP: 175/81 mmHg \par Patient Gender: M Sonographer: [id number] Francoise Ivey MD \par  Refering Physician: ELISA \par \par \par CPT: ECHO TRANSESOPH - 69301; - [id number].m \par Indication(s): I67.89 - Other cerebrovascular disease \par Procedure: A complete two-dimensional transesophageal echocardiogram \par  was performed: 2D, spectral and color flow Doppler. Real \par  time and full volume 3-dimensional imaging performed. The \par  patient was brought to the echocardiographic laboratory \par in a \par  fasting state. Informed consent for Transesophageal \par  Echocardiogram, and use of a contrast agent as needed, \par was \par  obtained prior to procedure. The transesophageal probe \par was \par  passed without difficulty. The patient's vitals signs, \par  including blood pressure, heart rate, pulse oximetry and \par  cardiac rhythm were monitored thoughout the procedure and \par  remained stable. \par \par Study Quality: Good. \par Contrast Injection: Contrast injection with agitated saline was performed. \par Conscious Sedation: IV conscious sedation was administered using 4mg of \par Versed \par  and 100mcg of Fentanyl. \par Study Comments: The patient tolerated the procedure well without \par evidence of \par  orophangeal or esophageal trauma. There were no \par  complications. \par \par \par ------------------------------------------------------------------------------ \par -- \par CONCLUSIONS: \par \par  1. Normal left and right ventricular size and systolic function. \par  2. No significant valvular disease. \par  3. No LA / JOANNA / RA / RAA thrombus seen. \par  4. No evidence of intra-cardiac shunting. \par  5. There is severe non-mobile plaque in the visualized portion of the \par descending aorta. There is moderate non-mobile plaque in the visualized \par portion of the aortic arch. \par  6. No pericardial effusion. \par \par ------------------------------------------------------------------------------ \par -- \par \par ------------------------------------------------------------------------------ \par -- \par FINDINGS: \par \par Left Ventricle: \par The left ventricle is normal in size and systolic function with a calculated \par ejection fraction of 60.0 %. \par \par Right Ventricle: \par The right ventricle is normal in size and systolic function. \par \par Left Atrium: \par No thrombus seen in the left atrium or in the left atrial appendage. \par Spectral Doppler reveals normal left atrial appendage velocities. \par \par Right Atrium: \par No thrombus seen in the right atrium or right atrial appendage. \par \par Aortic Valve: \par Mildly thickened trileaflet aortic valve. Trace aortic regurgitation. \par \par Pulmonic Valve: \par Structurally normal pulmonic valve with normal leaflet excursion. There is \par mild pulmonic regurgitation. \par \par Mitral Valve: \par Structurally normal mitral valve with normal leaflet excursion. There is \par trace mitral regurgitation. \par \par Tricuspid Valve: \par Structurally normal tricuspid valve with normal leaflet excursion. There is \par trace tricuspid regurgitation. \par \par Aorta: \par There is severe non-mobile plaque in the visualized portion of the \par descending aorta. There is moderate non-mobile plaque in the visualized \par portion of the aortic arch. \par \par Venous: \par Injection of agitated saline via a peripheral vein reveals no evidence of a \par right-to-left shunt. \par \par Pericardium: \par No pericardial effusion is seen. \par \par Dr. Francoise Ivey MD. \par \par Electronically signed by Dr. Francoise Ivey MD. \par Signature Date/Time: 2019/11:26:01 AM \par \par \par \par *** Final *** \par \par \par \par \par \par \par \par \par "Thank you for the opportunity to participate in the care of this patient." \par \par \par \par FRANCOISE IVEY M.D., ATTENDING CARDIOLOGIST \par This document has been electronically signed. 2019 10:30AM \par \par \par \par \par \par \par \par

## 2020-01-13 ENCOUNTER — OTHER (OUTPATIENT)
Age: 75
End: 2020-01-13

## 2020-01-15 NOTE — ADDENDUM
[FreeTextEntry1] : January 2, 2020\par \par Jameel Us MD\par [address]\[place] [address]\par \par Re:	Demetrio Gasca \par \par Dear Victoriano:\par \par I saw Demetrio in the office today.  As you know, he is a 74-year-old male with a history of hypertension, anxiety, arthritis, chronic bronchitis, noninsulin-dependent diabetes, status post a left PICA stroke, who presents with narrowing of both vertebral arteries and evidence of a posterior circulation flow decrement.\par \par Mr. Gasca reports that his early symptoms, which were characterized primarily by some visual disturbance and numbness in his right hand, have completely resolved.  He reports that his gait is really back to where it was before.  He even reports that his back pain is improved.  He was diagnosed with a left PICA stroke during his workup with a CTA showing diminutive bilateral vertebral arteries in the neural foramen although with an apparent robust basilar artery.  The MR NOVA, however, shows significant flow decline in the basilar and left PCA, and he now comes for neurosurgical evaluation.\par \par Mr. Gasca really has no neurological deficit at this point.  He has had a prior gallbladder resection, a hernia repair, and a laminectomy in the past.  He does not drink and stopped smoking in 1977.  He works part time as a .  His current medications include metformin, losartan, meloxicam, zolpidem, Plavix, aspirin, Cosentyx, vitamin D, a multivitamin, amlodipine, and Zantac.  He additionally is on a statin.  He has no real neurological complaints other than some abnormal sensation and tingling and denies any neurological weakness.  He does have some dry eye, some bilateral hearing loss, and joint stiffness.  As I said, he is intact neurologically.\par \par I had the opportunity to review Demetrio’s MRI as well as MRA and CTA.  The CTA shows very significant narrowing of both vertebral arteries with the left vertebral emanating from the aortic arch.  There does appear to be flow-limiting stenosis of both vertebral arteries potentially.  However, the basilar is widely patent.  The right PCA fills from the right PCOM.  The MRA and MRI do show a left PICA stroke that is within the left cerebellar hemisphere with flows that are quite low with less than 50 mL in both vertebrals as well as 22 mL in the basilar, which is quite low even in spite of a posterior cerebral artery coming from the right PCOM.\par \par I do think a baseline angiogram makes sense so that we understand Demetrio’s vessels as well as possible.  Once we have obtained the angiogram, I think it is likely we will maintain him on medical therapy for the time being and told him so.  I certainly do not see an absolute surgical indication here.\par \par I will certainly keep you abreast of his progress and appreciate your kind referral.\par \par Sincerely,\par \par \par \par Randy Soler MD\par \par DL/ag [facility] #[id number]\par \par CC:	Bruce Clay M.D.\par 	[address] [facility]\[place] [address]\par 	\par 	Tr Bello MD\par 	[address]\par 	[address] Floor\[place] [address]\par

## 2020-01-15 NOTE — HISTORY OF PRESENT ILLNESS
[de-identified] : 73yo right handed male \par History of Present Illness\par System atrial child with a history of a cerebrovascular accident\par \par He is left with weakness involving left side of his body unsteadiness no specific difficulties involving language remote and some dysarthric speech. He has a history of spinal stenosis is not currently receiving physical therapy is on Plavix and aspirin I have seen multiple radiological studies including those from his hospitalization. \par \par \par

## 2020-01-15 NOTE — PHYSICAL EXAM
[General Appearance - Alert] : alert [General Appearance - In No Acute Distress] : in no acute distress [Oriented To Time, Place, And Person] : oriented to person, place, and time [Impaired Insight] : insight and judgment were intact [Affect] : the affect was normal [Person] : oriented to person [Place] : oriented to place [Time] : oriented to time [Short Term Intact] : short term memory intact [Remote Intact] : remote memory intact [Concentration Intact] : normal concentrating ability [Span Intact] : the attention span was normal [Comprehension] : comprehension intact [Fluency] : fluency intact [Current Events] : adequate knowledge of current events [Vocabulary] : adequate range of vocabulary [Past History] : adequate knowledge of personal past history [Cranial Nerves Optic (II)] : visual acuity intact bilaterally,  pupils equal round and reactive to light [Cranial Nerves Oculomotor (III)] : extraocular motion intact [Cranial Nerves Facial (VII)] : face symmetrical [Cranial Nerves Trigeminal (V)] : facial sensation intact symmetrically [Cranial Nerves Vestibulocochlear (VIII)] : hearing was intact bilaterally [Cranial Nerves Glossopharyngeal (IX)] : tongue and palate midline [Cranial Nerves Hypoglossal (XII)] : there was no tongue deviation with protrusion [Cranial Nerves Accessory (XI - Cranial And Spinal)] : head turning and shoulder shrug symmetric [No Muscle Atrophy] : normal bulk in all four extremities [Sensation Tactile Decrease] : light touch was intact [Motor Strength] : muscle strength was normal in all four extremities [Balance] : balance was intact [2+] : Patella left 2+ [No Visual Abnormalities] : no visible abnormailities [No Tenderness to Palpation] : no spine tenderness on palpation [Full ROM] : full ROM [No Pain with ROM] : no pain with motion in any direction [Normal] : normal [Intact] : all sensory within normal limits bilaterally [PERRL With Normal Accommodation] : pupils were equal in size, round, reactive to light, with normal accommodation [Sclera] : the sclera and conjunctiva were normal [Extraocular Movements] : extraocular movements were intact [Outer Ear] : the ears and nose were normal in appearance [Neck Appearance] : the appearance of the neck was normal [Oropharynx] : the oropharynx was normal [Neck Cervical Mass (___cm)] : no neck mass was observed [Jugular Venous Distention Increased] : there was no jugular-venous distention [Thyroid Diffuse Enlargement] : the thyroid was not enlarged [Thyroid Nodule] : there were no palpable thyroid nodules [Auscultation Breath Sounds / Voice Sounds] : lungs were clear to auscultation bilaterally [Bowel Sounds] : normal bowel sounds [Abdomen Soft] : soft [Abdomen Tenderness] : non-tender [] : no hepato-splenomegaly [No CVA Tenderness] : no ~M costovertebral angle tenderness [Abdomen Mass (___ Cm)] : no abdominal mass palpated [No Spinal Tenderness] : no spinal tenderness [Abnormal Walk] : normal gait [Nail Clubbing] : no clubbing  or cyanosis of the fingernails [Musculoskeletal - Swelling] : no joint swelling seen [Motor Tone] : muscle strength and tone were normal [Past-pointing] : there was no past-pointing [Tremor] : no tremor present [L'Hermitte's] : neck flexion did not produce tingling down the spine/arms [Spurling's Same Side] : Negative Spurling's on same side [Spurling's - Opposite Side] : Negative Spurling's on opposite side [Straight-Leg Raise Test - Left] : straight leg raise of the left leg was negative [Straight-Leg Raise Test - Right] : straight leg raise  of the right leg was negative

## 2020-01-15 NOTE — PLAN
[FreeTextEntry1] : Dr Soler review the images and case with patient and family  Advised to continue ASA Plavix and a statin\par In 6 months MRI Nova\par Would plan  for cerebral angiogram for baseline \par Preop Clearance Packet given to pt

## 2020-01-22 ENCOUNTER — OTHER (OUTPATIENT)
Age: 75
End: 2020-01-22

## 2020-02-07 ENCOUNTER — OUTPATIENT (OUTPATIENT)
Dept: OUTPATIENT SERVICES | Facility: HOSPITAL | Age: 75
LOS: 1 days | Discharge: NURSING, REHAB OR LONG-TERM CARE | End: 2020-02-07
Payer: MEDICARE

## 2020-02-07 DIAGNOSIS — M71.21 SYNOVIAL CYST OF POPLITEAL SPACE [BAKER], RIGHT KNEE: Chronic | ICD-10-CM

## 2020-02-07 DIAGNOSIS — I63.9 CEREBRAL INFARCTION, UNSPECIFIED: ICD-10-CM

## 2020-02-07 DIAGNOSIS — Z98.89 OTHER SPECIFIED POSTPROCEDURAL STATES: Chronic | ICD-10-CM

## 2020-02-07 LAB
GLUCOSE BLDC GLUCOMTR-MCNC: 110 MG/DL — HIGH (ref 70–99)
GLUCOSE BLDC GLUCOMTR-MCNC: 117 MG/DL — HIGH (ref 70–99)

## 2020-02-07 PROCEDURE — 36226 PLACE CATH VERTEBRAL ART: CPT | Mod: LT

## 2020-02-07 PROCEDURE — 36223 PLACE CATH CAROTID/INOM ART: CPT | Mod: 50

## 2020-02-07 PROCEDURE — 36225 PLACE CATH SUBCLAVIAN ART: CPT | Mod: 50,59

## 2020-02-07 RX ORDER — ACETAMINOPHEN 500 MG
650 TABLET ORAL ONCE
Refills: 0 | Status: COMPLETED | OUTPATIENT
Start: 2020-02-07 | End: 2020-02-07

## 2020-02-07 RX ORDER — SODIUM CHLORIDE 9 MG/ML
1000 INJECTION INTRAMUSCULAR; INTRAVENOUS; SUBCUTANEOUS
Refills: 0 | Status: DISCONTINUED | OUTPATIENT
Start: 2020-02-07 | End: 2020-02-07

## 2020-02-07 RX ORDER — CHLORHEXIDINE GLUCONATE 213 G/1000ML
1 SOLUTION TOPICAL ONCE
Refills: 0 | Status: DISCONTINUED | OUTPATIENT
Start: 2020-02-07 | End: 2020-02-07

## 2020-02-07 RX ADMIN — Medication 650 MILLIGRAM(S): at 12:38

## 2020-02-07 RX ADMIN — SODIUM CHLORIDE 100 MILLILITER(S): 9 INJECTION INTRAMUSCULAR; INTRAVENOUS; SUBCUTANEOUS at 12:39

## 2020-02-07 NOTE — BRIEF OPERATIVE NOTE - NSICDXBRIEFPREOP_GEN_ALL_CORE_FT
"Chief Complaint   Patient presents with     Prenatal Care     33 weeks 6days. pt was in hospital yesterday 8/7 for spotting and high BP       Initial /70  Pulse 80  Wt 287 lb 3.2 oz (130.3 kg)  LMP 12/14/2016 (Approximate)  BMI 46.36 kg/m2 Estimated body mass index is 46.36 kg/(m^2) as calculated from the following:    Height as of 7/30/17: 5' 6\" (1.676 m).    Weight as of this encounter: 287 lb 3.2 oz (130.3 kg).  Medication Reconciliation: complete   SMA Sammi      " PRE-OP DIAGNOSIS:  Cerebrovascular accident (CVA) 07-Feb-2020 12:38:24  Adebayo Gil

## 2020-02-07 NOTE — PROGRESS NOTE ADULT - PROBLEM SELECTOR PLAN 1
Consent for angiogram in chart, all R/B/A discussed,  Outpatient medical clearance reviewed,  Anticipate DC home after period of bedrest,  D/w Dr. Soler

## 2020-02-07 NOTE — BRIEF OPERATIVE NOTE - OPERATION/FINDINGS
Femoral cerebral angiogram performed under MAC sedation via right CFA using a 5Fr short sheath. Three vessel angiogram performed. Left CCA injection demonstrates likely atherosclerotic disease in the inferior distal MCA (M2/M3) which is moderate. There is cross filling into the right LINDA. Absent PCOM. Right CCA injection with evidence right PCOM, b/l PCAs filling with posterior fossa circulation. Absent right vertebral artery on subclavian injection with reconstitution of flow into the vertebrobasilar circulation through collaterals. Left subclavian injection with very small caliber vertebral artery with distal occlusion. Right groin was perclosed with hemostasis obtained. Patient remains hemodynamically and neurologically stable throughout the procedure though with significant movement making a challenging procedure.    Full report to follow, d/w Dr. Soler.

## 2020-02-07 NOTE — PROGRESS NOTE ADULT - ASSESSMENT
75 year old male with HTN, DM II, HARISH, h/o CVA 75 year old male with HTN, DM II, HARISH, h/o CVA, Arthritis presenting for diagnostic cerebral angiogram

## 2020-02-07 NOTE — PROGRESS NOTE ADULT - SUBJECTIVE AND OBJECTIVE BOX
Neuroendovascular Pre-Op Note    HPI: 75 year old male with HTN, DM II, HARISH not on CPAP, Depression, h/o cerebellar CVA without residual on ASA/Plavix        PMH: As above  PSH: h/o cholecystectomy, h/o umbilical hernia repair, h/o lumbar discectomy  Allergies: NKDA  SocHx: Quit smoking, denies ETOH or illicit drug abuse    Medications: Norvasc 5mg daily, Losartan 100mg daily, Buproprion XL 150mg daily, Effexor 75mg daily, Folic Acid 1mg daily, Ativan 1mg twice daily, Metformin, Plavix 75mg daily, ASA    Exam:  Gen: NAD, AAOx3  HEENT: PERRL. EOMI. VF intact. NC/AT.  Neck: FROM, nontender  Lungs: Clear b/l  Heart: S1, S2. NSR.  Abd: Soft, NT/ND. +BS  Exts: Pulses 2+ throughout  Neuro: CNs II-XII intact. 5/5 str x4 extremities. Sensation to LT intact. Following commands. Speech clear. Gait WNL. Neuroendovascular Pre-Op Note    HPI: 75 year old male with HTN, DM II, HARISH, Depression/Anxiety, h/o left cerebellar CVA 11/2019 without residual symptoms on ASA/Plavix presents today for diagnostic cerebral angiogram for further work-up for a cause of the stroke. Patient denies any symptoms related to the stroke but does report chronic back and right hip pain for which he takes pain medication. He has been compliant with his Aspirin and Plavix.      PMH: As above, Psoriatic Arthritis, Lumbago  PSH: h/o cholecystectomy, h/o umbilical hernia repair, h/o lumbar discectomy  Allergies: NKDA  SocHx: Quit smoking, denies ETOH or illicit drug abuse    Medications: Norvasc 5mg daily, Losartan 100mg daily, Effexor 75mg daily, Folic Acid 1mg daily, Ativan 1mg twice daily, Metformin 750mg BID, Plavix 75mg daily, ASA 81mg daily, Meloxicam PRN pain, Lipitor 40mg, Ambien 5mg bedtime, Cosentyx 300mg SQ    Exam:  Gen: NAD, AAOx3  HEENT: PERRL. EOMI. VF intact. NC/AT.  Neck: FROM, nontender  Lungs: Clear b/l  Heart: S1, S2. NSR.  Abd: Soft, NT/ND. +BS  Exts: Pulses 2+ throughout  Neuro: CNs II-XII intact. 5/5 str x4 extremities. Sensation to LT intact. Following commands. Speech clear. Fine motor intact.

## 2020-02-08 PROCEDURE — 36225 PLACE CATH SUBCLAVIAN ART: CPT | Mod: RT

## 2020-02-08 PROCEDURE — 82962 GLUCOSE BLOOD TEST: CPT

## 2020-02-08 PROCEDURE — C1769: CPT

## 2020-02-08 PROCEDURE — 36226 PLACE CATH VERTEBRAL ART: CPT | Mod: LT

## 2020-02-08 PROCEDURE — C1894: CPT

## 2020-02-08 PROCEDURE — 36223 PLACE CATH CAROTID/INOM ART: CPT | Mod: 50

## 2020-02-08 PROCEDURE — C1725: CPT

## 2020-02-08 PROCEDURE — C1760: CPT

## 2020-02-13 ENCOUNTER — APPOINTMENT (OUTPATIENT)
Dept: NEUROSURGERY | Facility: CLINIC | Age: 75
End: 2020-02-13
Payer: MEDICARE

## 2020-02-13 VITALS
HEART RATE: 102 BPM | HEIGHT: 72 IN | SYSTOLIC BLOOD PRESSURE: 111 MMHG | BODY MASS INDEX: 27.5 KG/M2 | RESPIRATION RATE: 18 BRPM | OXYGEN SATURATION: 97 % | WEIGHT: 203 LBS | DIASTOLIC BLOOD PRESSURE: 70 MMHG

## 2020-02-13 DIAGNOSIS — R93.89 ABNORMAL FINDINGS ON DIAGNOSTIC IMAGING OF OTHER SPECIFIED BODY STRUCTURES: ICD-10-CM

## 2020-02-13 PROCEDURE — 99215 OFFICE O/P EST HI 40 MIN: CPT

## 2020-02-18 PROBLEM — R93.89 ABNORMAL ANGIOGRAM: Status: ACTIVE | Noted: 2020-02-18

## 2020-02-19 NOTE — ASSESSMENT
[FreeTextEntry1] : Angiogram reviewed with pt and 2 sons by phone.Bilateral vertebrals are suppling no flow at all but flow is coming from a collateral vessel.   Pt needs to be on antiplatelet and statin which is the best treatment.  Would only consider sx if he was having stroke symptoms. \par \par \par \par Plan:\par 1- continue medical therapy with statins, DAPT\par 2- family can decide regarding driving since no deficit noted here today\par 3- follow up in 6 months\par 4- SIgns and symptoms of stroke reviewed with patient

## 2020-02-19 NOTE — HISTORY OF PRESENT ILLNESS
[de-identified] : Today  is here for f/u s/p DCA to discuss results. Pt reports that he has not experienced any neurological deficits he denied dizziness, weakness, numbness\par or visual changes. Pt reports that he has continued the Plavix and ASA regimen as well as a statin. \par \par Appointment January 2, 2020\par 75yo right handed male \par History of Present Illness\par  s/p PICA stroke, right vertebral artery stenosis\par \par He is left with weakness involving left side of his body unsteadiness no specific difficulties involving language remote and some dysarthric speech. He has a history of spinal stenosis is not currently receiving physical therapy is on Plavix and aspirin I have seen multiple radiological studies including those from his hospitalization. \par \par \par

## 2020-02-19 NOTE — ADDENDUM
[FreeTextEntry1] : Jameel Us MD\par [address]\[place] [address]\par \par Re:	Demetrio Gasca \par \par Dear Victoriano:\par \par I saw Demetrio in the office in followup.  He underwent a diagnostic angiogram last week which shows evidence of functional occlusions in the bilateral vertebral arteries.  As you know, his NOVA test shows preserved flow in the bilateral posterior cerebrals from a large right PCOM.  I told Demetrio that his best treatment option for now is to treat him medically with aspirin and Plavix and a statin as well as firm blood pressure control.  The risk of any treatment is much higher than the risk of medical therapy.  However, I told Deniz that if he develops any bulbar symptomatology such as dizziness, nausea, difficulty with swallowing, gait ataxia, or any weakness or numbness, to call right away, as it could be a stroke equivalent.  \par \par Overall, I am very optimistic of his good outcome and will certainly keep you abreast of his progress.\par \par Sincerely,\par \par \par \par Randy Soler MD\par \par DL/ag [facility] #[id number]\par \par

## 2020-02-19 NOTE — PHYSICAL EXAM
[General Appearance - Alert] : alert [General Appearance - In No Acute Distress] : in no acute distress [General Appearance - Well Nourished] : well nourished [General Appearance - Well Developed] : well developed [General Appearance - Well-Appearing] : healthy appearing [Oriented To Time, Place, And Person] : oriented to person, place, and time [Impaired Insight] : insight and judgment were intact [Affect] : the affect was normal [Mood] : the mood was normal [Memory Recent] : recent memory was not impaired [Memory Remote] : remote memory was not impaired [Motor Tone] : muscle tone was normal in all four extremities [Balance] : balance was intact [Sclera] : the sclera and conjunctiva were normal [Outer Ear] : the ears and nose were normal in appearance [] : no respiratory distress [Neck Appearance] : the appearance of the neck was normal [Abnormal Walk] : normal gait [Skin Color & Pigmentation] : normal skin color and pigmentation

## 2020-06-15 ENCOUNTER — APPOINTMENT (OUTPATIENT)
Dept: HEART AND VASCULAR | Facility: CLINIC | Age: 75
End: 2020-06-15

## 2020-06-16 ENCOUNTER — NON-APPOINTMENT (OUTPATIENT)
Age: 75
End: 2020-06-16

## 2020-06-16 ENCOUNTER — APPOINTMENT (OUTPATIENT)
Dept: HEART AND VASCULAR | Facility: CLINIC | Age: 75
End: 2020-06-16
Payer: MEDICARE

## 2020-06-16 VITALS
HEART RATE: 81 BPM | OXYGEN SATURATION: 97 % | WEIGHT: 209.56 LBS | TEMPERATURE: 97.8 F | BODY MASS INDEX: 28.38 KG/M2 | SYSTOLIC BLOOD PRESSURE: 149 MMHG | DIASTOLIC BLOOD PRESSURE: 79 MMHG | HEIGHT: 72 IN

## 2020-06-16 DIAGNOSIS — R60.0 LOCALIZED EDEMA: ICD-10-CM

## 2020-06-16 PROCEDURE — 93306 TTE W/DOPPLER COMPLETE: CPT

## 2020-06-16 PROCEDURE — 99204 OFFICE O/P NEW MOD 45 MIN: CPT

## 2020-06-16 PROCEDURE — 93000 ELECTROCARDIOGRAM COMPLETE: CPT

## 2020-06-16 NOTE — REASON FOR VISIT
[FreeTextEntry1] : 75 year-old male w/ PMH of HTN, HARISH (not using CPAP), psoriatic arthritis, s/p PICA stroke in 11/2019, right vertebral artery stenosis and s/p DCA (01/25/2020) followed by Dr. Soler presents today as a referral from Dr. Us for LE edema. \par

## 2020-06-16 NOTE — DISCUSSION/SUMMARY
[FreeTextEntry1] : 75 year-old male w/ PMH of HTN, HARISH (not using CPAP), psoriatic arthritis, s/p PICA stroke in 11/2019, right vertebral artery stenosis and s/p DCA (01/25/2020) followed by Dr. Soler presents today as a referral from Dr. Us for LE edema and for evaluation prior to right hip surgery as per his report. \par \par His pedal edema may be multi-factorial including some degree of diastolic HF in the setting of moderate LVH and some contribution from norvasc. Will change norvasc to HCTZ 25 mg daily (which was already prescribed for him) and if still not well controlled, will convert to chlorthalidone which is more potent. Since he has reported low blood pressures as well, will refrain from starting aggressive regimen immediately, but he is hypertensive today in our office. \par \par His ASCVD risk factors are fairly well controlled with metformin at this time and max dose statin. He could benefit from some degree of lifestyle change for with weight loss, but will benefit from hip surgery to become more active. \par \par In light of his severe arch plaque and inability exercise as well as reluctance to have a nuclear stress test, will perform a CTA to assess coronaries prior to surgery with the use of FFR is needed. \par \par Instructed to monitor blood pressures at home and to notify us with symptoms. Repeat labs for CMP. \par \par Return in 2 months. \par \par \par

## 2020-06-16 NOTE — HISTORY OF PRESENT ILLNESS
[FreeTextEntry1] : 75 year-old male w/ PMH of HTN, HARISH (not using CPAP because he has lost weight and feels improved), psoriatic arthritis, s/p PICA stroke in 11/2019, right vertebral artery stenosis and s/p DCA (01/25/2020), severe aortic arch plaque, moderate LVH, followed by Dr. Soler presents today as a referral from Dr. Us for LE edema. \par \par His son is a podiatric surgeon. \par \par He is concerned about his right hip and does not want to break it, so he wants surgery ASAP. He is currently ambulating with a cane out of concern for the hip, not deficits from the stroke. He denies any neurological deficits he denied dizziness, weakness, numbness.\par \par He had back surgery 5.5 years ago and reports needing painkillers for years after. He says he is not taking anything for his back now and it feels fine. He reports a previous surgery on his feet causes him pain, so he does not feel he is limited with walking for reasons other than his hip and feet. He can walk about 3 blocks max before the foot and/or hip pain makes him stop. He denies any chest pain, SOB, palpitations or any concerns with his CV exercise capacity.\par \par He is c/o b/l LE edema and needs a cardiac clearance before having hip surgery.\par \par He reports his A1c is now 6.1% (was as bad as 10% in the past)\par \par He denies drinking any alcohol and reports his diet is good and well-balanced. Started smoking at age 20-24 quit; then restarted a few years later, and finally stopped for good at 34 years old.\par \par He reports his HARISH was classified as severe at one point, but he was never happy w/ the CPAP and reports that after losing 50 lbs he no longer has issues and sleeps well with no more snoring after losing the weight. \par \par EULALIO 11/2019 @ [facility]:\par \par CONCLUSIONS:\par \par  1. Normal left and right ventricular size and systolic function.\par  2. No significant valvular disease.\par  3. No LA / JOANNA / RA / RAA thrombus seen.\par  4. No evidence of intra-cardiac shunting.\par  5. There is severe non-mobile plaque in the visualized portion of the \par descending aorta. There is moderate non-mobile plaque in the visualized \par portion of the aortic arch.\par  6. No pericardial effusion.\par

## 2020-06-16 NOTE — PHYSICAL EXAM
[General Appearance - Well Developed] : well developed [Normal Appearance] : normal appearance [Well Groomed] : well groomed [General Appearance - Well Nourished] : well nourished [Normal Conjunctiva] : the conjunctiva exhibited no abnormalities [No Deformities] : no deformities [General Appearance - In No Acute Distress] : no acute distress [Normal Oral Mucosa] : normal oral mucosa [Eyelids - No Xanthelasma] : the eyelids demonstrated no xanthelasmas [No Oral Pallor] : no oral pallor [No Oral Cyanosis] : no oral cyanosis [Normal Jugular Venous A Waves Present] : normal jugular venous A waves present [Normal Jugular Venous V Waves Present] : normal jugular venous V waves present [Heart Rate And Rhythm] : heart rate and rhythm were normal [No Jugular Venous Broderick A Waves] : no jugular venous broderick A waves [Murmurs] : no murmurs present [Heart Sounds] : normal S1 and S2 [Respiration, Rhythm And Depth] : normal respiratory rhythm and effort [Auscultation Breath Sounds / Voice Sounds] : lungs were clear to auscultation bilaterally [Exaggerated Use Of Accessory Muscles For Inspiration] : no accessory muscle use [Abdomen Tenderness] : non-tender [Abdomen Soft] : soft [Abdomen Mass (___ Cm)] : no abdominal mass palpated [Nail Clubbing] : no clubbing of the fingernails [Cyanosis, Localized] : no localized cyanosis [Petechial Hemorrhages (___cm)] : no petechial hemorrhages [Skin Color & Pigmentation] : normal skin color and pigmentation [] : no rash [No Venous Stasis] : no venous stasis [No Skin Ulcers] : no skin ulcer [Skin Lesions] : no skin lesions [Oriented To Time, Place, And Person] : oriented to person, place, and time [No Xanthoma] : no  xanthoma was observed [Mood] : the mood was normal [Affect] : the affect was normal [No Anxiety] : not feeling anxious [FreeTextEntry1] : 2+ pitting edema b/l R>L

## 2020-06-24 ENCOUNTER — TRANSCRIPTION ENCOUNTER (OUTPATIENT)
Age: 75
End: 2020-06-24

## 2020-06-26 ENCOUNTER — OUTPATIENT (OUTPATIENT)
Dept: OUTPATIENT SERVICES | Facility: HOSPITAL | Age: 75
LOS: 1 days | End: 2020-06-26

## 2020-06-26 ENCOUNTER — APPOINTMENT (OUTPATIENT)
Dept: CT IMAGING | Facility: CLINIC | Age: 75
End: 2020-06-26
Payer: MEDICARE

## 2020-06-26 DIAGNOSIS — Z98.89 OTHER SPECIFIED POSTPROCEDURAL STATES: Chronic | ICD-10-CM

## 2020-06-26 DIAGNOSIS — M71.21 SYNOVIAL CYST OF POPLITEAL SPACE [BAKER], RIGHT KNEE: Chronic | ICD-10-CM

## 2020-06-26 PROCEDURE — 75574 CT ANGIO HRT W/3D IMAGE: CPT | Mod: 26

## 2020-06-30 VITALS
RESPIRATION RATE: 18 BRPM | WEIGHT: 207.9 LBS | HEART RATE: 72 BPM | DIASTOLIC BLOOD PRESSURE: 58 MMHG | OXYGEN SATURATION: 100 % | TEMPERATURE: 97 F | HEIGHT: 71 IN | SYSTOLIC BLOOD PRESSURE: 135 MMHG

## 2020-06-30 RX ORDER — CHLORHEXIDINE GLUCONATE 213 G/1000ML
1 SOLUTION TOPICAL ONCE
Refills: 0 | Status: DISCONTINUED | OUTPATIENT
Start: 2020-07-01 | End: 2020-07-16

## 2020-06-30 NOTE — H&P ADULT - ASSESSMENT
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     - EKG: NSR @ 67 BPM, no ischemic changes  - ASA: III	 Mallampati: II  - H/H stable: 13.0/40.0. Platelets/ Coags stable.   - Patient took Aspirin 81 mg and Plavix 75 mg this AM on 07/01/2020, reports compliance over past year. Will not load at this time.   - Patient's WBC 15.49. Spoke to fellow,   - Cr stable at 1.17. Patient euvolemic on exam. Patient given NS @ 75 cc/hr x 4 hours.   - Case discussed with Dr. Burnette and fellow.   - Patient is a suitable candidate for moderate sedation.     Risks & benefits of procedure and alternative therapy have been explained to the patient including but not limited to: allergic reaction, bleeding w/possible need for blood transfusion, infection, renal and vascular compromise, limb damage, arrhythmia, stroke, vessel dissection/perforation, Myocardial infarction, emergent CABG. Informed consent obtained and in chart. 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     - EKG: NSR @ 67 BPM, no ischemic changes  - ASA: III	 Mallampati: II  - H/H stable: 13.0/40.0. Platelets/ Coags stable.   - Patient took Aspirin 81 mg and Plavix 75 mg this AM on 07/01/2020, reports compliance over past year. Will not load at this time.   - Cr stable at 1.17. Patient euvolemic on exam. Patient given NS @ 75 cc/hr x 4 hours.  - Patient's WBC 15.49. Case discussed with Dr. Burnette and fellow, Dr. Meléndez. Patient denies fever, chills, urinary changes/ hematuria, or cough. Patient COVID negative as of 06/30/2020.  - Patient is a suitable candidate for moderate sedation.     Risks & benefits of procedure and alternative therapy have been explained to the patient including but not limited to: allergic reaction, bleeding w/possible need for blood transfusion, infection, renal and vascular compromise, limb damage, arrhythmia, stroke, vessel dissection/perforation, Myocardial infarction, emergent CABG. Informed consent obtained and in chart. 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     - EKG: NSR @ 67 BPM, no ischemic changes  - ASA: III	 Mallampati: II  - H/H stable: 13.0/40.0. Platelets/ Coags stable.   - Patient took Aspirin 81 mg and Plavix 75 mg this AM on 07/01/2020, reports compliance over past year. Will not load at this time.   - Cr stable at 1.17. Patient euvolemic on exam. Patient given NS @ 75 cc/hr x 4 hours.  - Patient with DM Type II, on metformin 500 mg BID. Last dose 06/30/2020 in AM. Patient started on insulin sliding scale.   - Patient's WBC 15.49. Case discussed with Dr. Burnette and fellow, Dr. Meléndez. Patient denies fever, chills, urinary changes/ hematuria, or cough. Patient COVID negative as of 06/30/2020.  - Patient is a suitable candidate for moderate sedation.     Risks & benefits of procedure and alternative therapy have been explained to the patient including but not limited to: allergic reaction, bleeding w/possible need for blood transfusion, infection, renal and vascular compromise, limb damage, arrhythmia, stroke, vessel dissection/perforation, Myocardial infarction, emergent CABG. Informed consent obtained and in chart. 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     - EKG: NSR @ 67 BPM, no ischemic changes  - ASA: III	 Mallampati: II  - H/H stable: 13.0/40.0. Platelets/ Coags stable.   - Patient took Aspirin 81 mg and Plavix 75 mg this AM on 07/01/2020, reports compliance over past year. Will not load at this time.   - Cr stable at 1.17. Patient euvolemic on exam. Patient given NS @ 75 cc/hr x 4 hours.  - Patient with DM Type II, on metformin 500 mg BID. Last dose 06/30/2020 in AM. Patient started on insulin sliding scale. Instructed patient to not resume Metformin until 72 hours s/p procedure, on 07/04/2020.   - Patient's WBC 15.49. Case discussed with Dr. Burnette and fellow, Dr. Meléndez. Patient denies fever, chills, urinary changes/ hematuria, or cough. Patient COVID negative as of 06/30/2020.  - Patient is a suitable candidate for moderate sedation.     Risks & benefits of procedure and alternative therapy have been explained to the patient including but not limited to: allergic reaction, bleeding w/possible need for blood transfusion, infection, renal and vascular compromise, limb damage, arrhythmia, stroke, vessel dissection/perforation, Myocardial infarction, emergent CABG. Informed consent obtained and in chart.

## 2020-06-30 NOTE — H&P ADULT - NSHPOUTPATIENTPROVIDERS_GEN_ALL_CORE
Cards: Dr. Rush  Neurosurgery: Dr. Soler  Neuro: Dr. Us Cards: Dr. Rush  Neurosurgery: Dr. Soler  Neuro: Dr. Us  Ortho: Dr. Bolden

## 2020-06-30 NOTE — H&P ADULT - NSICDXPASTSURGICALHX_GEN_ALL_CORE_FT
PAST SURGICAL HISTORY:  Baker's cyst of knee, right 1977    S/P appendectomy 1977    S/P colonoscopy with polypectomy 2013 adenoma    S/P laparoscopic cholecystectomy feb 2014 PAST SURGICAL HISTORY:  Baker's cyst of knee, right 1977    H/O vertebral artery stenosis R vertebral artery stenosis s/p DCA 1/25/20,    S/P appendectomy 1977    S/P colonoscopy with polypectomy 2013 adenoma    S/P laparoscopic cholecystectomy feb 2014

## 2020-06-30 NOTE — H&P ADULT - HISTORY OF PRESENT ILLNESS
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 CCTA 6/26/20: calcium score 1637 (89%ile), pLAD moderate, mLAD severe, dLAD subtotal occlusion, dLAD wraps around LV apex, D1 moderate, D2 severe, D3 severe, pLCx mild, OM1 moderate (high take off), mLCx severe, OM2 severe.    In light of pt's risk factors, CCS anginal Class ___ Sx, abnormal CCTA, pt referred for cardiac cath with possible intervention to r/o underlying CAD. 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  Pt states he can walk 3 blocks max before foot and/or R hip pain make him stop. Pt c/o B/L LE edema over past ____(norvasc changed to HCTZ)    Denies CP, SOB, dizziness, diaphoresis, palpitations, fatigue, orthopnea, PND, syncope, N/V, abdominal pain.     EULALIO 11/21/19: normal LV/RV size and systolic function, LVEF 60%, no signficant valvular disease, no LA/JOANNA/RA/RAA thrombus seen, no e/o intra-cardiac shunting, severe non-mobile plaque in visualized portion of descending aorta, moderate non-mobile plaque in the visualized portion of the aortic arch. CCTA 6/26/20: calcium score 1637 (89%ile), pLAD moderate, mLAD severe, dLAD subtotal occlusion, dLAD wraps around LV apex, D1 moderate, D2 severe, D3 severe, pLCx mild, OM1 moderate (high take off), mLCx severe, OM2 severe.    In light of pt's risk factors, CCS anginal Class ___ Sx, abnormal CCTA, pt referred for cardiac cath with possible intervention to r/o underlying CAD. 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  Pt states he can walk 3 blocks max before R hip pain makes him stop. Pt c/o B/L LE edema over past 6 weeks much improved after Norvasc changed to HCTZ. Planning on going for CT chest today to image lung scarring that has been present since September 11th 2001 ([place] was in neighborhood when towers [place]). Denies CP, SOB, dizziness, diaphoresis, palpitations, fatigue, orthopnea, PND, syncope, N/V, abdominal pain.     EULALIO 11/21/19: normal LV/RV size and systolic function, LVEF 60%, no signficant valvular disease, no LA/JOANNA/RA/RAA thrombus seen, no e/o intra-cardiac shunting, severe non-mobile plaque in visualized portion of descending aorta, moderate non-mobile plaque in the visualized portion of the aortic arch. CCTA 6/26/20: calcium score 1637 (89%ile), pLAD moderate, mLAD severe, dLAD subtotal occlusion, dLAD wraps around LV apex, D1 moderate, D2 severe, D3 severe, pLCx mild, OM1 moderate (high take off), mLCx severe, OM2 severe.    In light of pt's risk factors, abnormal CCTA, pt referred for cardiac cath with possible intervention to r/o underlying CAD for cardiac clearance for R hip surgery. 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  Pt states he can walk 3 blocks max before R hip pain makes him stop. Pt c/o B/L LE edema over past 6 weeks much improved after Norvasc changed to HCTZ. Planning on going for CT chest today to image lung scarring that has been present since September 11th 2001 (he was in neighborhood when towers [place]). Denies CP, SOB, dizziness, diaphoresis, palpitations, fatigue, orthopnea, PND, syncope, N/V, abdominal pain.     EULALIO 11/21/19: normal LV/RV size and systolic function, LVEF 60%, no signficant valvular disease, no LA/JOANNA/RA/RAA thrombus seen, no e/o intra-cardiac shunting, severe non-mobile plaque in visualized portion of descending aorta, moderate non-mobile plaque in the visualized portion of the aortic arch. CCTA 6/26/20: calcium score 1637 (89%ile), pLAD moderate, mLAD severe, dLAD subtotal occlusion, dLAD wraps around LV apex, D1 moderate, D2 severe, D3 severe, pLCx mild, OM1 moderate (high take off), mLCx severe, OM2 severe.    In light of pt's risk factors, abnormal CCTA, pt referred for cardiac cath with possible intervention to r/o underlying CAD for cardiac clearance for R hip surgery. VERIFY MEDS  PREOP R hip surgery, no date scheduled  COVID test 6/30 7:25PM @ [facility] ([facility] this was earliest appointment he could get. Spoke with Dr. Burnette, please bring pt in couple hours early, swab on arrival and isolate until PCR comes back.     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  Pt states he can walk 3 blocks max before R hip pain makes him stop. Pt c/o B/L LE edema over past 6 weeks much improved after Norvasc changed to HCTZ. Planning on going for CT chest today to image lung scarring that has been present since September 11th 2001 (he was in neighborhood when towers [place]). Denies CP, SOB, dizziness, diaphoresis, palpitations, fatigue, orthopnea, PND, syncope, N/V, abdominal pain.     EULALIO 11/21/19: normal LV/RV size and systolic function, LVEF 60%, no signficant valvular disease, no LA/JOANNA/RA/RAA thrombus seen, no e/o intra-cardiac shunting, severe non-mobile plaque in visualized portion of descending aorta, moderate non-mobile plaque in the visualized portion of the aortic arch. CCTA 6/26/20: calcium score 1637 (89%ile), pLAD moderate, mLAD severe, dLAD subtotal occlusion, dLAD wraps around LV apex, D1 moderate, D2 severe, D3 severe, pLCx mild, OM1 moderate (high take off), mLCx severe, OM2 severe.    In light of pt's risk factors, abnormal CCTA, pt referred for cardiac cath with possible intervention to r/o underlying CAD for cardiac clearance for R hip surgery. COVID test 6/30 7:25PM @ [facility] - NEGATIVE, results in paper chart.     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  Pt states he can walk 3 blocks max before R hip pain makes him stop. Pt c/o B/L LE edema over past 6 weeks much improved after Norvasc changed to HCTZ. Planning on going for CT chest today to image lung scarring that has been present since September 11th 2001 (he was in neighborhood when towers [place]). Denies CP, SOB, dizziness, diaphoresis, palpitations, fatigue, orthopnea, PND, syncope, N/V, abdominal pain.     EULALIO 11/21/19: normal LV/RV size and systolic function, LVEF 60%, no signficant valvular disease, no LA/JOANNA/RA/RAA thrombus seen, no e/o intra-cardiac shunting, severe non-mobile plaque in visualized portion of descending aorta, moderate non-mobile plaque in the visualized portion of the aortic arch. CCTA 6/26/20: calcium score 1637 (89%ile), pLAD moderate, mLAD severe, dLAD subtotal occlusion, dLAD wraps around LV apex, D1 moderate, D2 severe, D3 severe, pLCx mild, OM1 moderate (high take off), mLCx severe, OM2 severe.    In light of pt's risk factors, abnormal CCTA, and CCS Class III angina equivalent symptoms, pt referred for cardiac cath with possible intervention to r/o underlying CAD for cardiac clearance for R hip surgery.

## 2020-06-30 NOTE — H&P ADULT - NSICDXPASTMEDICALHX_GEN_ALL_CORE_FT
PAST MEDICAL HISTORY:  Anxiety     Cerebrovascular accident (CVA)     DM (diabetes mellitus)     Hypertension     HARISH (obstructive sleep apnea)     Psoriatic arthritis     Spinal stenosis

## 2020-06-30 NOTE — H&P ADULT - NSICDXFAMILYHX_GEN_ALL_CORE_FT
FAMILY HISTORY:  No pertinent family history in first degree relatives FAMILY HISTORY:  Family history of CVA, Mother @ 86, Father in 70s

## 2020-06-30 NOTE — H&P ADULT - NSHPSOCIALHISTORY_GEN_ALL_CORE
Former smoker, smoked 4 PPD for 8 years in 1970s  Denies illicit drug use  Alcohol? Former smoker, smoked 4 PPD for 8 years in 1970s  Denies illicit drug use  Denies alcohol intake.

## 2020-07-01 ENCOUNTER — OUTPATIENT (OUTPATIENT)
Dept: OUTPATIENT SERVICES | Facility: HOSPITAL | Age: 75
LOS: 1 days | Discharge: HOME | End: 2020-07-01
Payer: MEDICARE

## 2020-07-01 DIAGNOSIS — Z86.79 PERSONAL HISTORY OF OTHER DISEASES OF THE CIRCULATORY SYSTEM: Chronic | ICD-10-CM

## 2020-07-01 DIAGNOSIS — Z98.89 OTHER SPECIFIED POSTPROCEDURAL STATES: Chronic | ICD-10-CM

## 2020-07-01 DIAGNOSIS — M71.21 SYNOVIAL CYST OF POPLITEAL SPACE [BAKER], RIGHT KNEE: Chronic | ICD-10-CM

## 2020-07-01 LAB
A1C WITH ESTIMATED AVERAGE GLUCOSE RESULT: 6.1 % — HIGH (ref 4–5.6)
ALBUMIN SERPL ELPH-MCNC: 4.1 G/DL — SIGNIFICANT CHANGE UP (ref 3.3–5)
ALP SERPL-CCNC: 90 U/L — SIGNIFICANT CHANGE UP (ref 40–120)
ALT FLD-CCNC: 21 U/L — SIGNIFICANT CHANGE UP (ref 10–45)
ANION GAP SERPL CALC-SCNC: 14 MMOL/L — SIGNIFICANT CHANGE UP (ref 5–17)
APTT BLD: 35.4 SEC — SIGNIFICANT CHANGE UP (ref 27.5–35.5)
AST SERPL-CCNC: 17 U/L — SIGNIFICANT CHANGE UP (ref 10–40)
BASOPHILS # BLD AUTO: 0.03 K/UL — SIGNIFICANT CHANGE UP (ref 0–0.2)
BASOPHILS NFR BLD AUTO: 0.2 % — SIGNIFICANT CHANGE UP (ref 0–2)
BILIRUB SERPL-MCNC: 0.5 MG/DL — SIGNIFICANT CHANGE UP (ref 0.2–1.2)
BUN SERPL-MCNC: 35 MG/DL — HIGH (ref 7–23)
CALCIUM SERPL-MCNC: 9.6 MG/DL — SIGNIFICANT CHANGE UP (ref 8.4–10.5)
CHLORIDE SERPL-SCNC: 101 MMOL/L — SIGNIFICANT CHANGE UP (ref 96–108)
CHOLEST SERPL-MCNC: 118 MG/DL — SIGNIFICANT CHANGE UP (ref 10–199)
CK MB CFR SERPL CALC: 3.1 NG/ML — SIGNIFICANT CHANGE UP (ref 0–6.7)
CK SERPL-CCNC: 61 U/L — SIGNIFICANT CHANGE UP (ref 30–200)
CO2 SERPL-SCNC: 24 MMOL/L — SIGNIFICANT CHANGE UP (ref 22–31)
CREAT SERPL-MCNC: 1.17 MG/DL — SIGNIFICANT CHANGE UP (ref 0.5–1.3)
CRP SERPL-MCNC: 0.56 MG/DL — HIGH (ref 0–0.4)
EOSINOPHIL # BLD AUTO: 0.37 K/UL — SIGNIFICANT CHANGE UP (ref 0–0.5)
EOSINOPHIL NFR BLD AUTO: 2.4 % — SIGNIFICANT CHANGE UP (ref 0–6)
ESTIMATED AVERAGE GLUCOSE: 128 MG/DL — HIGH (ref 68–114)
GLUCOSE SERPL-MCNC: 120 MG/DL — HIGH (ref 70–99)
HCT VFR BLD CALC: 40 % — SIGNIFICANT CHANGE UP (ref 39–50)
HDLC SERPL-MCNC: 50 MG/DL — SIGNIFICANT CHANGE UP
HGB BLD-MCNC: 13 G/DL — SIGNIFICANT CHANGE UP (ref 13–17)
IMM GRANULOCYTES NFR BLD AUTO: 0.5 % — SIGNIFICANT CHANGE UP (ref 0–1.5)
INR BLD: 0.96 — SIGNIFICANT CHANGE UP (ref 0.88–1.16)
LIPID PNL WITH DIRECT LDL SERPL: 48 MG/DL — SIGNIFICANT CHANGE UP
LYMPHOCYTES # BLD AUTO: 17.9 % — SIGNIFICANT CHANGE UP (ref 13–44)
LYMPHOCYTES # BLD AUTO: 2.77 K/UL — SIGNIFICANT CHANGE UP (ref 1–3.3)
MCHC RBC-ENTMCNC: 29.7 PG — SIGNIFICANT CHANGE UP (ref 27–34)
MCHC RBC-ENTMCNC: 32.5 GM/DL — SIGNIFICANT CHANGE UP (ref 32–36)
MCV RBC AUTO: 91.5 FL — SIGNIFICANT CHANGE UP (ref 80–100)
MONOCYTES # BLD AUTO: 1.06 K/UL — HIGH (ref 0–0.9)
MONOCYTES NFR BLD AUTO: 6.8 % — SIGNIFICANT CHANGE UP (ref 2–14)
NEUTROPHILS # BLD AUTO: 11.19 K/UL — HIGH (ref 1.8–7.4)
NEUTROPHILS NFR BLD AUTO: 72.2 % — SIGNIFICANT CHANGE UP (ref 43–77)
NRBC # BLD: 0 /100 WBCS — SIGNIFICANT CHANGE UP (ref 0–0)
PLATELET # BLD AUTO: 360 K/UL — SIGNIFICANT CHANGE UP (ref 150–400)
POTASSIUM SERPL-MCNC: 4.6 MMOL/L — SIGNIFICANT CHANGE UP (ref 3.5–5.3)
POTASSIUM SERPL-SCNC: 4.6 MMOL/L — SIGNIFICANT CHANGE UP (ref 3.5–5.3)
PROT SERPL-MCNC: 7.9 G/DL — SIGNIFICANT CHANGE UP (ref 6–8.3)
PROTHROM AB SERPL-ACNC: 11.5 SEC — SIGNIFICANT CHANGE UP (ref 10.6–13.6)
RBC # BLD: 4.37 M/UL — SIGNIFICANT CHANGE UP (ref 4.2–5.8)
RBC # FLD: 14.6 % — HIGH (ref 10.3–14.5)
SODIUM SERPL-SCNC: 139 MMOL/L — SIGNIFICANT CHANGE UP (ref 135–145)
TOTAL CHOLESTEROL/HDL RATIO MEASUREMENT: 2.4 RATIO — LOW (ref 3.4–9.6)
TRIGL SERPL-MCNC: 99 MG/DL — SIGNIFICANT CHANGE UP (ref 10–149)
WBC # BLD: 15.49 K/UL — HIGH (ref 3.8–10.5)
WBC # FLD AUTO: 15.49 K/UL — HIGH (ref 3.8–10.5)

## 2020-07-01 PROCEDURE — 80053 COMPREHEN METABOLIC PANEL: CPT

## 2020-07-01 PROCEDURE — C1887: CPT

## 2020-07-01 PROCEDURE — 86140 C-REACTIVE PROTEIN: CPT

## 2020-07-01 PROCEDURE — 99223 1ST HOSP IP/OBS HIGH 75: CPT

## 2020-07-01 PROCEDURE — C1894: CPT

## 2020-07-01 PROCEDURE — C1769: CPT

## 2020-07-01 PROCEDURE — 93005 ELECTROCARDIOGRAM TRACING: CPT

## 2020-07-01 PROCEDURE — 82550 ASSAY OF CK (CPK): CPT

## 2020-07-01 PROCEDURE — 85730 THROMBOPLASTIN TIME PARTIAL: CPT

## 2020-07-01 PROCEDURE — C1760: CPT

## 2020-07-01 PROCEDURE — 83036 HEMOGLOBIN GLYCOSYLATED A1C: CPT

## 2020-07-01 PROCEDURE — 93010 ELECTROCARDIOGRAM REPORT: CPT

## 2020-07-01 PROCEDURE — 80061 LIPID PANEL: CPT

## 2020-07-01 PROCEDURE — 82553 CREATINE MB FRACTION: CPT

## 2020-07-01 PROCEDURE — 85025 COMPLETE CBC W/AUTO DIFF WBC: CPT

## 2020-07-01 PROCEDURE — 93458 L HRT ARTERY/VENTRICLE ANGIO: CPT | Mod: 26

## 2020-07-01 PROCEDURE — 85610 PROTHROMBIN TIME: CPT

## 2020-07-01 PROCEDURE — 93458 L HRT ARTERY/VENTRICLE ANGIO: CPT

## 2020-07-01 RX ORDER — DEXTROSE 50 % IN WATER 50 %
25 SYRINGE (ML) INTRAVENOUS ONCE
Refills: 0 | Status: DISCONTINUED | OUTPATIENT
Start: 2020-07-01 | End: 2020-07-16

## 2020-07-01 RX ORDER — INSULIN LISPRO 100/ML
VIAL (ML) SUBCUTANEOUS ONCE
Refills: 0 | Status: DISCONTINUED | OUTPATIENT
Start: 2020-07-01 | End: 2020-07-16

## 2020-07-01 RX ORDER — VENLAFAXINE HCL 75 MG
1 CAPSULE, EXT RELEASE 24 HR ORAL
Qty: 0 | Refills: 0 | DISCHARGE

## 2020-07-01 RX ORDER — GLUCAGON INJECTION, SOLUTION 0.5 MG/.1ML
1 INJECTION, SOLUTION SUBCUTANEOUS ONCE
Refills: 0 | Status: DISCONTINUED | OUTPATIENT
Start: 2020-07-01 | End: 2020-07-16

## 2020-07-01 RX ORDER — SODIUM CHLORIDE 9 MG/ML
1000 INJECTION, SOLUTION INTRAVENOUS
Refills: 0 | Status: DISCONTINUED | OUTPATIENT
Start: 2020-07-01 | End: 2020-07-16

## 2020-07-01 RX ORDER — SODIUM CHLORIDE 9 MG/ML
500 INJECTION INTRAMUSCULAR; INTRAVENOUS; SUBCUTANEOUS
Refills: 0 | Status: DISCONTINUED | OUTPATIENT
Start: 2020-07-01 | End: 2020-07-16

## 2020-07-01 RX ORDER — DEXTROSE 50 % IN WATER 50 %
15 SYRINGE (ML) INTRAVENOUS ONCE
Refills: 0 | Status: DISCONTINUED | OUTPATIENT
Start: 2020-07-01 | End: 2020-07-16

## 2020-07-01 RX ORDER — DEXTROSE 50 % IN WATER 50 %
12.5 SYRINGE (ML) INTRAVENOUS ONCE
Refills: 0 | Status: DISCONTINUED | OUTPATIENT
Start: 2020-07-01 | End: 2020-07-16

## 2020-07-01 RX ORDER — OXYCODONE AND ACETAMINOPHEN 5; 325 MG/1; MG/1
1 TABLET ORAL ONCE
Refills: 0 | Status: DISCONTINUED | OUTPATIENT
Start: 2020-07-01 | End: 2020-07-01

## 2020-07-01 RX ORDER — SODIUM CHLORIDE 9 MG/ML
500 INJECTION INTRAMUSCULAR; INTRAVENOUS; SUBCUTANEOUS
Refills: 0 | Status: DISCONTINUED | OUTPATIENT
Start: 2020-07-01 | End: 2020-07-01

## 2020-07-01 RX ADMIN — OXYCODONE AND ACETAMINOPHEN 1 TABLET(S): 5; 325 TABLET ORAL at 20:35

## 2020-07-01 NOTE — CONSULT NOTE ADULT - SUBJECTIVE AND OBJECTIVE BOX
Surgeon: Dr. Hernandez     Requesting Physician: Dr. 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), depression/anxiety who presented to Cardiologist for cardiac clearance for R hip surgery but stated patient needed cardiac clearance before surgery. Pt stated he can walk 3 blocks max before R hip pain makes him stop. Pain is relieved by stopping ambulation. Pt c/o B/L LE edema over past 6 weeks much improved after Norvasc changed to HCTZ. Nothing improved the swelling. Patient presented to the hospital today under the care of Dr. Burnette for diagnostic cardiac cath. Currently pt denies having CP/discomfort or SOB. Currently, pt denies CP, SOB, dizziness, diaphoresis, palpitations, fatigue, orthopnea, PND, syncope, N/V, abdominal pain.    PAST MEDICAL & SURGICAL HISTORY:  Anxiety  Spinal stenosis  HARISH (obstructive sleep apnea)  Cerebrovascular accident (CVA)  DM (diabetes mellitus)  Psoriatic arthritis  Hypertension  H/O vertebral artery stenosis: R vertebral artery stenosis s/p DCA 1/25/20,  S/P laparoscopic cholecystectomy: feb 2014  S/P colonoscopy with polypectomy: 2013 adenoma  Ye's cyst of knee, right: 1977  S/P appendectomy: 1977      MEDICATIONS  (STANDING):  chlorhexidine 4% Liquid 1 Application(s) Topical once  dextrose 5%. 1000 milliLiter(s) (50 mL/Hr) IV Continuous <Continuous>  dextrose 50% Injectable 12.5 Gram(s) IV Push Once  dextrose 50% Injectable 25 Gram(s) IV Push Once  dextrose 50% Injectable 25 Gram(s) IV Push Once  insulin lispro (HumaLOG) corrective regimen sliding scale   SubCutaneous Once  sodium chloride 0.9%. 500 milliLiter(s) (75 mL/Hr) IV Continuous <Continuous>    MEDICATIONS  (PRN):  dextrose 40% Gel 15 Gram(s) Oral Once PRN Blood Glucose LESS THAN 70 milliGRAM(s)/deciliter  glucagon  Injectable 1 milliGRAM(s) IntraMuscular Once PRN Glucose LESS THAN 70 milligrams/deciliter      Allergies    No Known Allergies    Intolerances        SOCIAL HISTORY:  Smoker:  former, quit in 1979   ETOH use:  3 drinks monthly   Ilicit Drug use:  no  Assisted device use (Cane / Walker): occasional cane use   Live with: wife     FAMILY HISTORY:  Family history of CVA: Mother @ 86, Father in 70s      Review of Systems:  CONSTITUTIONAL: Denies fevers / chills, sweats, fatigue, weight loss, weight gain                                       NEURO:  Denies parathesias, seizures, syncope, confusion                                                                                  EYES:  Denies blurry vision, discharge, pain, loss of vision                                                                                    ENMT:  Denies difficulty hearing, vertigo, dysphagia, epistaxis, recent dental work                                       CV:  Denies chest pain, palpitations, SPAIN, orthopnea                                                                                           RESPIRATORY:  Denies wWheezing, SOB, cough / sputum, hemoptysis                                                               GI:  Denies nausea, vomiting, diarrhea, constipation, melena                                                                          : Denies hematuria, dysuria, urgency, incontinence                                                                                          MUSKULOSKELETAL:  + lower extremity swelling b/l. Denies arthritis, joint swelling, muscle weakness                                                             SKIN/BREAST:  Denies rash, itching, hair loss, masses                                                                                              PSYCH:  Denies depression, anxiety, suicidal ideation                                                                                                HEME/LYMPH:  Denies bruises easily, enlarged lymph nodes, tender lymph nodes                                          ENDOCRINE:  Denies cold intolerance, heat intolerance, polydipsia                                                                      Vital Signs Last 24 Hrs  T(C): --  T(F): --  HR: --  BP: --  BP(mean): --  RR: --  SpO2: --    PHYSICAL EXAM:  General: well appearing resting in bed in NAD   HEENT: normocephalic, atraumatic, PERRL   Neurological: AOx3. Motor skills grossly intact  Cardiovascular: Normal S1/S2. Regular rate/rhythm. No murmurs  Respiratory: Lungs CTA bilaterally. No wheezing or rales  Gastrointestinal: +BS in all 4 quadrants. Non-distended. Soft. Non-tender  Extremities: Strength 5/5 b/l upper/lower extremities. Sensation grossly intact upper/lower extremities. Trace LE edema b/l. No calf tenderness.  Vascular: Radial 2+bilaterally, DP 2+ b/l  Incision Sites: right wrist cath site bleeding well controlled                                                             LABS:                        13.0   15.49 )-----------( 360      ( 01 Jul 2020 15:31 )             40.0     07-01    139  |  101  |  35<H>  ----------------------------<  120<H>  4.6   |  24  |  1.17    Ca    9.6      01 Jul 2020 15:31    TPro  7.9  /  Alb  4.1  /  TBili  0.5  /  DBili  x   /  AST  17  /  ALT  21  /  AlkPhos  90  07-01    PT/INR - ( 01 Jul 2020 15:31 )   PT: 11.5 sec;   INR: 0.96          PTT - ( 01 Jul 2020 15:31 )  PTT:35.4 sec    CARDIAC MARKERS ( 01 Jul 2020 15:31 )  x     / x     / 61 U/L / x     / 3.1 ng/mL          RADIOLOGY & ADDITIONAL STUDIES:  Patient will complete PST at later date.

## 2020-07-01 NOTE — PROGRESS NOTE ADULT - SUBJECTIVE AND OBJECTIVE BOX
Interventional Cardiology PA SDA Discharge Note    No complaints.   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 Pt s/p cardiac cath today revealing LM + 3VCAD. CT surgery/Dr. Hernandez to evaluate for OPCAB.     -Tentative plan for return for OPCAB on Monday 7/6  -Stable for discharge as per attending Dr. Burnette after bed rest, pt voids, groin and wrist remain stable, and 30 min. after ambulation.  -Discharge forms signed and copies in chart Interventional Cardiology PA SDA Discharge Note    No complaints.   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 Pt s/p cardiac cath today revealing LM + 3VCAD. CT surgery/Dr. Hernandez to evaluate for OPCAB.     -Plan for patient to return for OPCAB on Monday 7/6  -Stable for discharge as per attending Dr. Burnette after bed rest, pt voids, groin and wrist remain stable, and 30 min. after ambulation.  -Discharge forms signed and copies in chart

## 2020-07-01 NOTE — CONSULT NOTE ADULT - ASSESSMENT
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 Pt stated he can walk 3 blocks max before R hip pain makes him stop. Pain is relieved by stopping ambulation. Pt c/o B/L LE edema over past 6 weeks much improved after Norvasc changed to HCTZ. Nothing improved the swelling. Patient presented to the hospital today under the care of Dr. Burnette for diagnostic cardiac cath. CT surgery consulted for surgical evaluation under the care of Dr. Hernandez.     Plan:  Problem 1: CAD   - Patient with multi-vessel CAD  - Planned for OPCAB with Dr. Hernandez on Monday 7/6/20  - Patient will f/u as an outpatient. Will hear from our office about next steps.   - Cardiac cath completed today   - Patient can be discharged once okay from cath lab perspective.     Problem 2: Right hip OA  - Patient with significant right hip OA, makes it difficult to ambulate and patient occasionally uses a cane as a result   - Stable, f/u as outpatient after cardiac surgery     Problem 3: hx of CVA   - Stable, no residual deficits   - C/w with ASA  - Hold Plavix in light of cardiac surgery on Monday     Problem 4: Hypertension   - c/w Losartan until instructed by Dr. Hernandez's office to stop     Case discussed with Dr. Hernandez. Patient will go to OR on Monday for OPCAB.

## 2020-07-02 VITALS
TEMPERATURE: 97 F | HEIGHT: 71 IN | SYSTOLIC BLOOD PRESSURE: 135 MMHG | RESPIRATION RATE: 18 BRPM | OXYGEN SATURATION: 100 % | WEIGHT: 207.9 LBS | HEART RATE: 72 BPM | DIASTOLIC BLOOD PRESSURE: 58 MMHG

## 2020-07-02 PROBLEM — G47.33 OBSTRUCTIVE SLEEP APNEA (ADULT) (PEDIATRIC): Chronic | Status: ACTIVE | Noted: 2020-06-30

## 2020-07-02 PROBLEM — M48.00 SPINAL STENOSIS, SITE UNSPECIFIED: Chronic | Status: ACTIVE | Noted: 2020-06-30

## 2020-07-02 PROBLEM — F41.9 ANXIETY DISORDER, UNSPECIFIED: Chronic | Status: ACTIVE | Noted: 2020-06-30

## 2020-07-02 PROBLEM — L40.50 ARTHROPATHIC PSORIASIS, UNSPECIFIED: Chronic | Status: ACTIVE | Noted: 2020-06-30

## 2020-07-02 PROBLEM — E11.9 TYPE 2 DIABETES MELLITUS WITHOUT COMPLICATIONS: Chronic | Status: ACTIVE | Noted: 2020-06-30

## 2020-07-02 PROBLEM — I63.9 CEREBRAL INFARCTION, UNSPECIFIED: Chronic | Status: ACTIVE | Noted: 2020-06-30

## 2020-07-02 RX ORDER — CLOPIDOGREL 75 MG/1
75 TABLET, FILM COATED ORAL
Qty: 30 | Refills: 0 | Status: COMPLETED | COMMUNITY
Start: 2020-01-08 | End: 2020-07-02

## 2020-07-02 RX ORDER — ASPIRIN 81 MG
81 TABLET, DELAYED RELEASE (ENTERIC COATED) ORAL DAILY
Qty: 1 | Refills: 5 | Status: ACTIVE | COMMUNITY

## 2020-07-02 RX ORDER — AZELASTINE HYDROCHLORIDE 137 UG/1
137 SPRAY, METERED NASAL TWICE DAILY
Refills: 0 | Status: ACTIVE | COMMUNITY

## 2020-07-02 RX ORDER — ATORVASTATIN CALCIUM 80 MG/1
80 TABLET, FILM COATED ORAL
Qty: 1 | Refills: 3 | Status: ACTIVE | COMMUNITY

## 2020-07-02 RX ORDER — FLUTICASONE PROPIONATE 50 MCG
50 SPRAY, SUSPENSION NASAL DAILY
Refills: 0 | Status: ACTIVE | COMMUNITY

## 2020-07-02 RX ORDER — AMLODIPINE BESYLATE 5 MG/1
5 TABLET ORAL DAILY
Qty: 30 | Refills: 0 | Status: COMPLETED | COMMUNITY
Start: 2019-12-05 | End: 2020-07-02

## 2020-07-02 NOTE — H&P ADULT - HISTORY OF PRESENT ILLNESS
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 Pt stated he can walk 3 blocks max before R hip pain makes him stop. Pain is relieved by stopping ambulation. Pt c/o B/L LE edema over past 6 weeks much improved after Norvasc changed to HCTZ. Nothing improved the swelling. Patient presented to the hospital today under the care of Dr. Burnette for diagnostic cardiac cath. Currently pt denies having CP/discomfort or SOB. Currently, pt denies CP, SOB, dizziness, diaphoresis, palpitations, fatigue, orthopnea, PND, syncope, N/V, abdominal pain. 7/1/19 s/p Cardiac cath that revealed severe 3 vessel CAD. Dr. Hernandez consulted and saw patient in cath lab. Today he presents for elective surgery. 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 Pt stated he can walk 3 blocks max before R hip pain makes him stop. Pain is relieved by stopping ambulation. Pt c/o B/L LE edema over past 6 weeks much improved after Norvasc changed to HCTZ. Nothing improved the swelling. Patient presented to the hospital today under the care of Dr. Burnette for diagnostic cardiac cath. Currently pt denies having CP/discomfort or SOB. Currently, pt denies CP, SOB, dizziness, diaphoresis, palpitations, fatigue, orthopnea, PND, syncope, N/V, abdominal pain. 7/1/19 s/p Cardiac cath that revealed severe 3 vessel CAD. Dr. Hernandez consulted and saw patient in cath lab. Today he presents for elective surgery.     Patient seen in same day holding area; Reports no changes to PMHx or medications since last seen by our team. Denies acute or current SOB, chest pain, palpitation, N/V/D, fever/chills, recent illness, or any other concerning symptoms. Pt reports nothing to eat or drink since last night. 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 Pt stated he can walk 3 blocks max before R hip pain makes him stop. Pain is relieved by stopping ambulation. Pt c/o B/L LE edema over past 6 weeks much improved after Norvasc changed to HCTZ. Nothing improved the swelling. Patient presented to the hospital today under the care of Dr. Burnette for diagnostic cardiac cath. Currently pt denies having CP/discomfort or SOB. Currently, pt denies CP, SOB, dizziness, diaphoresis, palpitations, fatigue, orthopnea, PND, syncope, N/V, abdominal pain. 7/1/19 s/p Cardiac cath that revealed severe 3 vessel CAD. Dr. Hernandez consulted and saw patient in cath lab. Today he presents for elective surgery.     Patient seen in same day holding area; Reports no changes to PMHx or medications since last seen by our team. Denies acute or current SOB, chest pain, palpitation, N/V/D, fever/chills, recent illness, or any other concerning symptoms. Pt reports nothing to eat or drink since last night. Pt took beta blocker this morning

## 2020-07-02 NOTE — PRE-OP CHECKLIST - SELECT TESTS ORDERED
CBC/lipid profile, A1C`/INR/CMP/PT/PTT/CXR/EKG CBC/lipid profile, A1C`/INR/EKG/CMP/PT/PTT/POCT Blood Glucose/CXR

## 2020-07-02 NOTE — H&P ADULT - ASSESSMENT
y5 yo male with PMH of HTN, HLD, DM, left posterior cerebellar stroke, HARISH and right vertebral artery stenosis s/p DCA in 1/25/20 s/p preop clearance for right hip surgery, had + CTA, now s/p cardiac cath that revealed LM and 3 vessel CAD. Dr. Hernandez saw patient in cath lab and reviewed the cardiac cath imaging and reports with the patient and his son and discussed the case with Dr. Burnette. Dr. Hernandez discussed the risks, benefits and alternatives to surgery. Risks include but not limited to death, heart attack, bleeding, stroke, kidney problems and infection. He quoted a 2-3% operative mortality and complication risk.  He also discussed the various approaches, minimally invasive versus sternotomy. Dr. Hernandez feels the patient will benefit and is a candidate for a off pump CABG. All questions were addressed and patient agrees to proceed with surgery.     Plan:   PST  SDA 7/6  pt instructed to take metoprolol the morning of surgery  instructions provided re antibacterial showers and pt given 3 sponges  **Type + Screen x 2 ordered for SDA  **Covid test to be performed on 7/3 via [facility] w/results in HIE

## 2020-07-02 NOTE — H&P ADULT - NSICDXPASTSURGICALHX_GEN_ALL_CORE_FT
PAST SURGICAL HISTORY:  Baker's cyst of knee, right 1977    H/O vertebral artery stenosis R vertebral artery stenosis s/p DCA 1/25/20,    S/P appendectomy 1977    S/P colonoscopy with polypectomy 2013 adenoma    S/P laparoscopic cholecystectomy feb 2014

## 2020-07-03 ENCOUNTER — TRANSCRIPTION ENCOUNTER (OUTPATIENT)
Age: 75
End: 2020-07-03

## 2020-07-03 ENCOUNTER — LABORATORY RESULT (OUTPATIENT)
Age: 75
End: 2020-07-03

## 2020-07-05 ENCOUNTER — TRANSCRIPTION ENCOUNTER (OUTPATIENT)
Age: 75
End: 2020-07-05

## 2020-07-06 ENCOUNTER — INPATIENT (INPATIENT)
Facility: HOSPITAL | Age: 75
LOS: 3 days | Discharge: HOME HEALTH | DRG: 236 | End: 2020-07-10
Attending: THORACIC SURGERY (CARDIOTHORACIC VASCULAR SURGERY) | Admitting: THORACIC SURGERY (CARDIOTHORACIC VASCULAR SURGERY)
Payer: MEDICARE

## 2020-07-06 ENCOUNTER — APPOINTMENT (OUTPATIENT)
Dept: CARDIOTHORACIC SURGERY | Facility: HOSPITAL | Age: 75
End: 2020-07-06

## 2020-07-06 DIAGNOSIS — Z09 ENCOUNTER FOR FOLLOW-UP EXAMINATION AFTER COMPLETED TREATMENT FOR CONDITIONS OTHER THAN MALIGNANT NEOPLASM: ICD-10-CM

## 2020-07-06 DIAGNOSIS — Z98.89 OTHER SPECIFIED POSTPROCEDURAL STATES: Chronic | ICD-10-CM

## 2020-07-06 DIAGNOSIS — M71.21 SYNOVIAL CYST OF POPLITEAL SPACE [BAKER], RIGHT KNEE: Chronic | ICD-10-CM

## 2020-07-06 DIAGNOSIS — Z86.79 PERSONAL HISTORY OF OTHER DISEASES OF THE CIRCULATORY SYSTEM: Chronic | ICD-10-CM

## 2020-07-06 LAB
ALBUMIN SERPL ELPH-MCNC: 2.6 G/DL — LOW (ref 3.3–5)
ALBUMIN SERPL ELPH-MCNC: 2.6 G/DL — LOW (ref 3.3–5)
ALBUMIN SERPL ELPH-MCNC: 2.8 G/DL — LOW (ref 3.3–5)
ALP SERPL-CCNC: 59 U/L — SIGNIFICANT CHANGE UP (ref 40–120)
ALP SERPL-CCNC: 62 U/L — SIGNIFICANT CHANGE UP (ref 40–120)
ALP SERPL-CCNC: 65 U/L — SIGNIFICANT CHANGE UP (ref 40–120)
ALT FLD-CCNC: 15 U/L — SIGNIFICANT CHANGE UP (ref 10–45)
ALT FLD-CCNC: 16 U/L — SIGNIFICANT CHANGE UP (ref 10–45)
ALT FLD-CCNC: 16 U/L — SIGNIFICANT CHANGE UP (ref 10–45)
ANION GAP SERPL CALC-SCNC: 10 MMOL/L — SIGNIFICANT CHANGE UP (ref 5–17)
ANION GAP SERPL CALC-SCNC: 12 MMOL/L — SIGNIFICANT CHANGE UP (ref 5–17)
ANION GAP SERPL CALC-SCNC: 12 MMOL/L — SIGNIFICANT CHANGE UP (ref 5–17)
APTT BLD: 30.5 SEC — SIGNIFICANT CHANGE UP (ref 27.5–35.5)
APTT BLD: 31.5 SEC — SIGNIFICANT CHANGE UP (ref 27.5–35.5)
APTT BLD: 32.3 SEC — SIGNIFICANT CHANGE UP (ref 27.5–35.5)
AST SERPL-CCNC: 16 U/L — SIGNIFICANT CHANGE UP (ref 10–40)
AST SERPL-CCNC: 17 U/L — SIGNIFICANT CHANGE UP (ref 10–40)
AST SERPL-CCNC: 18 U/L — SIGNIFICANT CHANGE UP (ref 10–40)
BASE EXCESS BLDA CALC-SCNC: -2.2 MMOL/L — LOW (ref -2–3)
BASOPHILS # BLD AUTO: 0.04 K/UL — SIGNIFICANT CHANGE UP (ref 0–0.2)
BASOPHILS NFR BLD AUTO: 0.2 % — SIGNIFICANT CHANGE UP (ref 0–2)
BILIRUB SERPL-MCNC: 0.2 MG/DL — SIGNIFICANT CHANGE UP (ref 0.2–1.2)
BILIRUB SERPL-MCNC: 0.6 MG/DL — SIGNIFICANT CHANGE UP (ref 0.2–1.2)
BILIRUB SERPL-MCNC: 0.7 MG/DL — SIGNIFICANT CHANGE UP (ref 0.2–1.2)
BUN SERPL-MCNC: 24 MG/DL — HIGH (ref 7–23)
BUN SERPL-MCNC: 27 MG/DL — HIGH (ref 7–23)
BUN SERPL-MCNC: 28 MG/DL — HIGH (ref 7–23)
CALCIUM SERPL-MCNC: 7.7 MG/DL — LOW (ref 8.4–10.5)
CALCIUM SERPL-MCNC: 8 MG/DL — LOW (ref 8.4–10.5)
CALCIUM SERPL-MCNC: 8.2 MG/DL — LOW (ref 8.4–10.5)
CHLORIDE SERPL-SCNC: 104 MMOL/L — SIGNIFICANT CHANGE UP (ref 96–108)
CO2 SERPL-SCNC: 21 MMOL/L — LOW (ref 22–31)
CO2 SERPL-SCNC: 22 MMOL/L — SIGNIFICANT CHANGE UP (ref 22–31)
CO2 SERPL-SCNC: 22 MMOL/L — SIGNIFICANT CHANGE UP (ref 22–31)
CREAT SERPL-MCNC: 0.89 MG/DL — SIGNIFICANT CHANGE UP (ref 0.5–1.3)
CREAT SERPL-MCNC: 0.97 MG/DL — SIGNIFICANT CHANGE UP (ref 0.5–1.3)
CREAT SERPL-MCNC: 0.98 MG/DL — SIGNIFICANT CHANGE UP (ref 0.5–1.3)
EOSINOPHIL # BLD AUTO: 0.15 K/UL — SIGNIFICANT CHANGE UP (ref 0–0.5)
EOSINOPHIL NFR BLD AUTO: 0.8 % — SIGNIFICANT CHANGE UP (ref 0–6)
GAS PNL BLDA: SIGNIFICANT CHANGE UP
GLUCOSE BLDC GLUCOMTR-MCNC: 103 MG/DL — HIGH (ref 70–99)
GLUCOSE BLDC GLUCOMTR-MCNC: 113 MG/DL — HIGH (ref 70–99)
GLUCOSE BLDC GLUCOMTR-MCNC: 117 MG/DL — HIGH (ref 70–99)
GLUCOSE BLDC GLUCOMTR-MCNC: 140 MG/DL — HIGH (ref 70–99)
GLUCOSE BLDC GLUCOMTR-MCNC: 149 MG/DL — HIGH (ref 70–99)
GLUCOSE BLDC GLUCOMTR-MCNC: 166 MG/DL — HIGH (ref 70–99)
GLUCOSE BLDC GLUCOMTR-MCNC: 92 MG/DL — SIGNIFICANT CHANGE UP (ref 70–99)
GLUCOSE SERPL-MCNC: 118 MG/DL — HIGH (ref 70–99)
GLUCOSE SERPL-MCNC: 177 MG/DL — HIGH (ref 70–99)
GLUCOSE SERPL-MCNC: 179 MG/DL — HIGH (ref 70–99)
HCO3 BLDA-SCNC: 23 MMOL/L — SIGNIFICANT CHANGE UP (ref 21–28)
HCT VFR BLD CALC: 32.1 % — LOW (ref 39–50)
HCT VFR BLD CALC: 33.2 % — LOW (ref 39–50)
HCT VFR BLD CALC: 33.4 % — LOW (ref 39–50)
HGB BLD-MCNC: 10.5 G/DL — LOW (ref 13–17)
HGB BLD-MCNC: 11 G/DL — LOW (ref 13–17)
HGB BLD-MCNC: 11.1 G/DL — LOW (ref 13–17)
IMM GRANULOCYTES NFR BLD AUTO: 0.9 % — SIGNIFICANT CHANGE UP (ref 0–1.5)
INR BLD: 1.03 — SIGNIFICANT CHANGE UP (ref 0.88–1.16)
INR BLD: 1.03 — SIGNIFICANT CHANGE UP (ref 0.88–1.16)
INR BLD: 1.08 — SIGNIFICANT CHANGE UP (ref 0.88–1.16)
LACTATE SERPL-SCNC: 1.3 MMOL/L — SIGNIFICANT CHANGE UP (ref 0.5–2)
LACTATE SERPL-SCNC: 1.7 MMOL/L — SIGNIFICANT CHANGE UP (ref 0.5–2)
LACTATE SERPL-SCNC: 2.3 MMOL/L — HIGH (ref 0.5–2)
LYMPHOCYTES # BLD AUTO: 1.46 K/UL — SIGNIFICANT CHANGE UP (ref 1–3.3)
LYMPHOCYTES # BLD AUTO: 7.9 % — LOW (ref 13–44)
MAGNESIUM SERPL-MCNC: 1.7 MG/DL — SIGNIFICANT CHANGE UP (ref 1.6–2.6)
MAGNESIUM SERPL-MCNC: 2 MG/DL — SIGNIFICANT CHANGE UP (ref 1.6–2.6)
MAGNESIUM SERPL-MCNC: 2.4 MG/DL — SIGNIFICANT CHANGE UP (ref 1.6–2.6)
MCHC RBC-ENTMCNC: 29.3 PG — SIGNIFICANT CHANGE UP (ref 27–34)
MCHC RBC-ENTMCNC: 29.3 PG — SIGNIFICANT CHANGE UP (ref 27–34)
MCHC RBC-ENTMCNC: 29.8 PG — SIGNIFICANT CHANGE UP (ref 27–34)
MCHC RBC-ENTMCNC: 32.7 GM/DL — SIGNIFICANT CHANGE UP (ref 32–36)
MCHC RBC-ENTMCNC: 32.9 GM/DL — SIGNIFICANT CHANGE UP (ref 32–36)
MCHC RBC-ENTMCNC: 33.4 GM/DL — SIGNIFICANT CHANGE UP (ref 32–36)
MCV RBC AUTO: 89 FL — SIGNIFICANT CHANGE UP (ref 80–100)
MCV RBC AUTO: 89.1 FL — SIGNIFICANT CHANGE UP (ref 80–100)
MCV RBC AUTO: 89.7 FL — SIGNIFICANT CHANGE UP (ref 80–100)
MONOCYTES # BLD AUTO: 0.76 K/UL — SIGNIFICANT CHANGE UP (ref 0–0.9)
MONOCYTES NFR BLD AUTO: 4.1 % — SIGNIFICANT CHANGE UP (ref 2–14)
NEUTROPHILS # BLD AUTO: 16 K/UL — HIGH (ref 1.8–7.4)
NEUTROPHILS NFR BLD AUTO: 86.1 % — HIGH (ref 43–77)
NRBC # BLD: 0 /100 WBCS — SIGNIFICANT CHANGE UP (ref 0–0)
PCO2 BLDA: 40 MMHG — SIGNIFICANT CHANGE UP (ref 35–48)
PH BLDA: 7.37 — SIGNIFICANT CHANGE UP (ref 7.35–7.45)
PHOSPHATE SERPL-MCNC: 3.1 MG/DL — SIGNIFICANT CHANGE UP (ref 2.5–4.5)
PHOSPHATE SERPL-MCNC: 3.5 MG/DL — SIGNIFICANT CHANGE UP (ref 2.5–4.5)
PHOSPHATE SERPL-MCNC: 3.6 MG/DL — SIGNIFICANT CHANGE UP (ref 2.5–4.5)
PLATELET # BLD AUTO: 230 K/UL — SIGNIFICANT CHANGE UP (ref 150–400)
PLATELET # BLD AUTO: 258 K/UL — SIGNIFICANT CHANGE UP (ref 150–400)
PLATELET # BLD AUTO: 259 K/UL — SIGNIFICANT CHANGE UP (ref 150–400)
PO2 BLDA: 204 MMHG — HIGH (ref 83–108)
POTASSIUM SERPL-MCNC: 4.1 MMOL/L — SIGNIFICANT CHANGE UP (ref 3.5–5.3)
POTASSIUM SERPL-MCNC: 4.6 MMOL/L — SIGNIFICANT CHANGE UP (ref 3.5–5.3)
POTASSIUM SERPL-MCNC: 4.6 MMOL/L — SIGNIFICANT CHANGE UP (ref 3.5–5.3)
POTASSIUM SERPL-SCNC: 4.1 MMOL/L — SIGNIFICANT CHANGE UP (ref 3.5–5.3)
POTASSIUM SERPL-SCNC: 4.6 MMOL/L — SIGNIFICANT CHANGE UP (ref 3.5–5.3)
POTASSIUM SERPL-SCNC: 4.6 MMOL/L — SIGNIFICANT CHANGE UP (ref 3.5–5.3)
PROT SERPL-MCNC: 5.1 G/DL — LOW (ref 6–8.3)
PROT SERPL-MCNC: 5.4 G/DL — LOW (ref 6–8.3)
PROT SERPL-MCNC: 5.6 G/DL — LOW (ref 6–8.3)
PROTHROM AB SERPL-ACNC: 12.3 SEC — SIGNIFICANT CHANGE UP (ref 10.6–13.6)
PROTHROM AB SERPL-ACNC: 12.3 SEC — SIGNIFICANT CHANGE UP (ref 10.6–13.6)
PROTHROM AB SERPL-ACNC: 12.9 SEC — SIGNIFICANT CHANGE UP (ref 10.6–13.6)
RBC # BLD: 3.58 M/UL — LOW (ref 4.2–5.8)
RBC # BLD: 3.73 M/UL — LOW (ref 4.2–5.8)
RBC # BLD: 3.75 M/UL — LOW (ref 4.2–5.8)
RBC # FLD: 14.6 % — HIGH (ref 10.3–14.5)
RBC # FLD: 14.6 % — HIGH (ref 10.3–14.5)
RBC # FLD: 14.8 % — HIGH (ref 10.3–14.5)
SAO2 % BLDA: 98 % — SIGNIFICANT CHANGE UP (ref 95–100)
SODIUM SERPL-SCNC: 136 MMOL/L — SIGNIFICANT CHANGE UP (ref 135–145)
SODIUM SERPL-SCNC: 137 MMOL/L — SIGNIFICANT CHANGE UP (ref 135–145)
SODIUM SERPL-SCNC: 138 MMOL/L — SIGNIFICANT CHANGE UP (ref 135–145)
WBC # BLD: 17.01 K/UL — HIGH (ref 3.8–10.5)
WBC # BLD: 18.57 K/UL — HIGH (ref 3.8–10.5)
WBC # BLD: 18.7 K/UL — HIGH (ref 3.8–10.5)
WBC # FLD AUTO: 17.01 K/UL — HIGH (ref 3.8–10.5)
WBC # FLD AUTO: 18.57 K/UL — HIGH (ref 3.8–10.5)
WBC # FLD AUTO: 18.7 K/UL — HIGH (ref 3.8–10.5)

## 2020-07-06 PROCEDURE — 76998 US GUIDE INTRAOP: CPT | Mod: 26,AS,59

## 2020-07-06 PROCEDURE — 33518 CABG ARTERY-VEIN TWO: CPT

## 2020-07-06 PROCEDURE — 33518 CABG ARTERY-VEIN TWO: CPT | Mod: AS

## 2020-07-06 PROCEDURE — 33533 CABG ARTERIAL SINGLE: CPT | Mod: AS

## 2020-07-06 PROCEDURE — 99291 CRITICAL CARE FIRST HOUR: CPT

## 2020-07-06 PROCEDURE — 76998 US GUIDE INTRAOP: CPT | Mod: 26,59

## 2020-07-06 PROCEDURE — 99292 CRITICAL CARE ADDL 30 MIN: CPT

## 2020-07-06 PROCEDURE — 33508 ENDOSCOPIC VEIN HARVEST: CPT | Mod: AS

## 2020-07-06 PROCEDURE — 33533 CABG ARTERIAL SINGLE: CPT

## 2020-07-06 PROCEDURE — 71045 X-RAY EXAM CHEST 1 VIEW: CPT | Mod: 26

## 2020-07-06 RX ORDER — SODIUM CHLORIDE 9 MG/ML
1000 INJECTION INTRAMUSCULAR; INTRAVENOUS; SUBCUTANEOUS
Refills: 0 | Status: DISCONTINUED | OUTPATIENT
Start: 2020-07-06 | End: 2020-07-10

## 2020-07-06 RX ORDER — CALCIUM GLUCONATE 100 MG/ML
2 VIAL (ML) INTRAVENOUS ONCE
Refills: 0 | Status: COMPLETED | OUTPATIENT
Start: 2020-07-06 | End: 2020-07-06

## 2020-07-06 RX ORDER — NICARDIPINE HYDROCHLORIDE 30 MG/1
5 CAPSULE, EXTENDED RELEASE ORAL
Qty: 40 | Refills: 0 | Status: DISCONTINUED | OUTPATIENT
Start: 2020-07-06 | End: 2020-07-07

## 2020-07-06 RX ORDER — PANTOPRAZOLE SODIUM 20 MG/1
40 TABLET, DELAYED RELEASE ORAL
Refills: 0 | Status: DISCONTINUED | OUTPATIENT
Start: 2020-07-06 | End: 2020-07-10

## 2020-07-06 RX ORDER — CHLORHEXIDINE GLUCONATE 213 G/1000ML
15 SOLUTION TOPICAL EVERY 12 HOURS
Refills: 0 | Status: DISCONTINUED | OUTPATIENT
Start: 2020-07-06 | End: 2020-07-06

## 2020-07-06 RX ORDER — INSULIN HUMAN 100 [IU]/ML
1 INJECTION, SOLUTION SUBCUTANEOUS
Qty: 100 | Refills: 0 | Status: DISCONTINUED | OUTPATIENT
Start: 2020-07-06 | End: 2020-07-07

## 2020-07-06 RX ORDER — FAMOTIDINE 10 MG/ML
20 INJECTION INTRAVENOUS EVERY 12 HOURS
Refills: 0 | Status: DISCONTINUED | OUTPATIENT
Start: 2020-07-06 | End: 2020-07-06

## 2020-07-06 RX ORDER — CEFAZOLIN SODIUM 1 G
2000 VIAL (EA) INJECTION EVERY 8 HOURS
Refills: 0 | Status: COMPLETED | OUTPATIENT
Start: 2020-07-06 | End: 2020-07-07

## 2020-07-06 RX ORDER — CLOPIDOGREL BISULFATE 75 MG/1
75 TABLET, FILM COATED ORAL DAILY
Refills: 0 | Status: DISCONTINUED | OUTPATIENT
Start: 2020-07-06 | End: 2020-07-10

## 2020-07-06 RX ORDER — DEXTROSE 50 % IN WATER 50 %
50 SYRINGE (ML) INTRAVENOUS
Refills: 0 | Status: DISCONTINUED | OUTPATIENT
Start: 2020-07-06 | End: 2020-07-07

## 2020-07-06 RX ORDER — FENTANYL CITRATE 50 UG/ML
25 INJECTION INTRAVENOUS ONCE
Refills: 0 | Status: DISCONTINUED | OUTPATIENT
Start: 2020-07-06 | End: 2020-07-06

## 2020-07-06 RX ORDER — HYDRALAZINE HCL 50 MG
10 TABLET ORAL ONCE
Refills: 0 | Status: COMPLETED | OUTPATIENT
Start: 2020-07-06 | End: 2020-07-06

## 2020-07-06 RX ORDER — METFORMIN HYDROCHLORIDE 850 MG/1
1 TABLET ORAL
Qty: 0 | Refills: 0 | DISCHARGE

## 2020-07-06 RX ORDER — FENTANYL CITRATE 50 UG/ML
25 INJECTION INTRAVENOUS
Refills: 0 | Status: DISCONTINUED | OUTPATIENT
Start: 2020-07-06 | End: 2020-07-07

## 2020-07-06 RX ORDER — ATORVASTATIN CALCIUM 80 MG/1
80 TABLET, FILM COATED ORAL AT BEDTIME
Refills: 0 | Status: DISCONTINUED | OUTPATIENT
Start: 2020-07-06 | End: 2020-07-10

## 2020-07-06 RX ORDER — HYDROMORPHONE HYDROCHLORIDE 2 MG/ML
0.5 INJECTION INTRAMUSCULAR; INTRAVENOUS; SUBCUTANEOUS ONCE
Refills: 0 | Status: DISCONTINUED | OUTPATIENT
Start: 2020-07-06 | End: 2020-07-06

## 2020-07-06 RX ORDER — ACETAMINOPHEN 500 MG
1000 TABLET ORAL ONCE
Refills: 0 | Status: DISCONTINUED | OUTPATIENT
Start: 2020-07-06 | End: 2020-07-06

## 2020-07-06 RX ORDER — CHLORHEXIDINE GLUCONATE 213 G/1000ML
1 SOLUTION TOPICAL
Refills: 0 | Status: DISCONTINUED | OUTPATIENT
Start: 2020-07-06 | End: 2020-07-10

## 2020-07-06 RX ORDER — DEXMEDETOMIDINE HYDROCHLORIDE IN 0.9% SODIUM CHLORIDE 4 UG/ML
0.4 INJECTION INTRAVENOUS
Qty: 400 | Refills: 0 | Status: DISCONTINUED | OUTPATIENT
Start: 2020-07-06 | End: 2020-07-07

## 2020-07-06 RX ORDER — ALBUMIN HUMAN 25 %
250 VIAL (ML) INTRAVENOUS
Refills: 0 | Status: COMPLETED | OUTPATIENT
Start: 2020-07-06 | End: 2020-07-06

## 2020-07-06 RX ORDER — OXYCODONE AND ACETAMINOPHEN 5; 325 MG/1; MG/1
2 TABLET ORAL EVERY 6 HOURS
Refills: 0 | Status: DISCONTINUED | OUTPATIENT
Start: 2020-07-06 | End: 2020-07-10

## 2020-07-06 RX ORDER — HEPARIN SODIUM 5000 [USP'U]/ML
5000 INJECTION INTRAVENOUS; SUBCUTANEOUS EVERY 8 HOURS
Refills: 0 | Status: DISCONTINUED | OUTPATIENT
Start: 2020-07-06 | End: 2020-07-10

## 2020-07-06 RX ORDER — POLYETHYLENE GLYCOL 3350 17 G/17G
17 POWDER, FOR SOLUTION ORAL DAILY
Refills: 0 | Status: DISCONTINUED | OUTPATIENT
Start: 2020-07-06 | End: 2020-07-06

## 2020-07-06 RX ORDER — SODIUM BICARBONATE 1 MEQ/ML
50 SYRINGE (ML) INTRAVENOUS ONCE
Refills: 0 | Status: COMPLETED | OUTPATIENT
Start: 2020-07-06 | End: 2020-07-06

## 2020-07-06 RX ORDER — ASPIRIN/CALCIUM CARB/MAGNESIUM 324 MG
81 TABLET ORAL DAILY
Refills: 0 | Status: DISCONTINUED | OUTPATIENT
Start: 2020-07-06 | End: 2020-07-10

## 2020-07-06 RX ORDER — OXYCODONE AND ACETAMINOPHEN 5; 325 MG/1; MG/1
1 TABLET ORAL EVERY 4 HOURS
Refills: 0 | Status: DISCONTINUED | OUTPATIENT
Start: 2020-07-06 | End: 2020-07-10

## 2020-07-06 RX ORDER — SODIUM CHLORIDE 9 MG/ML
1000 INJECTION, SOLUTION INTRAVENOUS ONCE
Refills: 0 | Status: COMPLETED | OUTPATIENT
Start: 2020-07-06 | End: 2020-07-06

## 2020-07-06 RX ORDER — POLYETHYLENE GLYCOL 3350 17 G/17G
17 POWDER, FOR SOLUTION ORAL DAILY
Refills: 0 | Status: DISCONTINUED | OUTPATIENT
Start: 2020-07-06 | End: 2020-07-10

## 2020-07-06 RX ORDER — ACETAMINOPHEN 500 MG
1000 TABLET ORAL ONCE
Refills: 0 | Status: COMPLETED | OUTPATIENT
Start: 2020-07-06 | End: 2020-07-06

## 2020-07-06 RX ORDER — ACETAMINOPHEN 500 MG
650 TABLET ORAL EVERY 6 HOURS
Refills: 0 | Status: DISCONTINUED | OUTPATIENT
Start: 2020-07-06 | End: 2020-07-10

## 2020-07-06 RX ORDER — KETOROLAC TROMETHAMINE 30 MG/ML
30 SYRINGE (ML) INJECTION ONCE
Refills: 0 | Status: DISCONTINUED | OUTPATIENT
Start: 2020-07-06 | End: 2020-07-06

## 2020-07-06 RX ORDER — MAGNESIUM SULFATE 500 MG/ML
2 VIAL (ML) INJECTION ONCE
Refills: 0 | Status: COMPLETED | OUTPATIENT
Start: 2020-07-06 | End: 2020-07-06

## 2020-07-06 RX ORDER — MELOXICAM 15 MG/1
1 TABLET ORAL
Qty: 0 | Refills: 0 | DISCHARGE

## 2020-07-06 RX ADMIN — OXYCODONE AND ACETAMINOPHEN 2 TABLET(S): 5; 325 TABLET ORAL at 21:43

## 2020-07-06 RX ADMIN — Medication 50 GRAM(S): at 15:00

## 2020-07-06 RX ADMIN — SODIUM CHLORIDE 1000 MILLILITER(S): 9 INJECTION, SOLUTION INTRAVENOUS at 16:00

## 2020-07-06 RX ADMIN — ATORVASTATIN CALCIUM 80 MILLIGRAM(S): 80 TABLET, FILM COATED ORAL at 21:43

## 2020-07-06 RX ADMIN — NICARDIPINE HYDROCHLORIDE 25 MG/HR: 30 CAPSULE, EXTENDED RELEASE ORAL at 17:48

## 2020-07-06 RX ADMIN — Medication 400 MILLIGRAM(S): at 17:00

## 2020-07-06 RX ADMIN — FENTANYL CITRATE 25 MICROGRAM(S): 50 INJECTION INTRAVENOUS at 16:00

## 2020-07-06 RX ADMIN — HEPARIN SODIUM 5000 UNIT(S): 5000 INJECTION INTRAVENOUS; SUBCUTANEOUS at 21:43

## 2020-07-06 RX ADMIN — Medication 50 MILLIEQUIVALENT(S): at 17:00

## 2020-07-06 RX ADMIN — Medication 100 MILLIGRAM(S): at 15:50

## 2020-07-06 RX ADMIN — SODIUM CHLORIDE 1000 MILLILITER(S): 9 INJECTION, SOLUTION INTRAVENOUS at 13:00

## 2020-07-06 RX ADMIN — CLOPIDOGREL BISULFATE 75 MILLIGRAM(S): 75 TABLET, FILM COATED ORAL at 23:13

## 2020-07-06 RX ADMIN — SODIUM CHLORIDE 10 MILLILITER(S): 9 INJECTION INTRAMUSCULAR; INTRAVENOUS; SUBCUTANEOUS at 13:00

## 2020-07-06 RX ADMIN — Medication 10 MILLIGRAM(S): at 17:45

## 2020-07-06 RX ADMIN — FAMOTIDINE 20 MILLIGRAM(S): 10 INJECTION INTRAVENOUS at 17:45

## 2020-07-06 RX ADMIN — DEXMEDETOMIDINE HYDROCHLORIDE IN 0.9% SODIUM CHLORIDE 9.43 MICROGRAM(S)/KG/HR: 4 INJECTION INTRAVENOUS at 12:50

## 2020-07-06 RX ADMIN — Medication 81 MILLIGRAM(S): at 23:12

## 2020-07-06 RX ADMIN — INSULIN HUMAN 1 UNIT(S)/HR: 100 INJECTION, SOLUTION SUBCUTANEOUS at 16:00

## 2020-07-06 RX ADMIN — Medication 100 MILLIGRAM(S): at 23:47

## 2020-07-06 RX ADMIN — Medication 30 MILLIGRAM(S): at 17:00

## 2020-07-06 RX ADMIN — Medication 250 MILLILITER(S): at 23:47

## 2020-07-06 RX ADMIN — Medication 200 GRAM(S): at 22:52

## 2020-07-06 RX ADMIN — Medication 250 MILLILITER(S): at 23:13

## 2020-07-06 RX ADMIN — HYDROMORPHONE HYDROCHLORIDE 0.5 MILLIGRAM(S): 2 INJECTION INTRAMUSCULAR; INTRAVENOUS; SUBCUTANEOUS at 19:00

## 2020-07-06 NOTE — PROGRESS NOTE ADULT - SUBJECTIVE AND OBJECTIVE BOX
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 Pt stated he can walk 3 blocks max before R hip pain makes him stop. Pain is relieved by stopping ambulation. Pt c/o B/L LE edema over past 6 weeks much improved after Norvasc changed to HCTZ. Nothing improved the swelling. Patient presented to the hospital today under the care of Dr. Burnette for diagnostic cardiac cath. Currently pt denies having CP/discomfort or SOB.  7/1/19 s/p Cardiac cath that revealed severe 3 vessel CAD    s/p OPCAB x 3, normal EF  Insulin gtt, extubated    Vital Signs Last 24 Hrs  T(C): 36 (06 Jul 2020 16:57), Max: 36 (06 Jul 2020 16:57)  T(F): 96.8 (06 Jul 2020 16:57), Max: 96.8 (06 Jul 2020 16:57)  HR: 73 (06 Jul 2020 21:00) (52 - 73)  BP: 112/54 (06 Jul 2020 21:00) (112/54 - 112/54)  BP(mean): 78 (06 Jul 2020 21:00) (78 - 78)  RR: 17 (06 Jul 2020 21:00) (12 - 20)  SpO2: 99% (06 Jul 2020 21:00) (93% - 100%)    alert, awake, verbal  follows commands  Clean sternal incision  S1S2 reg rate  diminished b/l air entry  soft abdomen, non tender, non distended  mild pedal edema, + distal pulse on left lower ext, right leg with gauze  moves all 4 ext    CXR - left pleural tube in place, sternal wires, right ij cath in place, ngt, no infiltrates, post op changes.  WBC 17, hg 11, plt 259  7.46/35/83 - post extubation gas.  Lactate, Blood: 1.3 mmol/L (07-06-20 @ 21:02)    a/p:    CAD s/p OPCAB x3  Acute post op pain  Right vertebral artery stenosis  DM    Art line can likely be removed in AM if no events overnight  ASA, statin and beta blocker when feasible  Pain control PRN  Continue with insulin gtt for now for glycemic control  PO diet  DVT and GI proph    40 minutes of critical care time spent providing medical care for patient's acute illness/conditions that impairs at least one vital organ system and/or poses a high risk of imminent or life threatening deterioration in the patient's condition. It includes time spent evaluating and treating the patient's acute illness as well as time spent reviewing labs, radiology, discussing goals of care with patient and/or patient's family, and discussing the case with a multidisciplinary team in an effort to prevent further life threatening deterioration or end organ damage. This time is independent of any procedures performed.

## 2020-07-06 NOTE — BRIEF OPERATIVE NOTE - COMMENTS
I first assisted for the entirety of the case, including but not limited to conduit harvest, distal/proximal anastamoses, and chest closure.    EVH harvest time:  Prep time: I first assisted for the entirety of the case, including but not limited to conduit harvest, distal/proximal anastamoses, and chest closure.    EVH harvest time: 30  Prep time: 15

## 2020-07-06 NOTE — BRIEF OPERATIVE NOTE - NSICDXBRIEFPROCEDURE_GEN_ALL_CORE_FT
PROCEDURES:  OPCAB (off-pump coronary artery bypass) 06-Jul-2020 12:38:08 LIMA-LAD, SVG-OM-RCA EF Daria Suarez

## 2020-07-06 NOTE — PROGRESS NOTE ADULT - SUBJECTIVE AND OBJECTIVE BOX
CTICU  CRITICAL  CARE  attending     Hand off received 					   Pertinent clinical, laboratory, radiographic, hemodynamic, echocardiographic, respiratory data, microbiologic data and chart were reviewed and analyzed frequently throughout the course of the day and night  Patient seen and examined with CTS/ SH attending at bedside  Pt is a 75y , Male, HEALTH ISSUES - PROBLEM Dx:      , FAMILY HISTORY:  Family history of CVA: Mother @ 86, Father in 70s  PAST MEDICAL & SURGICAL HISTORY:  Anxiety  Spinal stenosis  HARISH (obstructive sleep apnea)  Cerebrovascular accident (CVA)  DM (diabetes mellitus)  Psoriatic arthritis  Hypertension  H/O vertebral artery stenosis: R vertebral artery stenosis s/p DCA 1/25/20,  S/P laparoscopic cholecystectomy: feb 2014  S/P colonoscopy with polypectomy: 2013 adenoma  Ye's cyst of knee, right: 1977  S/P appendectomy: 1977    Patient is a 75y old  Male who presents with a chief complaint of CAD (02 Jul 2020 14:45)      14 system review was unremarkable    Vital signs, hemodynamic and respiratory parameters were reviewed from the bedside nursing flowsheet.  ICU Vital Signs Last 24 Hrs  T(C): 36 (06 Jul 2020 16:57), Max: 36 (06 Jul 2020 16:57)  T(F): 96.8 (06 Jul 2020 16:57), Max: 96.8 (06 Jul 2020 16:57)  HR: 70 (06 Jul 2020 20:00) (52 - 70)  BP: --  BP(mean): --  ABP: 116/40 (06 Jul 2020 20:00) (97/41 - 164/61)  ABP(mean): 65 (06 Jul 2020 20:00) (49 - 98)  RR: 18 (06 Jul 2020 20:00) (12 - 20)  SpO2: 93% (06 Jul 2020 20:00) (93% - 100%)    Adult Advanced Hemodynamics Last 24 Hrs  CVP(mm Hg): 1 (06 Jul 2020 20:00) (1 - 12)  CVP(cm H2O): --  CO: --  CI: --  PA: --  PA(mean): --  PCWP: --  SVR: --  SVRI: --  PVR: --  PVRI: --, ABG - ( 06 Jul 2020 18:45 )  pH, Arterial: 7.42  pH, Blood: x     /  pCO2: 38    /  pO2: 107   / HCO3: 24    / Base Excess: 0.0   /  SaO2: 98                Mode: AC/ CMV (Assist Control/ Continuous Mandatory Ventilation)  RR (machine): 12  TV (machine): 600  FiO2: 80  PEEP: 5  ITime: 1  MAP: 10  PIP: 18    Intake and output was reviewed and the fluid balance was calculated  Daily     Daily   I&O's Summary    06 Jul 2020 07:01  -  06 Jul 2020 21:01  --------------------------------------------------------  IN: 2698.4 mL / OUT: 881 mL / NET: 1817.4 mL        All lines and drain sites were assessed  Glycemic trend was [facility] B[facility]OD [facility]UCOSE      POCT Blood Glucose.: 113 mg/dL (06 Jul 2020 20:58)    No acute change in mental status  Auscultation of the chest reveals equal bs  Abdomen is soft  Extremities are warm and well perfused  Wounds appear clean and unremarkable  Antibiotics are periop    labs  CBC Full  -  ( 06 Jul 2020 14:53 )  WBC Count : 18.70 K/uL  RBC Count : 3.58 M/uL  Hemoglobin : 10.5 g/dL  Hematocrit : 32.1 %  Platelet Count - Automated : 230 K/uL  Mean Cell Volume : 89.7 fl  Mean Cell Hemoglobin : 29.3 pg  Mean Cell Hemoglobin Concentration : 32.7 gm/dL  Auto Neutrophil # : x  Auto Lymphocyte # : x  Auto Monocyte # : x  Auto Eosinophil # : x  Auto Basophil # : x  Auto Neutrophil % : x  Auto Lymphocyte % : x  Auto Monocyte % : x  Auto Eosinophil % : x  Auto Basophil % : x    07-06    136  |  104  |  27<H>  ----------------------------<  179<H>  4.6   |  22  |  0.98    Ca    7.7<L>      06 Jul 2020 14:53  Phos  3.5     07-06  Mg     2.4     07-06    TPro  5.1<L>  /  Alb  2.6<L>  /  TBili  0.6  /  DBili  x   /  AST  17  /  ALT  16  /  AlkPhos  59  07-06    PT/INR - ( 06 Jul 2020 14:53 )   PT: 12.3 sec;   INR: 1.03          PTT - ( 06 Jul 2020 14:53 )  PTT:31.5 sec  The current medications were reviewed   MEDICATIONS  (STANDING):  aspirin enteric coated 81 milliGRAM(s) Oral daily  atorvastatin 80 milliGRAM(s) Oral at bedtime  ceFAZolin   IVPB 2000 milliGRAM(s) IV Intermittent every 8 hours  chlorhexidine 2% Cloths 1 Application(s) Topical <User Schedule>  clopidogrel Tablet 75 milliGRAM(s) Oral daily  dexMEDEtomidine Infusion 0.4 MICROgram(s)/kG/Hr (9.43 mL/Hr) IV Continuous <Continuous>  dextrose 50% Injectable 50 milliLiter(s) IV Push every 15 minutes  heparin   Injectable 5000 Unit(s) SubCutaneous every 8 hours  insulin regular Infusion 1 Unit(s)/Hr (1 mL/Hr) IV Continuous <Continuous>  niCARdipine Infusion 5 mG/Hr (25 mL/Hr) IV Continuous <Continuous>  pantoprazole    Tablet 40 milliGRAM(s) Oral before breakfast  polyethylene glycol 3350 17 Gram(s) Oral daily  sodium chloride 0.9%. 1000 milliLiter(s) (10 mL/Hr) IV Continuous <Continuous>    MEDICATIONS  (PRN):  acetaminophen   Tablet .. 650 milliGRAM(s) Oral every 6 hours PRN Mild Pain (1 - 3)  fentaNYL    Injectable 25 MICROGram(s) IV Push every 3 hours PRN breakthrough  oxycodone    5 mG/acetaminophen 325 mG 1 Tablet(s) Oral every 4 hours PRN Moderate Pain (4 - 6)  oxycodone    5 mG/acetaminophen 325 mG 2 Tablet(s) Oral every 6 hours PRN Severe Pain (7 - 10)       PROBLEM LIST/ ASSESSMENT:  HEALTH ISSUES - PROBLEM Dx:      ,   Patient is a 75y old  Male who presents with a chief complaint of CAD (02 Jul 2020 14:45)     s/p cardiac surgery                My plan includes :  close hemodynamic, ventilatory and drain monitoring and management per post op routine    Monitor for arrhythmias and monitor parameters for organ perfusion  beta blockade not administered due to hemodynamic instability and bradycardia  monitor neurologic status  Head of the bed should remain elevated to 45 deg .   chest PT and IS will be encouraged  monitor adequacy of oxygenation and ventilation and attempt to wean oxygen  antibiotic regimen will be tailored to the clinical, laboratory and microbiologic data  Nutritional goals will be met using po eventually , ensure adequate caloric intake and montior the same  Stress ulcer and VTE prophylaxis will be achieved    Glycemic control is satisfactory  Electrolytes have been repleted as necessary and wound care has been carried out. Pain control has been achieved.   chevyssive physical therapy and early mobility and ambulation goals will be met   The family was updated about the course and plan  CRITICAL CARE TIME personally provided by me  in evaluation and management, reassessments, review and interpretation of labs and x-rays, ventilator and hemodynamic management, formulating a plan and coordinating care: ___90____ MIN.  Time does not include procedural time. Time spent was non routine post-operarive caRE and included multiple and repeated evaluations at the bedside  CTICU ATTENDING     					    Ruiz Gtz MD

## 2020-07-07 PROBLEM — Z09 POSTOP CHECK: Status: ACTIVE | Noted: 2020-07-07

## 2020-07-07 LAB
ALBUMIN SERPL ELPH-MCNC: 2.9 G/DL — LOW (ref 3.3–5)
ALP SERPL-CCNC: 56 U/L — SIGNIFICANT CHANGE UP (ref 40–120)
ALT FLD-CCNC: 12 U/L — SIGNIFICANT CHANGE UP (ref 10–45)
ANION GAP SERPL CALC-SCNC: 11 MMOL/L — SIGNIFICANT CHANGE UP (ref 5–17)
APTT BLD: 30.1 SEC — SIGNIFICANT CHANGE UP (ref 27.5–35.5)
AST SERPL-CCNC: 15 U/L — SIGNIFICANT CHANGE UP (ref 10–40)
BILIRUB SERPL-MCNC: 0.5 MG/DL — SIGNIFICANT CHANGE UP (ref 0.2–1.2)
BUN SERPL-MCNC: 21 MG/DL — SIGNIFICANT CHANGE UP (ref 7–23)
CALCIUM SERPL-MCNC: 8.1 MG/DL — LOW (ref 8.4–10.5)
CHLORIDE SERPL-SCNC: 102 MMOL/L — SIGNIFICANT CHANGE UP (ref 96–108)
CO2 SERPL-SCNC: 22 MMOL/L — SIGNIFICANT CHANGE UP (ref 22–31)
CREAT SERPL-MCNC: 0.9 MG/DL — SIGNIFICANT CHANGE UP (ref 0.5–1.3)
GAS PNL BLDA: SIGNIFICANT CHANGE UP
GLUCOSE BLDC GLUCOMTR-MCNC: 108 MG/DL — HIGH (ref 70–99)
GLUCOSE BLDC GLUCOMTR-MCNC: 133 MG/DL — HIGH (ref 70–99)
GLUCOSE BLDC GLUCOMTR-MCNC: 164 MG/DL — HIGH (ref 70–99)
GLUCOSE BLDC GLUCOMTR-MCNC: 86 MG/DL — SIGNIFICANT CHANGE UP (ref 70–99)
GLUCOSE SERPL-MCNC: 134 MG/DL — HIGH (ref 70–99)
HCT VFR BLD CALC: 29.8 % — LOW (ref 39–50)
HGB BLD-MCNC: 10 G/DL — LOW (ref 13–17)
INR BLD: 1.04 — SIGNIFICANT CHANGE UP (ref 0.88–1.16)
LACTATE SERPL-SCNC: 1.2 MMOL/L — SIGNIFICANT CHANGE UP (ref 0.5–2)
MAGNESIUM SERPL-MCNC: 1.9 MG/DL — SIGNIFICANT CHANGE UP (ref 1.6–2.6)
MCHC RBC-ENTMCNC: 29.9 PG — SIGNIFICANT CHANGE UP (ref 27–34)
MCHC RBC-ENTMCNC: 33.6 GM/DL — SIGNIFICANT CHANGE UP (ref 32–36)
MCV RBC AUTO: 89.2 FL — SIGNIFICANT CHANGE UP (ref 80–100)
NRBC # BLD: 0 /100 WBCS — SIGNIFICANT CHANGE UP (ref 0–0)
PHOSPHATE SERPL-MCNC: 3.6 MG/DL — SIGNIFICANT CHANGE UP (ref 2.5–4.5)
PLATELET # BLD AUTO: 231 K/UL — SIGNIFICANT CHANGE UP (ref 150–400)
POTASSIUM SERPL-MCNC: 4 MMOL/L — SIGNIFICANT CHANGE UP (ref 3.5–5.3)
POTASSIUM SERPL-SCNC: 4 MMOL/L — SIGNIFICANT CHANGE UP (ref 3.5–5.3)
PROT SERPL-MCNC: 5.4 G/DL — LOW (ref 6–8.3)
PROTHROM AB SERPL-ACNC: 12.4 SEC — SIGNIFICANT CHANGE UP (ref 10.6–13.6)
RBC # BLD: 3.34 M/UL — LOW (ref 4.2–5.8)
RBC # FLD: 15.2 % — HIGH (ref 10.3–14.5)
SODIUM SERPL-SCNC: 135 MMOL/L — SIGNIFICANT CHANGE UP (ref 135–145)
WBC # BLD: 12.98 K/UL — HIGH (ref 3.8–10.5)
WBC # FLD AUTO: 12.98 K/UL — HIGH (ref 3.8–10.5)

## 2020-07-07 PROCEDURE — 71045 X-RAY EXAM CHEST 1 VIEW: CPT | Mod: 26,76

## 2020-07-07 PROCEDURE — 99291 CRITICAL CARE FIRST HOUR: CPT

## 2020-07-07 RX ORDER — PHENYLEPHRINE HYDROCHLORIDE 10 MG/ML
0.1 INJECTION INTRAVENOUS
Qty: 40 | Refills: 0 | Status: DISCONTINUED | OUTPATIENT
Start: 2020-07-07 | End: 2020-07-07

## 2020-07-07 RX ORDER — SODIUM CHLORIDE 9 MG/ML
1000 INJECTION, SOLUTION INTRAVENOUS
Refills: 0 | Status: DISCONTINUED | OUTPATIENT
Start: 2020-07-07 | End: 2020-07-10

## 2020-07-07 RX ORDER — FUROSEMIDE 40 MG
10 TABLET ORAL ONCE
Refills: 0 | Status: COMPLETED | OUTPATIENT
Start: 2020-07-07 | End: 2020-07-07

## 2020-07-07 RX ORDER — KETOROLAC TROMETHAMINE 30 MG/ML
30 SYRINGE (ML) INJECTION ONCE
Refills: 0 | Status: DISCONTINUED | OUTPATIENT
Start: 2020-07-07 | End: 2020-07-07

## 2020-07-07 RX ORDER — DEXTROSE 50 % IN WATER 50 %
15 SYRINGE (ML) INTRAVENOUS ONCE
Refills: 0 | Status: DISCONTINUED | OUTPATIENT
Start: 2020-07-07 | End: 2020-07-10

## 2020-07-07 RX ORDER — POTASSIUM CHLORIDE 20 MEQ
20 PACKET (EA) ORAL ONCE
Refills: 0 | Status: COMPLETED | OUTPATIENT
Start: 2020-07-07 | End: 2020-07-07

## 2020-07-07 RX ORDER — GLUCAGON INJECTION, SOLUTION 0.5 MG/.1ML
1 INJECTION, SOLUTION SUBCUTANEOUS ONCE
Refills: 0 | Status: DISCONTINUED | OUTPATIENT
Start: 2020-07-07 | End: 2020-07-10

## 2020-07-07 RX ORDER — ALBUMIN HUMAN 25 %
50 VIAL (ML) INTRAVENOUS
Refills: 0 | Status: COMPLETED | OUTPATIENT
Start: 2020-07-07 | End: 2020-07-07

## 2020-07-07 RX ORDER — DEXTROSE 50 % IN WATER 50 %
25 SYRINGE (ML) INTRAVENOUS ONCE
Refills: 0 | Status: DISCONTINUED | OUTPATIENT
Start: 2020-07-07 | End: 2020-07-10

## 2020-07-07 RX ORDER — MAGNESIUM SULFATE 500 MG/ML
2 VIAL (ML) INJECTION DAILY
Refills: 0 | Status: DISCONTINUED | OUTPATIENT
Start: 2020-07-07 | End: 2020-07-07

## 2020-07-07 RX ORDER — SENNA PLUS 8.6 MG/1
2 TABLET ORAL AT BEDTIME
Refills: 0 | Status: DISCONTINUED | OUTPATIENT
Start: 2020-07-07 | End: 2020-07-10

## 2020-07-07 RX ORDER — DEXTROSE 50 % IN WATER 50 %
12.5 SYRINGE (ML) INTRAVENOUS ONCE
Refills: 0 | Status: DISCONTINUED | OUTPATIENT
Start: 2020-07-07 | End: 2020-07-10

## 2020-07-07 RX ORDER — INSULIN LISPRO 100/ML
VIAL (ML) SUBCUTANEOUS
Refills: 0 | Status: DISCONTINUED | OUTPATIENT
Start: 2020-07-07 | End: 2020-07-10

## 2020-07-07 RX ORDER — SODIUM CHLORIDE 9 MG/ML
3 INJECTION INTRAMUSCULAR; INTRAVENOUS; SUBCUTANEOUS EVERY 8 HOURS
Refills: 0 | Status: DISCONTINUED | OUTPATIENT
Start: 2020-07-07 | End: 2020-07-10

## 2020-07-07 RX ORDER — MAGNESIUM SULFATE 500 MG/ML
2 VIAL (ML) INJECTION ONCE
Refills: 0 | Status: COMPLETED | OUTPATIENT
Start: 2020-07-07 | End: 2020-07-07

## 2020-07-07 RX ADMIN — Medication 50 GRAM(S): at 11:02

## 2020-07-07 RX ADMIN — Medication 30 MILLIGRAM(S): at 08:08

## 2020-07-07 RX ADMIN — CLOPIDOGREL BISULFATE 75 MILLIGRAM(S): 75 TABLET, FILM COATED ORAL at 11:02

## 2020-07-07 RX ADMIN — Medication 100 MILLIGRAM(S): at 16:19

## 2020-07-07 RX ADMIN — ATORVASTATIN CALCIUM 80 MILLIGRAM(S): 80 TABLET, FILM COATED ORAL at 21:54

## 2020-07-07 RX ADMIN — HEPARIN SODIUM 5000 UNIT(S): 5000 INJECTION INTRAVENOUS; SUBCUTANEOUS at 21:55

## 2020-07-07 RX ADMIN — OXYCODONE AND ACETAMINOPHEN 2 TABLET(S): 5; 325 TABLET ORAL at 10:17

## 2020-07-07 RX ADMIN — OXYCODONE AND ACETAMINOPHEN 2 TABLET(S): 5; 325 TABLET ORAL at 03:31

## 2020-07-07 RX ADMIN — Medication 10 MILLIGRAM(S): at 11:02

## 2020-07-07 RX ADMIN — OXYCODONE AND ACETAMINOPHEN 2 TABLET(S): 5; 325 TABLET ORAL at 21:55

## 2020-07-07 RX ADMIN — CHLORHEXIDINE GLUCONATE 1 APPLICATION(S): 213 SOLUTION TOPICAL at 06:11

## 2020-07-07 RX ADMIN — HEPARIN SODIUM 5000 UNIT(S): 5000 INJECTION INTRAVENOUS; SUBCUTANEOUS at 15:50

## 2020-07-07 RX ADMIN — SODIUM CHLORIDE 3 MILLILITER(S): 9 INJECTION INTRAMUSCULAR; INTRAVENOUS; SUBCUTANEOUS at 15:28

## 2020-07-07 RX ADMIN — SENNA PLUS 2 TABLET(S): 8.6 TABLET ORAL at 21:53

## 2020-07-07 RX ADMIN — Medication 2: at 21:54

## 2020-07-07 RX ADMIN — OXYCODONE AND ACETAMINOPHEN 2 TABLET(S): 5; 325 TABLET ORAL at 16:19

## 2020-07-07 RX ADMIN — Medication 81 MILLIGRAM(S): at 11:02

## 2020-07-07 RX ADMIN — HEPARIN SODIUM 5000 UNIT(S): 5000 INJECTION INTRAVENOUS; SUBCUTANEOUS at 06:11

## 2020-07-07 RX ADMIN — Medication 50 MILLILITER(S): at 07:47

## 2020-07-07 RX ADMIN — POLYETHYLENE GLYCOL 3350 17 GRAM(S): 17 POWDER, FOR SOLUTION ORAL at 11:03

## 2020-07-07 RX ADMIN — PANTOPRAZOLE SODIUM 40 MILLIGRAM(S): 20 TABLET, DELAYED RELEASE ORAL at 06:11

## 2020-07-07 RX ADMIN — SODIUM CHLORIDE 3 MILLILITER(S): 9 INJECTION INTRAMUSCULAR; INTRAVENOUS; SUBCUTANEOUS at 21:55

## 2020-07-07 RX ADMIN — Medication 100 MILLIGRAM(S): at 23:52

## 2020-07-07 RX ADMIN — Medication 100 MILLIGRAM(S): at 07:47

## 2020-07-07 RX ADMIN — Medication 20 MILLIEQUIVALENT(S): at 11:24

## 2020-07-07 NOTE — PHYSICAL THERAPY INITIAL EVALUATION ADULT - PERTINENT HX OF CURRENT PROBLEM, REHAB EVAL
75 year old male presented to Cardiologist for cardiac clearance for R hip surgery. Now s/p cardiac cath, presented for elective surgery for 3VCAD.

## 2020-07-07 NOTE — PROGRESS NOTE ADULT - SUBJECTIVE AND OBJECTIVE BOX
Cardiology Note (for Dr. Nancy Rush)    HISTORY OF PRESENT ILLNESS: 75 year-old male w/ PMH of HTN, HARISH (not using CPAP because he has lost weight and feels improved), psoriatic arthritis, s/p PICA stroke in 11/2019, right vertebral artery stenosis and s/p DCA (01/25/2020), severe aortic arch plaque, moderate LVH, followed by Dr. Soler. He presented to us in the outpatient setting for surgical clearance for right hip replacement. We ordered CTA since patient would be unable to perform stress test to evaluate him before giving surgery clearance. After recommended CTA, he was referred to [facility] for cardiac catheterization.  Cardiac cath on 7/1/19 revealed severe 3 vessel CAD and patient is now s/p OPCAB x 3.     PAST MEDICAL & SURGICAL HISTORY:  Anxiety  Spinal stenosis  HARISH (obstructive sleep apnea)  Cerebrovascular accident (CVA)  DM (diabetes mellitus)  Psoriatic arthritis  Hypertension  H/O vertebral artery stenosis: R vertebral artery stenosis s/p DCA 1/25/20,  S/P laparoscopic cholecystectomy: feb 2014  S/P colonoscopy with polypectomy: 2013 adenoma  Ye's cyst of knee, right: 1977  S/P appendectomy: 1977      Allergies:   No Known Allergies      PHYSICAL EXAM:  T(C): 35.9 (07-08-20 @ 09:00), Max: 36.4 (07-07-20 @ 21:16)  T(F): 96.7 (07-08-20 @ 09:00), Max: 97.6 (07-07-20 @ 21:16)  HR: 76 (07-08-20 @ 12:50) (68 - 76)  BP: 155/66 (07-08-20 @ 12:50) (100/54 - 155/66)  RR: 18 (07-08-20 @ 12:50) (15 - 18)  SpO2: 96% (07-08-20 @ 12:50) (93% - 100%)    	  Gen- awake, conversive  Head-NCAT  Neck- no thyromegaly, no cervical LAD  Respiratory- good air entry b/l, no WRR  Cardiovascular- S1S2, RRR, no MRG appr  Gastrointestinal- no HSM, no masses  Neurology- moves all extremities, no focal deficits  Psych- normal affect, non-depressed mood  Skin- no lesions, no rashes    LABS:	                        9.8    12.84 )-----------( 237      ( 08 Jul 2020 07:00 )             30.2     07-08    138  |  102  |  20  ----------------------------<  146<H>  4.2   |  26  |  1.14    Ca    8.6      08 Jul 2020 07:00  Phos  3.0     07-08  Mg     2.3     07-08    TPro  5.4<L>  /  Alb  2.9<L>  /  TBili  0.5  /  DBili  x   /  AST  15  /  ALT  12  /  AlkPhos  56  07-07    I&O's Detail    07 Jul 2020 07:01  -  08 Jul 2020 07:00  --------------------------------------------------------  IN:    Albumin 5%  - 250 mL: 50 mL    IV PiggyBack: 200 mL    Oral Fluid: 150 mL    sodium chloride 0.9%.: 40 mL  Total IN: 440 mL    OUT:    Bulb: 225 mL    Chest Tube: 30 mL    Indwelling Catheter - Urethral: 650 mL    Voided: 1745 mL  Total OUT: 2650 mL    Total NET: -2210 mL      08 Jul 2020 07:01  -  08 Jul 2020 16:10  --------------------------------------------------------  IN:  Total IN: 0 mL    OUT:    Voided: 300 mL  Total OUT: 300 mL    Total NET: -300 mL      ASSESSMENT/RECOMMENDATIONS: 	Patient was seen and examined with attending at bedside. He was resting comfortably and in no acute distress. He was weaned off pressors this morning, his femoral art line d/cd  and extubated. Continue close hemodynamic, ventilatory and drain monitoring as per post op and unit routine. Chest PT and IS use is encouraged.     Will be available to continue to optimize ASCVD risk factors in the outpatient setting.       I am acting as a scribe on behalf of Dr. Nancy Rush,    Lanny Duff, [facility]    Nancy Rush MD  [phone]

## 2020-07-07 NOTE — PHYSICAL THERAPY INITIAL EVALUATION ADULT - ADDITIONAL COMMENTS
mostly drove in the community, ambulates at home, was limited by back and right hip pain. Has a cane but did not use it.

## 2020-07-07 NOTE — PHYSICAL THERAPY INITIAL EVALUATION ADULT - GENERAL OBSERVATIONS, REHAB EVAL
patient received supine with no acute distress. +EKG +central line +chest tube to wall suction x 1 +sandra to wall suction x 2 +caballero +SCDs +NC 6L/min +right lower leg dressing clean/dry/intact +temporary pacemaker

## 2020-07-07 NOTE — PHYSICAL THERAPY INITIAL EVALUATION ADULT - IMPAIRMENTS FOUND, PT EVAL
aerobic capacity/endurance/gait, locomotion, and balance/ventilation and respiration/gas exchange/posture

## 2020-07-07 NOTE — PHYSICAL THERAPY INITIAL EVALUATION ADULT - GAIT DEVIATIONS NOTED, PT EVAL
antalgic gait, slightly unsteady, no loss of balance, limited by increase in pain, forward flexed posture

## 2020-07-07 NOTE — CHART NOTE - NSCHARTNOTEFT_GEN_A_CORE
As per Dr. Hernandez, chest tube removed at bedside without incident. U stitch tied down in place and occlusive dressing applied. Patient tolerated procedure well. Follow up CXR ordered and will follow up closely.

## 2020-07-07 NOTE — PROGRESS NOTE ADULT - SUBJECTIVE AND OBJECTIVE BOX
CTICU  CRITICAL  CARE  attending     Hand off received 					   Pertinent clinical, laboratory, radiographic, hemodynamic, echocardiographic, respiratory data, microbiologic data and chart were reviewed and analyzed frequently throughout the course of the day and night  Patient seen and examined with CTS/ SH attending at bedside    Pt is a 75y , Male, post op day # 1 s/p CABG x 3V    pressors weaned off today am  femoral art line d/cd  extubated  acute post H'gic anemia      , FAMILY HISTORY:  Family history of CVA: Mother @ 86, Father in 70s  PAST MEDICAL & SURGICAL HISTORY:  Anxiety  Spinal stenosis  HARISH (obstructive sleep apnea)  Cerebrovascular accident (CVA)  DM (diabetes mellitus)  Psoriatic arthritis  Hypertension  H/O vertebral artery stenosis: R vertebral artery stenosis s/p DCA 1/25/20,  S/P laparoscopic cholecystectomy: feb 2014  S/P colonoscopy with polypectomy: 2013 adenoma  Ye's cyst of knee, right: 1977  S/P appendectomy: 1977    Patient is a 75y old  Male who presents with a chief complaint of CAD (06 Jul 2020 21:15)      14 system review was unremarkable    Vital signs, hemodynamic and respiratory parameters were reviewed from the bedside nursing flowsheet.  ICU Vital Signs Last 24 Hrs  T(C): 36.6 (07 Jul 2020 09:00), Max: 36.6 (07 Jul 2020 09:00)  T(F): 97.8 (07 Jul 2020 09:00), Max: 97.8 (07 Jul 2020 09:00)  HR: 69 (07 Jul 2020 12:00) (52 - 74)  BP: 110/54 (07 Jul 2020 12:00) (104/50 - 124/55)  BP(mean): 78 (07 Jul 2020 12:00) (69 - 79)  ABP: 132/38 (07 Jul 2020 06:00) (97/41 - 148/55)  ABP(mean): 72 (07 Jul 2020 06:00) (49 - 87)  RR: 18 (07 Jul 2020 12:00) (12 - 21)  SpO2: 94% (07 Jul 2020 12:00) (93% - 100%)    Adult Advanced Hemodynamics Last 24 Hrs  CVP(mm Hg): 23 (07 Jul 2020 12:00) (1 - 34)  CVP(cm H2O): --  CO: --  CI: --  PA: --  PA(mean): --  PCWP: --  SVR: --  SVRI: --  PVR: --  PVRI: --, ABG - ( 07 Jul 2020 03:25 )  pH, Arterial: 7.42  pH, Blood: x     /  pCO2: 34    /  pO2: 76    / HCO3: 22    / Base Excess: -2.4  /  SaO2: 95                  Intake and output was reviewed and the fluid balance was calculated  Daily     Daily   I&O's Summary    06 Jul 2020 07:01  -  07 Jul 2020 07:00  --------------------------------------------------------  IN: 3666 mL / OUT: 1919 mL / NET: 1747 mL    07 Jul 2020 07:01  -  07 Jul 2020 12:58  --------------------------------------------------------  IN: 340 mL / OUT: 730 mL / NET: -390 mL        All lines and drain sites were assessed  Glycemic trend was reviewedCAPILLARY BLOOD GLUCOSE      POCT Blood Glucose.: 133 mg/dL (07 Jul 2020 11:21)    No acute change in mental status  Auscultation of the chest reveals equal bs  Abdomen is soft  Extremities are warm and well perfused  Wounds appear clean and unremarkable  Antibiotics are periop    labs  CBC Full  -  ( 07 Jul 2020 03:26 )  WBC Count : 12.98 K/uL  RBC Count : 3.34 M/uL  Hemoglobin : 10.0 g/dL  Hematocrit : 29.8 %  Platelet Count - Automated : 231 K/uL  Mean Cell Volume : 89.2 fl  Mean Cell Hemoglobin : 29.9 pg  Mean Cell Hemoglobin Concentration : 33.6 gm/dL  Auto Neutrophil # : x  Auto Lymphocyte # : x  Auto Monocyte # : x  Auto Eosinophil # : x  Auto Basophil # : x  Auto Neutrophil % : x  Auto Lymphocyte % : x  Auto Monocyte % : x  Auto Eosinophil % : x  Auto Basophil % : x    07-07    135  |  102  |  21  ----------------------------<  134<H>  4.0   |  22  |  0.90    Ca    8.1<L>      07 Jul 2020 03:26  Phos  3.6     07-07  Mg     1.9     07-07    TPro  5.4<L>  /  Alb  2.9<L>  /  TBili  0.5  /  DBili  x   /  AST  15  /  ALT  12  /  AlkPhos  56  07-07    PT/INR - ( 07 Jul 2020 03:26 )   PT: 12.4 sec;   INR: 1.04          PTT - ( 07 Jul 2020 03:26 )  PTT:30.1 sec  The current medications were reviewed   MEDICATIONS  (STANDING):  aspirin enteric coated 81 milliGRAM(s) Oral daily  atorvastatin 80 milliGRAM(s) Oral at bedtime  ceFAZolin   IVPB 2000 milliGRAM(s) IV Intermittent every 8 hours  chlorhexidine 2% Cloths 1 Application(s) Topical <User Schedule>  clopidogrel Tablet 75 milliGRAM(s) Oral daily  dextrose 5%. 1000 milliLiter(s) (50 mL/Hr) IV Continuous <Continuous>  dextrose 50% Injectable 12.5 Gram(s) IV Push once  dextrose 50% Injectable 25 Gram(s) IV Push once  dextrose 50% Injectable 25 Gram(s) IV Push once  heparin   Injectable 5000 Unit(s) SubCutaneous every 8 hours  insulin lispro (HumaLOG) corrective regimen sliding scale   SubCutaneous Before meals and at bedtime  magnesium sulfate  IVPB 2 Gram(s) IV Intermittent daily  pantoprazole    Tablet 40 milliGRAM(s) Oral before breakfast  polyethylene glycol 3350 17 Gram(s) Oral daily  sodium chloride 0.9% lock flush 3 milliLiter(s) IV Push every 8 hours  sodium chloride 0.9%. 1000 milliLiter(s) (10 mL/Hr) IV Continuous <Continuous>    MEDICATIONS  (PRN):  acetaminophen   Tablet .. 650 milliGRAM(s) Oral every 6 hours PRN Mild Pain (1 - 3)  dextrose 40% Gel 15 Gram(s) Oral once PRN Blood Glucose LESS THAN 70 milliGRAM(s)/deciliter  glucagon  Injectable 1 milliGRAM(s) IntraMuscular once PRN Glucose LESS THAN 70 milligrams/deciliter  oxycodone    5 mG/acetaminophen 325 mG 1 Tablet(s) Oral every 4 hours PRN Moderate Pain (4 - 6)  oxycodone    5 mG/acetaminophen 325 mG 2 Tablet(s) Oral every 6 hours PRN Severe Pain (7 - 10)       PROBLEM LIST/ ASSESSMENT:  HEALTH ISSUES - PROBLEM Dx:      ,   Patient is a 75y old  Male who presents with a chief complaint of CAD (06 Jul 2020 21:15)     s/p CABG      My plan includes :  close hemodynamic, ventilatory and drain monitoring and management per post op routine    Monitor for arrhythmias and monitor parameters for organ perfusion  monitor neurologic status  Head of the bed should remain elevated to 45 deg .   chest PT and IS will be encouraged  monitor adequacy of oxygenation and ventilation and attempt to wean oxygen  Nutritional goals will be met using po eventually , ensure adequate caloric intake and montior the same  Stress ulcer and VTE prophylaxis will be achieved    Glycemic control is satisfactory  Electrolytes have been repleted as necessary and wound care has been carried out. Pain control has been achieved.   agressive physical therapy and early mobility and ambulation goals will be met   The family was updated about the course and plan  CRITICAL CARE TIME SPENT in evaluation and management, reassessments, review and interpretation of labs and x-rays, ventilator and hemodynamic management, formulating a plan and coordinating care: __55___ MIN.  Time does not include procedural time.  CTICU ATTENDING     					    Tr Hong MD

## 2020-07-08 LAB
ANION GAP SERPL CALC-SCNC: 10 MMOL/L — SIGNIFICANT CHANGE UP (ref 5–17)
BASOPHILS # BLD AUTO: 0.03 K/UL — SIGNIFICANT CHANGE UP (ref 0–0.2)
BASOPHILS NFR BLD AUTO: 0.2 % — SIGNIFICANT CHANGE UP (ref 0–2)
BUN SERPL-MCNC: 20 MG/DL — SIGNIFICANT CHANGE UP (ref 7–23)
CALCIUM SERPL-MCNC: 8.6 MG/DL — SIGNIFICANT CHANGE UP (ref 8.4–10.5)
CHLORIDE SERPL-SCNC: 102 MMOL/L — SIGNIFICANT CHANGE UP (ref 96–108)
CO2 SERPL-SCNC: 26 MMOL/L — SIGNIFICANT CHANGE UP (ref 22–31)
CREAT SERPL-MCNC: 1.14 MG/DL — SIGNIFICANT CHANGE UP (ref 0.5–1.3)
EOSINOPHIL # BLD AUTO: 0.2 K/UL — SIGNIFICANT CHANGE UP (ref 0–0.5)
EOSINOPHIL NFR BLD AUTO: 1.6 % — SIGNIFICANT CHANGE UP (ref 0–6)
GLUCOSE BLDC GLUCOMTR-MCNC: 119 MG/DL — HIGH (ref 70–99)
GLUCOSE BLDC GLUCOMTR-MCNC: 135 MG/DL — HIGH (ref 70–99)
GLUCOSE BLDC GLUCOMTR-MCNC: 138 MG/DL — HIGH (ref 70–99)
GLUCOSE BLDC GLUCOMTR-MCNC: 161 MG/DL — HIGH (ref 70–99)
GLUCOSE SERPL-MCNC: 146 MG/DL — HIGH (ref 70–99)
HCT VFR BLD CALC: 30.2 % — LOW (ref 39–50)
HGB BLD-MCNC: 9.8 G/DL — LOW (ref 13–17)
IMM GRANULOCYTES NFR BLD AUTO: 0.7 % — SIGNIFICANT CHANGE UP (ref 0–1.5)
LYMPHOCYTES # BLD AUTO: 16.4 % — SIGNIFICANT CHANGE UP (ref 13–44)
LYMPHOCYTES # BLD AUTO: 2.11 K/UL — SIGNIFICANT CHANGE UP (ref 1–3.3)
MAGNESIUM SERPL-MCNC: 2.3 MG/DL — SIGNIFICANT CHANGE UP (ref 1.6–2.6)
MCHC RBC-ENTMCNC: 30 PG — SIGNIFICANT CHANGE UP (ref 27–34)
MCHC RBC-ENTMCNC: 32.5 GM/DL — SIGNIFICANT CHANGE UP (ref 32–36)
MCV RBC AUTO: 92.4 FL — SIGNIFICANT CHANGE UP (ref 80–100)
MONOCYTES # BLD AUTO: 1.44 K/UL — HIGH (ref 0–0.9)
MONOCYTES NFR BLD AUTO: 11.2 % — SIGNIFICANT CHANGE UP (ref 2–14)
NEUTROPHILS # BLD AUTO: 8.97 K/UL — HIGH (ref 1.8–7.4)
NEUTROPHILS NFR BLD AUTO: 69.9 % — SIGNIFICANT CHANGE UP (ref 43–77)
NRBC # BLD: 0 /100 WBCS — SIGNIFICANT CHANGE UP (ref 0–0)
PHOSPHATE SERPL-MCNC: 3 MG/DL — SIGNIFICANT CHANGE UP (ref 2.5–4.5)
PLATELET # BLD AUTO: 237 K/UL — SIGNIFICANT CHANGE UP (ref 150–400)
POTASSIUM SERPL-MCNC: 4.2 MMOL/L — SIGNIFICANT CHANGE UP (ref 3.5–5.3)
POTASSIUM SERPL-SCNC: 4.2 MMOL/L — SIGNIFICANT CHANGE UP (ref 3.5–5.3)
RBC # BLD: 3.27 M/UL — LOW (ref 4.2–5.8)
RBC # FLD: 15.4 % — HIGH (ref 10.3–14.5)
SODIUM SERPL-SCNC: 138 MMOL/L — SIGNIFICANT CHANGE UP (ref 135–145)
WBC # BLD: 12.84 K/UL — HIGH (ref 3.8–10.5)
WBC # FLD AUTO: 12.84 K/UL — HIGH (ref 3.8–10.5)

## 2020-07-08 PROCEDURE — 71045 X-RAY EXAM CHEST 1 VIEW: CPT | Mod: 26

## 2020-07-08 RX ORDER — LOSARTAN POTASSIUM 100 MG/1
25 TABLET, FILM COATED ORAL DAILY
Refills: 0 | Status: DISCONTINUED | OUTPATIENT
Start: 2020-07-08 | End: 2020-07-09

## 2020-07-08 RX ORDER — POTASSIUM CHLORIDE 20 MEQ
20 PACKET (EA) ORAL ONCE
Refills: 0 | Status: COMPLETED | OUTPATIENT
Start: 2020-07-08 | End: 2020-07-08

## 2020-07-08 RX ORDER — FUROSEMIDE 40 MG
20 TABLET ORAL ONCE
Refills: 0 | Status: COMPLETED | OUTPATIENT
Start: 2020-07-08 | End: 2020-07-08

## 2020-07-08 RX ADMIN — HEPARIN SODIUM 5000 UNIT(S): 5000 INJECTION INTRAVENOUS; SUBCUTANEOUS at 16:27

## 2020-07-08 RX ADMIN — Medication 81 MILLIGRAM(S): at 12:00

## 2020-07-08 RX ADMIN — Medication 20 MILLIGRAM(S): at 12:44

## 2020-07-08 RX ADMIN — CHLORHEXIDINE GLUCONATE 1 APPLICATION(S): 213 SOLUTION TOPICAL at 05:58

## 2020-07-08 RX ADMIN — HEPARIN SODIUM 5000 UNIT(S): 5000 INJECTION INTRAVENOUS; SUBCUTANEOUS at 05:57

## 2020-07-08 RX ADMIN — Medication 20 MILLIEQUIVALENT(S): at 12:44

## 2020-07-08 RX ADMIN — HEPARIN SODIUM 5000 UNIT(S): 5000 INJECTION INTRAVENOUS; SUBCUTANEOUS at 22:23

## 2020-07-08 RX ADMIN — CLOPIDOGREL BISULFATE 75 MILLIGRAM(S): 75 TABLET, FILM COATED ORAL at 12:00

## 2020-07-08 RX ADMIN — LOSARTAN POTASSIUM 25 MILLIGRAM(S): 100 TABLET, FILM COATED ORAL at 16:27

## 2020-07-08 NOTE — PROGRESS NOTE ADULT - SUBJECTIVE AND OBJECTIVE BOX
Patient discussed on morning rounds with Dr. Hernandez    Operation / Date: 7/6/20 OPCAB X3 (LIMA-LAD, SVG-OM-RCA (seq)), EF normal    SUBJECTIVE ASSESSMENT:  75y Male assessed at bedside this morning. The patient states he feels better than yesterday however does report some continued pain and is resisting ambulating (did ambulate with encouragement). He is using IS and pulling 1000 cc. He has not had a bow        Vital Signs Last 24 Hrs  T(C): 35.9 (08 Jul 2020 09:00), Max: 36.4 (07 Jul 2020 21:16)  T(F): 96.7 (08 Jul 2020 09:00), Max: 97.6 (07 Jul 2020 21:16)  HR: 76 (08 Jul 2020 12:50) (68 - 76)  BP: 155/66 (08 Jul 2020 12:50) (100/54 - 155/66)  BP(mean): 95 (08 Jul 2020 12:50) (71 - 95)  RR: 18 (08 Jul 2020 12:50) (15 - 18)  SpO2: 96% (08 Jul 2020 12:50) (93% - 100%)  I&O's Detail    07 Jul 2020 07:01  -  08 Jul 2020 07:00  --------------------------------------------------------  IN:    Albumin 5%  - 250 mL: 50 mL    IV PiggyBack: 200 mL    Oral Fluid: 150 mL    sodium chloride 0.9%.: 40 mL  Total IN: 440 mL    OUT:    Bulb: 225 mL    Chest Tube: 30 mL    Indwelling Catheter - Urethral: 650 mL    Voided: 1745 mL  Total OUT: 2650 mL    Total NET: -2210 mL      08 Jul 2020 07:01  -  08 Jul 2020 16:47  --------------------------------------------------------  IN:  Total IN: 0 mL    OUT:    Voided: 300 mL  Total OUT: 300 mL    Total NET: -300 mL          CHEST TUBE:  Yes/No. AIR LEAKS: Yes/No. Suction / H2O SEAL.   SHERRIE DRAIN:  Yes/No.  EPICARDIAL WIRES: Yes/No.  TIE DOWNS: Yes/No.  ROD: Yes/No.    PHYSICAL EXAM:    General:     Neurological:    Cardiovascular:    Respiratory:    Gastrointestinal:    Extremities:    Vascular:    Incision Sites:    LABS:                        9.8    12.84 )-----------( 237      ( 08 Jul 2020 07:00 )             30.2       COUMADIN:  Yes/No. REASON: .    PT/INR - ( 07 Jul 2020 03:26 )   PT: 12.4 sec;   INR: 1.04          PTT - ( 07 Jul 2020 03:26 )  PTT:30.1 sec    07-08    138  |  102  |  20  ----------------------------<  146<H>  4.2   |  26  |  1.14    Ca    8.6      08 Jul 2020 07:00  Phos  3.0     07-08  Mg     2.3     07-08    TPro  5.4<L>  /  Alb  2.9<L>  /  TBili  0.5  /  DBili  x   /  AST  15  /  ALT  12  /  AlkPhos  56  07-07          MEDICATIONS  (STANDING):  aspirin enteric coated 81 milliGRAM(s) Oral daily  atorvastatin 80 milliGRAM(s) Oral at bedtime  chlorhexidine 2% Cloths 1 Application(s) Topical <User Schedule>  clopidogrel Tablet 75 milliGRAM(s) Oral daily  dextrose 5%. 1000 milliLiter(s) (50 mL/Hr) IV Continuous <Continuous>  dextrose 50% Injectable 12.5 Gram(s) IV Push once  dextrose 50% Injectable 25 Gram(s) IV Push once  dextrose 50% Injectable 25 Gram(s) IV Push once  heparin   Injectable 5000 Unit(s) SubCutaneous every 8 hours  insulin lispro (HumaLOG) corrective regimen sliding scale   SubCutaneous Before meals and at bedtime  losartan 25 milliGRAM(s) Oral daily  pantoprazole    Tablet 40 milliGRAM(s) Oral before breakfast  polyethylene glycol 3350 17 Gram(s) Oral daily  senna 2 Tablet(s) Oral at bedtime  sodium chloride 0.9% lock flush 3 milliLiter(s) IV Push every 8 hours  sodium chloride 0.9%. 1000 milliLiter(s) (10 mL/Hr) IV Continuous <Continuous>    MEDICATIONS  (PRN):  acetaminophen   Tablet .. 650 milliGRAM(s) Oral every 6 hours PRN Mild Pain (1 - 3)  dextrose 40% Gel 15 Gram(s) Oral once PRN Blood Glucose LESS THAN 70 milliGRAM(s)/deciliter  glucagon  Injectable 1 milliGRAM(s) IntraMuscular once PRN Glucose LESS THAN 70 milligrams/deciliter  oxycodone    5 mG/acetaminophen 325 mG 1 Tablet(s) Oral every 4 hours PRN Moderate Pain (4 - 6)  oxycodone    5 mG/acetaminophen 325 mG 2 Tablet(s) Oral every 6 hours PRN Severe Pain (7 - 10)        RADIOLOGY & ADDITIONAL TESTS: Patient discussed on morning rounds with Dr. Hernandez    Operation / Date: 7/6/20 OPCAB X3 (LIMA-LAD, SVG-OM-RCA (seq)), EF normal    SUBJECTIVE ASSESSMENT:  75y Male assessed at bedside this morning. The patient states he feels better than yesterday however does report some continued pain and is resisting ambulating (did ambulate with encouragement). He is using IS and pulling 1000 cc. He has not had a bowel movement but endorses flatus. He denies fever, chills, nausea, vomiting.     Vital Signs Last 24 Hrs  T(C): 35.9 (08 Jul 2020 09:00), Max: 36.4 (07 Jul 2020 21:16)  T(F): 96.7 (08 Jul 2020 09:00), Max: 97.6 (07 Jul 2020 21:16)  HR: 76 (08 Jul 2020 12:50) (68 - 76)  BP: 155/66 (08 Jul 2020 12:50) (100/54 - 155/66)  BP(mean): 95 (08 Jul 2020 12:50) (71 - 95)  RR: 18 (08 Jul 2020 12:50) (15 - 18)  SpO2: 96% (08 Jul 2020 12:50) (93% - 100%)  I&O's Detail    07 Jul 2020 07:01  -  08 Jul 2020 07:00  --------------------------------------------------------  IN:    Albumin 5%  - 250 mL: 50 mL    IV PiggyBack: 200 mL    Oral Fluid: 150 mL    sodium chloride 0.9%.: 40 mL  Total IN: 440 mL    OUT:    Bulb: 225 mL    Chest Tube: 30 mL    Indwelling Catheter - Urethral: 650 mL    Voided: 1745 mL  Total OUT: 2650 mL    Total NET: -2210 mL      08 Jul 2020 07:01  -  08 Jul 2020 16:47  --------------------------------------------------------  IN:  Total IN: 0 mL    OUT:    Voided: 300 mL  Total OUT: 300 mL    Total NET: -300 mL    CHEST TUBE:  No  SHERRIE DRAIN:  No.  EPICARDIAL WIRES: Yes  TIE DOWNS: Yes  ROD: No.    Physical Exam  CONSTITUTIONAL: Well appearing in NAD assessed laying comfortably in bed   NEURO: No focal deficits noted, moving bilateral upper and lower extremities                    CV: RRR, no murmurs, rubs, gallops  RESPIRATORY: Clear to auscultation bilateral posterior lung fields, no wheezes, rales, rhonchi   GI: +BS, NT/ND  MUSKULOSKELETAL: No peripheral edema or calf tenderness. Full strength and ROM bilateral upper and lower extremities   VASCULAR: Bilateral distal pulses 2+  INCISIONS: MSI intact, mepelex dressing in place.      LABS:                        9.8    12.84 )-----------( 237      ( 08 Jul 2020 07:00 )             30.2       COUMADIN:  No.     PT/INR - ( 07 Jul 2020 03:26 )   PT: 12.4 sec;   INR: 1.04          PTT - ( 07 Jul 2020 03:26 )  PTT:30.1 sec    07-08    138  |  102  |  20  ----------------------------<  146<H>  4.2   |  26  |  1.14    Ca    8.6      08 Jul 2020 07:00  Phos  3.0     07-08  Mg     2.3     07-08    TPro  5.4<L>  /  Alb  2.9<L>  /  TBili  0.5  /  DBili  x   /  AST  15  /  ALT  12  /  AlkPhos  56  07-07    MEDICATIONS  (STANDING):  aspirin enteric coated 81 milliGRAM(s) Oral daily  atorvastatin 80 milliGRAM(s) Oral at bedtime  chlorhexidine 2% Cloths 1 Application(s) Topical <User Schedule>  clopidogrel Tablet 75 milliGRAM(s) Oral daily  dextrose 5%. 1000 milliLiter(s) (50 mL/Hr) IV Continuous <Continuous>  dextrose 50% Injectable 12.5 Gram(s) IV Push once  dextrose 50% Injectable 25 Gram(s) IV Push once  dextrose 50% Injectable 25 Gram(s) IV Push once  heparin   Injectable 5000 Unit(s) SubCutaneous every 8 hours  insulin lispro (HumaLOG) corrective regimen sliding scale   SubCutaneous Before meals and at bedtime  losartan 25 milliGRAM(s) Oral daily  pantoprazole    Tablet 40 milliGRAM(s) Oral before breakfast  polyethylene glycol 3350 17 Gram(s) Oral daily  senna 2 Tablet(s) Oral at bedtime  sodium chloride 0.9% lock flush 3 milliLiter(s) IV Push every 8 hours  sodium chloride 0.9%. 1000 milliLiter(s) (10 mL/Hr) IV Continuous <Continuous>    MEDICATIONS  (PRN):  acetaminophen   Tablet .. 650 milliGRAM(s) Oral every 6 hours PRN Mild Pain (1 - 3)  dextrose 40% Gel 15 Gram(s) Oral once PRN Blood Glucose LESS THAN 70 milliGRAM(s)/deciliter  glucagon  Injectable 1 milliGRAM(s) IntraMuscular once PRN Glucose LESS THAN 70 milligrams/deciliter  oxycodone    5 mG/acetaminophen 325 mG 1 Tablet(s) Oral every 4 hours PRN Moderate Pain (4 - 6)  oxycodone    5 mG/acetaminophen 325 mG 2 Tablet(s) Oral every 6 hours PRN Severe Pain (7 - 10)      RADIOLOGY & ADDITIONAL TESTS:  < from: Xray Chest 1 View-PORTABLE IMMEDIATE (07.08.20 @ 11:10) >  Indication: Removal of mediastinal drain    2 AP views of the chest are compared to the prior study of the same day. Mediastinal drain has been removed. Right IJ line remains in place. Stable heart size. No new consolidation. Probable small right pleural effusion. No pneumothorax.      IMPRESSION: Removal of mediastinal drain. No significant change. Patient discussed on morning rounds with Dr. Hernandez    Operation / Date: 7/6/20 OPCAB X3 (LIMA-LAD, SVG-OM-RCA (seq)), EF normal    SUBJECTIVE ASSESSMENT:  75y Male assessed at bedside this morning. The patient states he feels better than yesterday however does report some continued pain and is resisting ambulating (did ambulate with encouragement). He is using IS and pulling 1000 cc. He has not had a bowel movement but endorses flatus. He denies fever, chills, nausea, vomiting.     Vital Signs Last 24 Hrs  T(C): 35.9 (08 Jul 2020 09:00), Max: 36.4 (07 Jul 2020 21:16)  T(F): 96.7 (08 Jul 2020 09:00), Max: 97.6 (07 Jul 2020 21:16)  HR: 76 (08 Jul 2020 12:50) (68 - 76)  BP: 155/66 (08 Jul 2020 12:50) (100/54 - 155/66)  BP(mean): 95 (08 Jul 2020 12:50) (71 - 95)  RR: 18 (08 Jul 2020 12:50) (15 - 18)  SpO2: 96% (08 Jul 2020 12:50) (93% - 100%)  I&O's Detail    07 Jul 2020 07:01  -  08 Jul 2020 07:00  --------------------------------------------------------  IN:    Albumin 5%  - 250 mL: 50 mL    IV PiggyBack: 200 mL    Oral Fluid: 150 mL    sodium chloride 0.9%.: 40 mL  Total IN: 440 mL    OUT:    Bulb: 225 mL    Chest Tube: 30 mL    Indwelling Catheter - Urethral: 650 mL    Voided: 1745 mL  Total OUT: 2650 mL    Total NET: -2210 mL      08 Jul 2020 07:01  -  08 Jul 2020 16:47  --------------------------------------------------------  IN:  Total IN: 0 mL    OUT:    Voided: 300 mL  Total OUT: 300 mL    Total NET: -300 mL    CHEST TUBE:  No  SHERRIE DRAIN:  No.  EPICARDIAL WIRES: Yes  TIE DOWNS: Yes  ROD: No.    Physical Exam  CONSTITUTIONAL: Well appearing in NAD assessed laying comfortably in bed   NEURO: No focal deficits noted, moving bilateral upper and lower extremities                    CV: RRR, no murmurs, rubs, gallops  RESPIRATORY: Clear to auscultation bilateral posterior lung fields, no wheezes, rales, rhonchi   GI: +BS, NT/ND  MUSKULOSKELETAL: No peripheral edema or calf tenderness. Full strength and ROM bilateral upper and lower extremities   VASCULAR: Bilateral distal pulses 2+  INCISIONS: MSI intact, mepelex dressing in place.      LABS:                        9.8    12.84 )-----------( 237      ( 08 Jul 2020 07:00 )             30.2       COUMADIN:  No.     PT/INR - ( 07 Jul 2020 03:26 )   PT: 12.4 sec;   INR: 1.04          PTT - ( 07 Jul 2020 03:26 )  PTT:30.1 sec    07-08    138  |  102  |  20  ----------------------------<  146<H>  4.2   |  26  |  1.14    Ca    8.6      08 Jul 2020 07:00  Phos  3.0     07-08  Mg     2.3     07-08    TPro  5.4<L>  /  Alb  2.9<L>  /  TBili  0.5  /  DBili  x   /  AST  15  /  ALT  12  /  AlkPhos  56  07-07    MEDICATIONS  (STANDING):  aspirin enteric coated 81 milliGRAM(s) Oral daily  atorvastatin 80 milliGRAM(s) Oral at bedtime  chlorhexidine 2% Cloths 1 Application(s) Topical <User Schedule>  clopidogrel Tablet 75 milliGRAM(s) Oral daily  dextrose 5%. 1000 milliLiter(s) (50 mL/Hr) IV Continuous <Continuous>  dextrose 50% Injectable 12.5 Gram(s) IV Push once  dextrose 50% Injectable 25 Gram(s) IV Push once  dextrose 50% Injectable 25 Gram(s) IV Push once  heparin   Injectable 5000 Unit(s) SubCutaneous every 8 hours  insulin lispro (HumaLOG) corrective regimen sliding scale   SubCutaneous Before meals and at bedtime  losartan 25 milliGRAM(s) Oral daily  pantoprazole    Tablet 40 milliGRAM(s) Oral before breakfast  polyethylene glycol 3350 17 Gram(s) Oral daily  senna 2 Tablet(s) Oral at bedtime  sodium chloride 0.9% lock flush 3 milliLiter(s) IV Push every 8 hours  sodium chloride 0.9%. 1000 milliLiter(s) (10 mL/Hr) IV Continuous <Continuous>    MEDICATIONS  (PRN):  acetaminophen   Tablet .. 650 milliGRAM(s) Oral every 6 hours PRN Mild Pain (1 - 3)  dextrose 40% Gel 15 Gram(s) Oral once PRN Blood Glucose LESS THAN 70 milliGRAM(s)/deciliter  glucagon  Injectable 1 milliGRAM(s) IntraMuscular once PRN Glucose LESS THAN 70 milligrams/deciliter  oxycodone    5 mG/acetaminophen 325 mG 1 Tablet(s) Oral every 4 hours PRN Moderate Pain (4 - 6)  oxycodone    5 mG/acetaminophen 325 mG 2 Tablet(s) Oral every 6 hours PRN Severe Pain (7 - 10)      RADIOLOGY & ADDITIONAL TESTS:  < from: Xray Chest 1 View-PORTABLE IMMEDIATE (07.08.20 @ 11:10) >  Indication: Removal of mediastinal drain    2 AP views of the chest are compared to the prior study of the same day. Mediastinal drain has been removed. Right IJ line remains in place. Stable heart size. No new consolidation. Probable small right pleural effusion. No pneumothorax.      IMPRESSION: Removal of mediastinal drain. No significant change.    A/P:    Mr. 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 The patient presented to [facility] on 7/2 for a diagnostic cardiac catheterization.     Neurovascular:   - No delirium. Pain well controlled with current regimen.  -Continue tylenol PRN    Cardiovascular:   - POD_ s/p _  -Hemodynamically stable. HR controlled (X-X)  - Hx of HTN, BP controlled (X-X)  - ASA, Plavix, BB, statin  - EKG. TTE. Cardiac Panel. Lipid Panel. BNP.      Respiratory:   - 02 Sat = 98% on RA.  -If on oxygen wean to RA from for O2 Sat > 93%.  -Encourage C+DB and Use of IS 10x / hr while awake.  -CXR.    GI:   - Stable.  -NPO after MN.  -Continue GI PPX with protonix  -PO Diet.    Renal / :  - BUN/Cr stable at X/X  -Continue to monitor renal function.  -Monitor I/O's.  - Replete electrolytes PRN    Endocrine:    -A1c.  -TSH.    Hematologic:  -H/H stable at X/X with no obvious signs of bleeding  -Coagulation Panel.    ID:  -Pt remains afebrile with no elevation in WBC or signs of infection  -Continue to monitor CBC  -Observe for SIRS/Sepsis Syndrome.    Prophylaxis:  -DVT prophylaxis with 5000 SubQ Heparin q8h.  -SCD's    Disposition:  -Home when medically appropriate. Patient discussed on morning rounds with Dr. Hernandez    Operation / Date: 7/6/20 OPCAB X3 (LIMA-LAD, SVG-OM-RCA (seq)), EF normal    SUBJECTIVE ASSESSMENT:  75y Male assessed at bedside this morning. The patient states he feels better than yesterday however does report some continued pain and is resisting ambulating (did ambulate with encouragement). He is using IS and pulling 1000 cc. He has not had a bowel movement but endorses flatus. He denies fever, chills, nausea, vomiting.     Vital Signs Last 24 Hrs  T(C): 35.9 (08 Jul 2020 09:00), Max: 36.4 (07 Jul 2020 21:16)  T(F): 96.7 (08 Jul 2020 09:00), Max: 97.6 (07 Jul 2020 21:16)  HR: 76 (08 Jul 2020 12:50) (68 - 76)  BP: 155/66 (08 Jul 2020 12:50) (100/54 - 155/66)  BP(mean): 95 (08 Jul 2020 12:50) (71 - 95)  RR: 18 (08 Jul 2020 12:50) (15 - 18)  SpO2: 96% (08 Jul 2020 12:50) (93% - 100%)  I&O's Detail    07 Jul 2020 07:01  -  08 Jul 2020 07:00  --------------------------------------------------------  IN:    Albumin 5%  - 250 mL: 50 mL    IV PiggyBack: 200 mL    Oral Fluid: 150 mL    sodium chloride 0.9%.: 40 mL  Total IN: 440 mL    OUT:    Bulb: 225 mL    Chest Tube: 30 mL    Indwelling Catheter - Urethral: 650 mL    Voided: 1745 mL  Total OUT: 2650 mL    Total NET: -2210 mL      08 Jul 2020 07:01  -  08 Jul 2020 16:47  --------------------------------------------------------  IN:  Total IN: 0 mL    OUT:    Voided: 300 mL  Total OUT: 300 mL    Total NET: -300 mL    CHEST TUBE:  No  SHERRIE DRAIN:  No.  EPICARDIAL WIRES: Yes  TIE DOWNS: Yes  ROD: No.    Physical Exam  CONSTITUTIONAL: Well appearing in NAD assessed laying comfortably in bed   NEURO: No focal deficits noted, moving bilateral upper and lower extremities                    CV: RRR, no murmurs, rubs, gallops  RESPIRATORY: Clear to auscultation bilateral posterior lung fields, no wheezes, rales, rhonchi   GI: +BS, NT/ND  MUSKULOSKELETAL: No peripheral edema or calf tenderness. Full strength and ROM bilateral upper and lower extremities   VASCULAR: Bilateral distal pulses 2+  INCISIONS: MSI intact, mepelex dressing in place.      LABS:                        9.8    12.84 )-----------( 237      ( 08 Jul 2020 07:00 )             30.2       COUMADIN:  No.     PT/INR - ( 07 Jul 2020 03:26 )   PT: 12.4 sec;   INR: 1.04          PTT - ( 07 Jul 2020 03:26 )  PTT:30.1 sec    07-08    138  |  102  |  20  ----------------------------<  146<H>  4.2   |  26  |  1.14    Ca    8.6      08 Jul 2020 07:00  Phos  3.0     07-08  Mg     2.3     07-08    TPro  5.4<L>  /  Alb  2.9<L>  /  TBili  0.5  /  DBili  x   /  AST  15  /  ALT  12  /  AlkPhos  56  07-07    MEDICATIONS  (STANDING):  aspirin enteric coated 81 milliGRAM(s) Oral daily  atorvastatin 80 milliGRAM(s) Oral at bedtime  chlorhexidine 2% Cloths 1 Application(s) Topical <User Schedule>  clopidogrel Tablet 75 milliGRAM(s) Oral daily  dextrose 5%. 1000 milliLiter(s) (50 mL/Hr) IV Continuous <Continuous>  dextrose 50% Injectable 12.5 Gram(s) IV Push once  dextrose 50% Injectable 25 Gram(s) IV Push once  dextrose 50% Injectable 25 Gram(s) IV Push once  heparin   Injectable 5000 Unit(s) SubCutaneous every 8 hours  insulin lispro (HumaLOG) corrective regimen sliding scale   SubCutaneous Before meals and at bedtime  losartan 25 milliGRAM(s) Oral daily  pantoprazole    Tablet 40 milliGRAM(s) Oral before breakfast  polyethylene glycol 3350 17 Gram(s) Oral daily  senna 2 Tablet(s) Oral at bedtime  sodium chloride 0.9% lock flush 3 milliLiter(s) IV Push every 8 hours  sodium chloride 0.9%. 1000 milliLiter(s) (10 mL/Hr) IV Continuous <Continuous>    MEDICATIONS  (PRN):  acetaminophen   Tablet .. 650 milliGRAM(s) Oral every 6 hours PRN Mild Pain (1 - 3)  dextrose 40% Gel 15 Gram(s) Oral once PRN Blood Glucose LESS THAN 70 milliGRAM(s)/deciliter  glucagon  Injectable 1 milliGRAM(s) IntraMuscular once PRN Glucose LESS THAN 70 milligrams/deciliter  oxycodone    5 mG/acetaminophen 325 mG 1 Tablet(s) Oral every 4 hours PRN Moderate Pain (4 - 6)  oxycodone    5 mG/acetaminophen 325 mG 2 Tablet(s) Oral every 6 hours PRN Severe Pain (7 - 10)      RADIOLOGY & ADDITIONAL TESTS:  < from: Xray Chest 1 View-PORTABLE IMMEDIATE (07.08.20 @ 11:10) >  Indication: Removal of mediastinal drain    2 AP views of the chest are compared to the prior study of the same day. Mediastinal drain has been removed. Right IJ line remains in place. Stable heart size. No new consolidation. Probable small right pleural effusion. No pneumothorax.      IMPRESSION: Removal of mediastinal drain. No significant change.    A/P:    Mr. 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 The patient presented to 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 He was deemed to be a good surgical candidate and on 7/6 the patient underwent an uncomplicated OPCABX3 (LIMA-LAD, SVG-OM, RCA (seq)) with Dr. Hernandez. Post operatively he arrived to the CTICU on no gtts and was extubated later on POD0. POD1 he ambulated, was delined, and transferred to floor care. Today is POD2 and the patient remains stable on the floor.      Neurovascular:   - No delirium. Pain well controlled with current regimen.  -Continue tylenol, percocet PRN  - Hx of left posterior inferior cerebellar stroke, continue to monitor neuro exam and continue ASA/plavix     Cardiovascular:   - POD2 s/p MIDCAB (LIMA-LAD, SVG-OM, RCA (seq))  -Hemodynamically stable. HR controlled (67-75)  - Hx of HTN, BP controlled (103//68), on 100 mg losartan at home, resumed 25 mg today will increase as tolerated  - CAD s/p MIDCAB: continue ASA, plavix, atorvastatin 40 mg   - Left subclavian stenosis, BP cuff right arm only     Respiratory:   - 02 Sat = 97% on RA.  - Removed blakes at bedside today, f/u CXR no PTX  -Encourage C+DB and Use of IS 10x / hr while awake.    GI:   - Stable.  -Continue GI PPX with protonix  -PO Diet.    Renal / :  - BUN/Cr stable at 20/1.14  -Continue to monitor renal function.  -Monitor I/O's.  - Replete electrolytes PRN    Endocrine:    -A1c 6.1, hx of diabetes on metformin at home, continue MISS while inpatient   -No known hx of thyroid disease     Hematologic:  -H/H stable at 9.8/30.9 with no obvious signs of bleeding  -Coagulation Panel.    ID:  -Pt remains afebrile with no elevation in WBC or signs of infection  -Continue to monitor CBC  -Observe for SIRS/Sepsis Syndrome.    Prophylaxis:  -DVT prophylaxis with 5000 SubQ Heparin q8h.  -SCD's    Disposition:  -Home when medically appropriate.

## 2020-07-08 NOTE — CHART NOTE - NSCHARTNOTEFT_GEN_A_CORE
Helder Removal:    Pt seen and examined at bedside.  Case discussed with Dr. Hernandez.  Minimal output from blakes.  No air leak appreciated.  Blakes (X2) removed without incident per Dr. Hernandez request.  Occlusive DSD placed.  CXR no obvious PTX noted.  Pt tolerated procedure well.

## 2020-07-08 NOTE — CHART NOTE - NSCHARTNOTEFT_GEN_A_CORE
Admitting Diagnosis:   Patient is a 75y old  Male who presents with a chief complaint of CAD (07 Jul 2020 12:57)      Consult: Yes [   ]  No [   ]    Reason for Initial Nutrition Assessment:      PAST MEDICAL & SURGICAL HISTORY:  Anxiety  Spinal stenosis  HARISH (obstructive sleep apnea)  Cerebrovascular accident (CVA)  DM (diabetes mellitus)  Psoriatic arthritis  Hypertension  H/O vertebral artery stenosis: R vertebral artery stenosis s/p DCA 1/25/20,  S/P laparoscopic cholecystectomy: feb 2014  S/P colonoscopy with polypectomy: 2013 adenoma  Ye's cyst of knee, right: 1977  S/P appendectomy: 1977      Current Nutrition Order:    PO Intake: Good (%) [   ]  Fair (50-75%) [   ] Poor (<25%) [   ]    GI Issues:     Pain:    Skin Integrity:    Labs:   07-08    138  |  102  |  20  ----------------------------<  146<H>  4.2   |  26  |  1.14    Ca    8.6      08 Jul 2020 07:00  Phos  3.0     07-08  Mg     2.3     07-08    TPro  5.4<L>  /  Alb  2.9<L>  /  TBili  0.5  /  DBili  x   /  AST  15  /  ALT  12  /  AlkPhos  56  07-07    CAPILLARY BLOOD GLUCOSE      POCT Blood Glucose.: 138 mg/dL (08 Jul 2020 11:43)  POCT Blood Glucose.: 161 mg/dL (08 Jul 2020 07:18)  POCT Blood Glucose.: 164 mg/dL (07 Jul 2020 21:07)  POCT Blood Glucose.: 108 mg/dL (07 Jul 2020 16:33)    Nutritionally Pertinent Lab Values:    Medications:  MEDICATIONS  (STANDING):  aspirin enteric coated 81 milliGRAM(s) Oral daily  atorvastatin 80 milliGRAM(s) Oral at bedtime  chlorhexidine 2% Cloths 1 Application(s) Topical <User Schedule>  clopidogrel Tablet 75 milliGRAM(s) Oral daily  dextrose 5%. 1000 milliLiter(s) (50 mL/Hr) IV Continuous <Continuous>  dextrose 50% Injectable 12.5 Gram(s) IV Push once  dextrose 50% Injectable 25 Gram(s) IV Push once  dextrose 50% Injectable 25 Gram(s) IV Push once  heparin   Injectable 5000 Unit(s) SubCutaneous every 8 hours  insulin lispro (HumaLOG) corrective regimen sliding scale   SubCutaneous Before meals and at bedtime  pantoprazole    Tablet 40 milliGRAM(s) Oral before breakfast  polyethylene glycol 3350 17 Gram(s) Oral daily  senna 2 Tablet(s) Oral at bedtime  sodium chloride 0.9% lock flush 3 milliLiter(s) IV Push every 8 hours  sodium chloride 0.9%. 1000 milliLiter(s) (10 mL/Hr) IV Continuous <Continuous>    MEDICATIONS  (PRN):  acetaminophen   Tablet .. 650 milliGRAM(s) Oral every 6 hours PRN Mild Pain (1 - 3)  dextrose 40% Gel 15 Gram(s) Oral once PRN Blood Glucose LESS THAN 70 milliGRAM(s)/deciliter  glucagon  Injectable 1 milliGRAM(s) IntraMuscular once PRN Glucose LESS THAN 70 milligrams/deciliter  oxycodone    5 mG/acetaminophen 325 mG 1 Tablet(s) Oral every 4 hours PRN Moderate Pain (4 - 6)  oxycodone    5 mG/acetaminophen 325 mG 2 Tablet(s) Oral every 6 hours PRN Severe Pain (7 - 10)      Admitted Anthropometrics:    Weight:  Daily     Daily     Weight Change:     Nutrition Focused Physical Exam: Completed [   ]  Unable to complete [   ]  Muscle Wasting:  Subcutaneous Fat Wasting:    Estimated energy needs:     Subjective:   76 yo M, former smoker with a PMHx of HTN, HLD, DM2 (on metformin), 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 Pt s/p Cardiac cath 7/1 that revealed severe 3 vessel CAD. Now presents for elective surgery. Now s/p off-pump coronary artery bypass 7/7. OP from L Pleural 125ml 7/7.         Nutrition Diagnosis:    [  ] No active nutrition diagnosis at this time  [  ] Current medical condition precludes nutrition intervention    Goal:    Recommendations:    Education:     Risk Level: High [   ] Moderate [   ] Low [   ] Admitting Diagnosis:   Patient is a 75y old  Male who presents with a chief complaint of CAD (07 Jul 2020 12:57)      Consult: Yes [   ]  No [ x ]    Reason for Initial Nutrition Assessment: LOS       PAST MEDICAL & SURGICAL HISTORY:  Anxiety  Spinal stenosis  HARISH (obstructive sleep apnea)  Cerebrovascular accident (CVA)  DM (diabetes mellitus)  Psoriatic arthritis  Hypertension  H/O vertebral artery stenosis: R vertebral artery stenosis s/p DCA 1/25/20,  S/P laparoscopic cholecystectomy: feb 2014  S/P colonoscopy with polypectomy: 2013 adenoma  Ye's cyst of knee, right: 1977  S/P appendectomy: 1977      Current Nutrition Order: low sodium Consistent Carbohydrate     PO Intake: Good (%) [   ]  Fair (50-75%) [   ] Poor (<25%) [   ]- Please see Below     GI Issues: BM 7/5 per flow sheets, soft, NT    Pain: none per flow sheets     Skin Integrity: 1+ gen edema, no pressure ulcers     Labs:   07-08    138  |  102  |  20  ----------------------------<  146<H>  4.2   |  26  |  1.14    Ca    8.6      08 Jul 2020 07:00  Phos  3.0     07-08  Mg     2.3     07-08    TPro  5.4<L>  /  Alb  2.9<L>  /  TBili  0.5  /  DBili  x   /  AST  15  /  ALT  12  /  AlkPhos  56  07-07    CAPILLARY BLOOD GLUCOSE      POCT Blood Glucose.: 138 mg/dL (08 Jul 2020 11:43)  POCT Blood Glucose.: 161 mg/dL (08 Jul 2020 07:18)  POCT Blood Glucose.: 164 mg/dL (07 Jul 2020 21:07)  POCT Blood Glucose.: 108 mg/dL (07 Jul 2020 16:33)    Nutritionally Pertinent Lab Values:  A1c 6.1%  Lipids reviewed     Medications:  MEDICATIONS  (STANDING):  aspirin enteric coated 81 milliGRAM(s) Oral daily  atorvastatin 80 milliGRAM(s) Oral at bedtime  chlorhexidine 2% Cloths 1 Application(s) Topical <User Schedule>  clopidogrel Tablet 75 milliGRAM(s) Oral daily  dextrose 5%. 1000 milliLiter(s) (50 mL/Hr) IV Continuous <Continuous>  dextrose 50% Injectable 12.5 Gram(s) IV Push once  dextrose 50% Injectable 25 Gram(s) IV Push once  dextrose 50% Injectable 25 Gram(s) IV Push once  heparin   Injectable 5000 Unit(s) SubCutaneous every 8 hours  insulin lispro (HumaLOG) corrective regimen sliding scale   SubCutaneous Before meals and at bedtime  pantoprazole    Tablet 40 milliGRAM(s) Oral before breakfast  polyethylene glycol 3350 17 Gram(s) Oral daily  senna 2 Tablet(s) Oral at bedtime  sodium chloride 0.9% lock flush 3 milliLiter(s) IV Push every 8 hours  sodium chloride 0.9%. 1000 milliLiter(s) (10 mL/Hr) IV Continuous <Continuous>    MEDICATIONS  (PRN):  acetaminophen   Tablet .. 650 milliGRAM(s) Oral every 6 hours PRN Mild Pain (1 - 3)  dextrose 40% Gel 15 Gram(s) Oral once PRN Blood Glucose LESS THAN 70 milliGRAM(s)/deciliter  glucagon  Injectable 1 milliGRAM(s) IntraMuscular once PRN Glucose LESS THAN 70 milligrams/deciliter  oxycodone    5 mG/acetaminophen 325 mG 1 Tablet(s) Oral every 4 hours PRN Moderate Pain (4 - 6)  oxycodone    5 mG/acetaminophen 325 mG 2 Tablet(s) Oral every 6 hours PRN Severe Pain (7 - 10)      Admitted Anthropometrics:  5'11''  pounds +-10%  7/2 207 pounds  BMI 29   %KDM=243    Nutrition Focused Physical Exam: Completed [   ]  Unable to complete [   ] -NA     Estimated energy needs:   IBW used to calculate energy needs due to pt's current body weight exceeding 120% of IBW  Adjusted for age based on current conditions  1950-2340kcal/day 25-30kcal/kg  78-94gm/day 1.0-1.2gm/kg  2340-2730ml/day 30-35ml/kg     Subjective:   76 yo M, former smoker with a PMHx of HTN, HLD, DM2 (on metformin), 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 Pt s/p Cardiac cath 7/1 that revealed severe 3 vessel CAD. Now presents for elective surgery. Now s/p off-pump coronary artery bypass 7/7. OP from L Pleural 125ml 7/7.   Pt seen alert in room. Pt reports losing 50 pounds intentionally PTA by changing diet- reports following a DM diet since August last year which resulted in wt loss. Pt on low sodium Consistent Carbohydrate at this time, reports limited PO intake however 2/2 not liking foods, pt reports placing meal orders however having issues 2/2 not having menu- RD provided menu during visit.   Please see below for nutritions recommendations.     Nutrition Diagnosis: inadequate oral intake RT decreased desire for meals AEB decreased PO intake today   Goal: Pt will meet at least 75% of nutrient needs     Recommendations:  1. Pt on low sodium Consistent Carbohydrate diet at this time.  >> Monitor for %PO intake. If PO intake does not improve now that pt has menu, consider use of oral nutrition supplements.  2. Monitor for diet tolerance.   3. Monitor Labs- monitor BMP, CBC, glucose, lytes, trend renal indices, POCT.  4. Monitor Skin, wts, GI, GOC.  5. RD to remain available for additional nutrition interventions as needed.     Education: Attempted education however limited at this time as pt reports he is aware of DM/low sodium diet. Able to review CHO examples. Discussed A1c.   Risk Level: High [   ] Moderate [  X ] Low [   ]

## 2020-07-09 ENCOUNTER — TRANSCRIPTION ENCOUNTER (OUTPATIENT)
Age: 75
End: 2020-07-09

## 2020-07-09 LAB
ANION GAP SERPL CALC-SCNC: 10 MMOL/L — SIGNIFICANT CHANGE UP (ref 5–17)
BUN SERPL-MCNC: 20 MG/DL — SIGNIFICANT CHANGE UP (ref 7–23)
CALCIUM SERPL-MCNC: 8.3 MG/DL — LOW (ref 8.4–10.5)
CHLORIDE SERPL-SCNC: 99 MMOL/L — SIGNIFICANT CHANGE UP (ref 96–108)
CO2 SERPL-SCNC: 26 MMOL/L — SIGNIFICANT CHANGE UP (ref 22–31)
CREAT SERPL-MCNC: 1.08 MG/DL — SIGNIFICANT CHANGE UP (ref 0.5–1.3)
GLUCOSE BLDC GLUCOMTR-MCNC: 103 MG/DL — HIGH (ref 70–99)
GLUCOSE BLDC GLUCOMTR-MCNC: 135 MG/DL — HIGH (ref 70–99)
GLUCOSE BLDC GLUCOMTR-MCNC: 144 MG/DL — HIGH (ref 70–99)
GLUCOSE BLDC GLUCOMTR-MCNC: 154 MG/DL — HIGH (ref 70–99)
GLUCOSE SERPL-MCNC: 141 MG/DL — HIGH (ref 70–99)
HCT VFR BLD CALC: 32.9 % — LOW (ref 39–50)
HGB BLD-MCNC: 10.4 G/DL — LOW (ref 13–17)
MAGNESIUM SERPL-MCNC: 2 MG/DL — SIGNIFICANT CHANGE UP (ref 1.6–2.6)
MCHC RBC-ENTMCNC: 29.3 PG — SIGNIFICANT CHANGE UP (ref 27–34)
MCHC RBC-ENTMCNC: 31.6 GM/DL — LOW (ref 32–36)
MCV RBC AUTO: 92.7 FL — SIGNIFICANT CHANGE UP (ref 80–100)
NRBC # BLD: 0 /100 WBCS — SIGNIFICANT CHANGE UP (ref 0–0)
PLATELET # BLD AUTO: 256 K/UL — SIGNIFICANT CHANGE UP (ref 150–400)
POTASSIUM SERPL-MCNC: 4.3 MMOL/L — SIGNIFICANT CHANGE UP (ref 3.5–5.3)
POTASSIUM SERPL-SCNC: 4.3 MMOL/L — SIGNIFICANT CHANGE UP (ref 3.5–5.3)
RBC # BLD: 3.55 M/UL — LOW (ref 4.2–5.8)
RBC # FLD: 15.5 % — HIGH (ref 10.3–14.5)
SODIUM SERPL-SCNC: 135 MMOL/L — SIGNIFICANT CHANGE UP (ref 135–145)
WBC # BLD: 12.26 K/UL — HIGH (ref 3.8–10.5)
WBC # FLD AUTO: 12.26 K/UL — HIGH (ref 3.8–10.5)

## 2020-07-09 PROCEDURE — 71046 X-RAY EXAM CHEST 2 VIEWS: CPT | Mod: 26

## 2020-07-09 PROCEDURE — 71045 X-RAY EXAM CHEST 1 VIEW: CPT | Mod: 26,59

## 2020-07-09 RX ORDER — LOSARTAN POTASSIUM 100 MG/1
25 TABLET, FILM COATED ORAL ONCE
Refills: 0 | Status: COMPLETED | OUTPATIENT
Start: 2020-07-09 | End: 2020-07-09

## 2020-07-09 RX ORDER — HYDROCHLOROTHIAZIDE 25 MG
12.5 TABLET ORAL DAILY
Refills: 0 | Status: DISCONTINUED | OUTPATIENT
Start: 2020-07-09 | End: 2020-07-10

## 2020-07-09 RX ORDER — MAGNESIUM OXIDE 400 MG ORAL TABLET 241.3 MG
800 TABLET ORAL ONCE
Refills: 0 | Status: COMPLETED | OUTPATIENT
Start: 2020-07-09 | End: 2020-07-09

## 2020-07-09 RX ORDER — ASCORBIC ACID 60 MG
500 TABLET,CHEWABLE ORAL DAILY
Refills: 0 | Status: DISCONTINUED | OUTPATIENT
Start: 2020-07-09 | End: 2020-07-10

## 2020-07-09 RX ORDER — LOSARTAN POTASSIUM 100 MG/1
50 TABLET, FILM COATED ORAL DAILY
Refills: 0 | Status: DISCONTINUED | OUTPATIENT
Start: 2020-07-10 | End: 2020-07-10

## 2020-07-09 RX ORDER — FOLIC ACID 0.8 MG
1 TABLET ORAL DAILY
Refills: 0 | Status: DISCONTINUED | OUTPATIENT
Start: 2020-07-09 | End: 2020-07-10

## 2020-07-09 RX ADMIN — HEPARIN SODIUM 5000 UNIT(S): 5000 INJECTION INTRAVENOUS; SUBCUTANEOUS at 21:03

## 2020-07-09 RX ADMIN — LOSARTAN POTASSIUM 25 MILLIGRAM(S): 100 TABLET, FILM COATED ORAL at 05:11

## 2020-07-09 RX ADMIN — CHLORHEXIDINE GLUCONATE 1 APPLICATION(S): 213 SOLUTION TOPICAL at 05:13

## 2020-07-09 RX ADMIN — HEPARIN SODIUM 5000 UNIT(S): 5000 INJECTION INTRAVENOUS; SUBCUTANEOUS at 14:46

## 2020-07-09 RX ADMIN — HEPARIN SODIUM 5000 UNIT(S): 5000 INJECTION INTRAVENOUS; SUBCUTANEOUS at 05:11

## 2020-07-09 RX ADMIN — LOSARTAN POTASSIUM 25 MILLIGRAM(S): 100 TABLET, FILM COATED ORAL at 10:37

## 2020-07-09 RX ADMIN — Medication 81 MILLIGRAM(S): at 10:37

## 2020-07-09 RX ADMIN — MAGNESIUM OXIDE 400 MG ORAL TABLET 800 MILLIGRAM(S): 241.3 TABLET ORAL at 09:37

## 2020-07-09 RX ADMIN — CLOPIDOGREL BISULFATE 75 MILLIGRAM(S): 75 TABLET, FILM COATED ORAL at 10:37

## 2020-07-09 RX ADMIN — Medication 12.5 MILLIGRAM(S): at 18:03

## 2020-07-09 RX ADMIN — Medication 1 MILLIGRAM(S): at 14:46

## 2020-07-09 NOTE — DISCHARGE NOTE PROVIDER - NSDCCPTREATMENT_GEN_ALL_CORE_FT
PRINCIPAL PROCEDURE  Procedure: OPCAB (off-pump coronary artery bypass)  Findings and Treatment: LIMA-LAD, SVG-OM-RCA EF nml

## 2020-07-09 NOTE — DISCHARGE NOTE PROVIDER - NSDCFUADDAPPT_GEN_ALL_CORE_FT
-Please follow up with  ___on ___.  The office is located at [facility], [facility], 4th floor. Call us with any questions #[phone]. - Please attend your follow up appointments.

## 2020-07-09 NOTE — DISCHARGE NOTE PROVIDER - NSDCACTIVITY_GEN_ALL_CORE
Stairs allowed/Do not drive or operate machinery/No heavy lifting/straining/Walking - Outdoors allowed/Walking - Indoors allowed/Showering allowed

## 2020-07-09 NOTE — DISCHARGE NOTE PROVIDER - PROVIDER TOKENS
PROVIDER:[TOKEN:[[id number]]],PROVIDER:[TOKEN:[[id number]]] PROVIDER:[TOKEN:[[id number]],SCHEDULEDAPPT:[07/21/2020],SCHEDULEDAPPTTIME:[10:30 AM]],PROVIDER:[TOKEN:[[id number]]] PROVIDER:[TOKEN:[[id number]],SCHEDULEDAPPT:[07/21/2020],SCHEDULEDAPPTTIME:[12:15 PM]],PROVIDER:[TOKEN:[[id number]],SCHEDULEDAPPT:[07/20/2020],SCHEDULEDAPPTTIME:[03:30 PM]]

## 2020-07-09 NOTE — DISCHARGE NOTE PROVIDER - CARE PROVIDERS DIRECT ADDRESSES
[email or web address]

## 2020-07-09 NOTE — DISCHARGE NOTE PROVIDER - NSDCFUSCHEDAPPT_GEN_ALL_CORE_FT
LANRE EDMOND ; 07/21/2020 ; [facility] CT Surg [address] LANRE EDMOND ; 07/20/2020 ; [facility] Cardio Vasc [address]  LANRE EDMOND ; 07/21/2020 ; NP CT Surg [address]

## 2020-07-09 NOTE — DISCHARGE NOTE PROVIDER - CARE PROVIDER_API CALL
Chandler Hernandez  CARDIOVASCULAR SURGERY  [address] [address]  Phone: [phone]  Fax: [phone]  Follow Up Time:     Nancy Rush  CARDIOVASCULAR DISEASE  [address] [place] [address]  Phone: [phone]  Fax: [phone]  Follow Up Time: Chandler Hernandez  CARDIOVASCULAR SURGERY  [address] [address]  Phone: [phone]  Fax: [phone]  Scheduled Appointment: 07/21/2020 10:30 AM    Nancy Rush  CARDIOVASCULAR DISEASE  [address] [place] [address]  Phone: [phone]  Fax: [phone]  Follow Up Time: Chandler Hernandez  CARDIOVASCULAR SURGERY  [address] [address]  Phone: [phone]  Fax: [phone]  Scheduled Appointment: 07/21/2020 10:30 AM    Nancy Rush  CARDIOVASCULAR DISEASE  [address] [place] [address]  Phone: [phone]  Fax: [phone]  Follow Up Time: Chandler Hernandez  CARDIOVASCULAR SURGERY  [address] [address]  Phone: [phone]  Fax: [phone]  Scheduled Appointment: 07/21/2020 12:15 PM    Nancy Rush  CARDIOVASCULAR DISEASE  [address] [place] [address]  Phone: [phone]  Fax: [phone]  Scheduled Appointment: 07/20/2020 03:30 PM

## 2020-07-09 NOTE — DISCHARGE NOTE PROVIDER - NSDCFUADDINST_GEN_ALL_CORE_FT
-Walk daily as tolerated and use your incentive spirometer every hour.    -No driving or strenuous activity/exercise for 6 weeks, or until cleared by your surgeon.    -You may shower.  Gently clean your incisions with anti-bacterial soap and water, pat dry.  You may leave them open to air.    -Call your doctor if you have shortness of breath, chest pain not relieved by pain medication, dizziness, fever >101.5, or increased redness or drainage from incisions. -Walk daily as tolerated and use your incentive spirometer every hour.    -No driving or strenuous activity/exercise for 6 weeks, or until cleared by your surgeon.    -You may shower.  Gently clean your incisions with anti-bacterial soap and water, pat dry.  You may leave them open to air.    -Call your doctor if you have shortness of breath, chest pain not relieved by pain medication, dizziness, fever >101.5, or increased redness or drainage from incisions.    -Please ensure your follow up with your PCP to recheck your kidney function.

## 2020-07-09 NOTE — DISCHARGE NOTE PROVIDER - NSDCMRMEDTOKEN_GEN_ALL_CORE_FT
Ambien 5 mg oral tablet: 1 tab(s) orally once a day (at bedtime), As Needed insomnia  ascorbic acid 500 mg oral tablet: 1 tab(s) orally once a day  aspirin 81 mg oral delayed release tablet: 1 tab(s) orally once a day  atorvastatin 80 mg oral tablet: 1 tab(s) orally once a day (at bedtime)  azelastine 137 mcg/inh (0.1%) nasal spray: 2 spray(s) nasal 2 times a day  clopidogrel 75 mg oral tablet: 1 tab(s) orally every 24 hours  Cosentyx: 300 milligram(s) subcutaneous every 4 weeks  famotidine 40 mg oral tablet: 1 tab(s) orally once a day (at bedtime)  Flonase 50 mcg/inh nasal spray: 1 spray(s) nasal once a day  folic acid 1 mg oral tablet: 1 tab(s) orally once a day  hydroCHLOROthiazide 25 mg oral tablet: 1 tab(s) orally once a day  LORazepam 1 mg oral tablet: 1 milligram(s) orally 2 times a day, As Needed anxiety  losartan 100 mg oral tablet: 1 tab(s) orally once a day  meloxicam 7.5 mg oral tablet: 1 tab(s) orally once a day  metFORMIN 500 mg oral tablet: 1 tab(s) orally once a day  methotrexate 20 mg injection: injectable once a month  Multiple Vitamins oral tablet: 1 tab(s) orally once a day Ambien 5 mg oral tablet: 1 tab(s) orally once a day (at bedtime), As Needed insomnia  ascorbic acid 500 mg oral tablet: 1 tab(s) orally once a day  azelastine 137 mcg/inh (0.1%) nasal spray: 2 spray(s) nasal 2 times a day  Cosentyx: 300 milligram(s) subcutaneous every 4 weeks  Flonase 50 mcg/inh nasal spray: 1 spray(s) nasal once a day  folic acid 1 mg oral tablet: 1 tab(s) orally once a day  methotrexate 20 mg injection: injectable once a month  Multiple Vitamins oral tablet: 1 tab(s) orally once a day acetaminophen 325 mg oral tablet: 2 tab(s) orally every 6 hours, As needed, Mild Pain (1 - 3) MDD:8 tabs  Ambien 5 mg oral tablet: 1 tab(s) orally once a day (at bedtime), As Needed insomnia  ascorbic acid 500 mg oral tablet: 1 tab(s) orally once a day  aspirin 81 mg oral delayed release tablet: 1 tab(s) orally once a day  atorvastatin 80 mg oral tablet: 1 tab(s) orally once a day (at bedtime)  azelastine 137 mcg/inh (0.1%) nasal spray: 2 spray(s) nasal 2 times a day  clopidogrel 75 mg oral tablet: 1 tab(s) orally every 24 hours  Cosentyx: 300 milligram(s) subcutaneous every 4 weeks  famotidine 40 mg oral tablet: 1 tab(s) orally once a day (at bedtime)  Flonase 50 mcg/inh nasal spray: 1 spray(s) nasal once a day  folic acid 1 mg oral tablet: 1 tab(s) orally once a day  metFORMIN 500 mg oral tablet: 1 tab(s) orally once a day   methotrexate 20 mg injection: injectable once a month  metoprolol tartrate 25 mg oral tablet: 0.5 tab(s) orally every 12 hours   Multiple Vitamins oral tablet: 1 tab(s) orally once a day  oxycodone-acetaminophen 5 mg-325 mg oral tablet: 1 tab(s) orally every 6 hours, As Needed -Severe Pain (7 - 10) MDD:4 tablets  polyethylene glycol 3350 oral powder for reconstitution: 17 gram(s) orally once a day, As Needed

## 2020-07-09 NOTE — DISCHARGE NOTE PROVIDER - HOSPITAL COURSE
This is a 75 y.o male, former smoker, with a PMHx of HTN, HLD, DM2 (on metformin), L posterior inferior cerebellar artery stroke (11/2019, no residual deficits, on ASA/plavix), R vertebral artery stenosis (s/p DCA 1/25/20), severe aortic arch plaque, and HARISH (does not use CPAP) who originally presented to his cardiologist for cardiac clearance for R hip surgery and was found to have 3 V CAD. On 7/6 the patient underwent an uncomplicated OPCABX3 (LIMA-LAD, SVG-OM sequential RCA, EF normal), with Dr. Hernandez. He arrived to ICU stable, on no infusions, and was extubated later that day. POD1 he was transferred to the stepdown floor.  He has remained stable, tolerating BB, and ambulating independently on room air.  He is clinically stable to be discharged home on POD....         CABG    Aspirin               [  ] Yes  [  ] Contraindicated, Reason_______________________________    Beta-Blocker     [  ] Yes  [  ]Contraindicated, Reason_______________________________    Statin                 [  ] Yes  [  ] Contraindicated, Reason_______________________________ This is a 75 y.o male, former smoker, with a PMHx of HTN, HLD, DM2 (on metformin), L posterior inferior cerebellar artery stroke (11/2019, no residual deficits, on ASA/plavix), R vertebral artery stenosis (s/p DCA 1/25/20), severe aortic arch plaque, and HARISH (does not use CPAP) who originally presented to his cardiologist for cardiac clearance for R hip surgery and was found to have 3 V CAD. On 7/6 the patient underwent an uncomplicated OPCABX3 (LIMA-LAD, SVG-OM sequential RCA, EF normal), with Dr. Hernandez. He arrived to ICU stable, on no infusions, and was extubated later that day. POD1 he was transferred to the stepdown floor.  He has remained stable, tolerating BB, and ambulating independently on room air.  On PDO#4, pt noted to have increase in Cr (1.35) when baseline is 1.1. Pt states that his Cr has been known to become elevated at times while outpatient but could not give an exact number. Patient adamantly refusing lab work work on the day of discharge but agreed to gental hydration. He is clinically stable to be discharged home on POD#4 after gentle hydration Dr. Gtz agreed with this plan. Per Dr. Hernandez, pt is medically ready to be discharged home with home lab draw on Monday to recheck creatinine.         CABG    Aspirin               [ X ] Yes  [  ] Contraindicated, Reason_______________________________    Beta-Blocker     [  ] Yes  [  X]Contraindicated, Reason_______________________________    Statin                 [  X] Yes  [  ] Contraindicated, Reason_______________________________ This is a 75 y.o male, former smoker, with a PMHx of HTN, HLD, DM2 (on metformin), L posterior inferior cerebellar artery stroke (11/2019, no residual deficits, on ASA/plavix), R vertebral artery stenosis (s/p DCA 1/25/20), severe aortic arch plaque, and HARISH (does not use CPAP) who originally presented to his cardiologist for cardiac clearance for R hip surgery and was found to have 3 V CAD. On 7/6 the patient underwent an uncomplicated OPCABX3 (LIMA-LAD, SVG-OM sequential RCA, EF normal), with Dr. Hernandez. He arrived to ICU stable, on no infusions, and was extubated later that day. POD1 he was transferred to the stepdown floor.  He has remained stable, tolerating BB, and ambulating independently on room air.  On PDO#4, pt noted to have increase in Cr (1.35) when baseline is 1.1. Pt states that his Cr has been known to become elevated at times while outpatient but could not give an exact number. Patient adamantly refusing lab work work on the day of discharge but agreed to gental hydration. He is clinically stable to be discharged home on POD#4 after gentle hydration Dr. Gtz agreed with this plan. Per Dr. Hernandez, pt is medically ready to be discharged home with close follow up with primary care to recheck this creatinine.         CABG    Aspirin               [ X ] Yes  [  ] Contraindicated, Reason_______________________________    Beta-Blocker     [ x ] Yes  [ ]Contraindicated, Reason_______________________________    Statin                 [  X] Yes  [  ] Contraindicated, Reason_______________________________ This is a 74 y/o male, former smoker, with a PMHx of HTN, HLD, DM2 (on metformin), L posterior inferior cerebellar artery stroke (11/2019, no residual deficits, on ASA/plavix), R vertebral artery stenosis (s/p DCA 1/25/20), severe aortic arch plaque, and HARISH (does not use CPAP) who originally presented to his cardiologist for cardiac clearance for R hip surgery and was found to have 3 V CAD. On 7/6 the patient underwent an uncomplicated OPCABX3 (LIMA-LAD, SVG-OM sequential RCA, EF normal), with Dr. Hernandez. He arrived to ICU stable, on no infusions, and was extubated later that day. POD1 he was transferred to the stepdown floor.  He has remained stable, tolerating BB, and ambulating independently on room air.  On PDO#4, pt noted to have increase in Cr (1.35) when baseline is 1.1. Pt states that his Cr has been known to become elevated at times while outpatient but could not give an exact number. Patient adamantly refusing lab work on the day of discharge but agreed to IV hydration. He is clinically stable to be discharged home on POD#4 after gentle hydration Dr. Gtz agreed with this plan. Per Dr. Hernandez, pt is medically ready to be discharged home with close follow up with primary care to recheck this creatinine.         CABG    Aspirin               [ X ] Yes  [  ] Contraindicated, Reason_______________________________    Beta-Blocker     [ x ] Yes  [ ]Contraindicated, Reason_______________________________    Statin                 [  X] Yes  [  ] Contraindicated, Reason_______________________________        Of note: On Day of discharge, patient medications were switched from losartan and HTCZ to BB per Dr. Gtz request. This is a 74 y/o male, former smoker, with a PMHx of HTN, HLD, DM2 (on metformin), L posterior inferior cerebellar artery stroke (11/2019, no residual deficits, on ASA/plavix), R vertebral artery stenosis (s/p DCA 1/25/20), severe aortic arch plaque, and HARISH (does not use CPAP) who originally presented to his cardiologist for cardiac clearance for R hip surgery and was found to have 3 V CAD. On 7/6 the patient underwent an uncomplicated OPCABX3 (LIMA-LAD, SVG-OM sequential RCA, EF normal), with Dr. Hernandez. He arrived to ICU stable, on no infusions, and was extubated later that day. POD1 he was transferred to the stepdown floor.  He has remained stable, tolerating BB, and ambulating independently on room air.  On PDO#4, pt noted to have increase in Cr (1.35) when baseline is 1.1. Pt states that his Cr has been known to become elevated at times while outpatient but could not give an exact number. Patient adamantly refusing lab work on the day of discharge but agreed to IV hydration. He is clinically stable to be discharged home on POD#4 after gentle hydration Dr. Gtz agreed with this plan. Per Dr. Hernandez, pt is medically ready to be discharged home with close follow up with primary care to recheck this creatinine.         CABG    Aspirin               [ X ] Yes  [  ] Contraindicated, Reason_______________________________    Beta-Blocker     [ x ] Yes  [ ]Contraindicated, Reason_______________________________    Statin                 [  X] Yes  [  ] Contraindicated, Reason_______________________________        Of note: On Day of discharge, patient medications were switched from losartan and HTCZ to Lopressor 12.5 BID per Dr. Gtz request.

## 2020-07-09 NOTE — PROGRESS NOTE ADULT - SUBJECTIVE AND OBJECTIVE BOX
Patient discussed on morning rounds with Dr. Hernandez    Operation / Date: 7/6/20 OPCAB X3 (LIMA-LAD, SVG-OM-RCA (seq)), EF normal    SUBJECTIVE ASSESSMENT:  75y Male assessed at bedside this morning and during ambulation. The patient states he feels better today although still has some pain and feels tired. He has not had a BM yet but is passing flatus and feels bloated (amenable to suppository). He denies chest pain or SOB, feels fine during ambulation. He is using IS (1250 cc).     Vital Signs Last 24 Hrs  T(C): 36.1 (09 Jul 2020 09:36), Max: 36.8 (09 Jul 2020 02:00)  T(F): 97 (09 Jul 2020 09:36), Max: 98.3 (09 Jul 2020 02:00)  HR: 80 (09 Jul 2020 10:45) (76 - 82)  BP: 116/53 (09 Jul 2020 10:45) (110/56 - 159/67)  BP(mean): 76 (09 Jul 2020 10:45) (75 - 97)  RR: 16 (09 Jul 2020 05:00) (15 - 18)  SpO2: 94% (09 Jul 2020 05:00) (94% - 99%)  I&O's Detail    08 Jul 2020 07:01  -  09 Jul 2020 07:00  --------------------------------------------------------  IN:    Oral Fluid: 200 mL  Total IN: 200 mL    OUT:    Voided: 1800 mL  Total OUT: 1800 mL    Total NET: -1600 mL    CHEST TUBE:  No  SANDRA DRAIN:  No.  EPICARDIAL WIRES: Yes  TIE DOWNS: Yes  ROD: No.    Physical Exam  CONSTITUTIONAL: Well appearing in NAD assessed laying comfortably in bed   NEURO: No focal deficits noted, moving bilateral upper and lower extremities                    CV: RRR, no murmurs, rubs, gallops  RESPIRATORY: Clear to auscultation bilateral posterior lung fields, no wheezes, rales, rhonchi   GI: +BS, NT/ND  MUSKULOSKELETAL: No peripheral edema or calf tenderness. Full strength and ROM bilateral upper and lower extremities   VASCULAR: Bilateral distal pulses 2+  INCISIONS: MSI clean, dry, intact, no sternal click. R LE SVG harvest site clean, dry, intact.     LABS:                        10.4   12.26 )-----------( 256      ( 09 Jul 2020 05:50 )             32.9       COUMADIN:  Yes/No. REASON: .        07-09    135  |  99  |  20  ----------------------------<  141<H>  4.3   |  26  |  1.08    Ca    8.3<L>      09 Jul 2020 05:50  Phos  3.0     07-08  Mg     2.0     07-09      MEDICATIONS  (STANDING):  aspirin enteric coated 81 milliGRAM(s) Oral daily  atorvastatin 80 milliGRAM(s) Oral at bedtime  chlorhexidine 2% Cloths 1 Application(s) Topical <User Schedule>  clopidogrel Tablet 75 milliGRAM(s) Oral daily  dextrose 5%. 1000 milliLiter(s) (50 mL/Hr) IV Continuous <Continuous>  dextrose 50% Injectable 12.5 Gram(s) IV Push once  dextrose 50% Injectable 25 Gram(s) IV Push once  dextrose 50% Injectable 25 Gram(s) IV Push once  heparin   Injectable 5000 Unit(s) SubCutaneous every 8 hours  insulin lispro (HumaLOG) corrective regimen sliding scale   SubCutaneous Before meals and at bedtime  pantoprazole    Tablet 40 milliGRAM(s) Oral before breakfast  polyethylene glycol 3350 17 Gram(s) Oral daily  senna 2 Tablet(s) Oral at bedtime  sodium chloride 0.9% lock flush 3 milliLiter(s) IV Push every 8 hours  sodium chloride 0.9%. 1000 milliLiter(s) (10 mL/Hr) IV Continuous <Continuous>    MEDICATIONS  (PRN):  acetaminophen   Tablet .. 650 milliGRAM(s) Oral every 6 hours PRN Mild Pain (1 - 3)  dextrose 40% Gel 15 Gram(s) Oral once PRN Blood Glucose LESS THAN 70 milliGRAM(s)/deciliter  glucagon  Injectable 1 milliGRAM(s) IntraMuscular once PRN Glucose LESS THAN 70 milligrams/deciliter  oxycodone    5 mG/acetaminophen 325 mG 1 Tablet(s) Oral every 4 hours PRN Moderate Pain (4 - 6)  oxycodone    5 mG/acetaminophen 325 mG 2 Tablet(s) Oral every 6 hours PRN Severe Pain (7 - 10)    RADIOLOGY & ADDITIONAL TESTS:  < from: Xray Chest 1 View- PORTABLE-Routine (07.09.20 @ 05:49) >  INTERPRETATION:  Chest x-ray    Indication: Post CABG    A portable frontal view of the chest is compared to the prior study dated 7/8/2020. Right IJ line has been removed. Heart size is enlarged. Possible trace pleural effusions. No new consolidation. No pneumothorax.      IMPRESSION: Removal of right IJ line, otherwise no significant change.    A/P:    Mr. 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 He was deemed to be a good surgical candidate and on 7/6 the patient underwent an uncomplicated OPCABX3 (LIMA-LAD, SVG-OM, RCA (seq)) with Dr. Hernandez. Post operatively he arrived to the CTICU on no gtts and was extubated later on POD0. POD1 he ambulated, was delined, and transferred to floor care. POD2 the patient's sandra was removed. Today, POD3 patient remains stable on the floor, titrating BP meds.      Neurovascular:   - No delirium. Pain well controlled with current regimen.  -Continue tylenol, percocet PRN  - Hx of left posterior inferior cerebellar stroke, continue to monitor neuro exam and continue ASA/plavix     Cardiovascular:   - POD3 s/p MIDCAB (LIMA-LAD, SVG-OM, RCA (seq))  -Hemodynamically stable. HR controlled (70-82)  - Hx of HTN, BP controlled (100//67), on 100 mg losartan and 25 mg hydrochlorothiazide at home, resuming as tolerated   - CAD s/p MIDCAB: continue ASA, plavix, atorvastatin 80 mg   - Left subclavian stenosis, BP cuff right arm only     Respiratory:   - 02 Sat = 97% on RA.  - Removed blakes at bedside today, f/u CXR no PTX  -Encourage C+DB and Use of IS 10x / hr while awake.    GI:   - Stable.  -Continue GI PPX with protonix  -PO Diet.    Renal / :  - BUN/Cr stable at 20/1.14  -Continue to monitor renal function.  -Monitor I/O's.  - Replete electrolytes PRN    Endocrine:    -A1c 6.1, hx of diabetes on metformin at home, continue MISS while inpatient   -No known hx of thyroid disease     Hematologic:  -H/H stable at 9.8/30.9 with no obvious signs of bleeding  -Coagulation Panel.    ID:  -Pt remains afebrile with no elevation in WBC or signs of infection  -Continue to monitor CBC  -Observe for SIRS/Sepsis Syndrome.    Prophylaxis:  -DVT prophylaxis with 5000 SubQ Heparin q8h.  -SCD's    Disposition:  -Home when medically appropriate. Patient discussed on morning rounds with Dr. Hernandez    Operation / Date: 7/6/20 OPCAB X3 (LIMA-LAD, SVG-OM-RCA (seq)), EF normal    SUBJECTIVE ASSESSMENT:  75y Male assessed at bedside this morning and during ambulation. The patient states he feels better today although still has some pain and feels tired. He has not had a BM yet but is passing flatus and feels bloated (amenable to suppository). He denies chest pain or SOB, feels fine during ambulation. He is using IS (1250 cc).     Vital Signs Last 24 Hrs  T(C): 36.1 (09 Jul 2020 09:36), Max: 36.8 (09 Jul 2020 02:00)  T(F): 97 (09 Jul 2020 09:36), Max: 98.3 (09 Jul 2020 02:00)  HR: 80 (09 Jul 2020 10:45) (76 - 82)  BP: 116/53 (09 Jul 2020 10:45) (110/56 - 159/67)  BP(mean): 76 (09 Jul 2020 10:45) (75 - 97)  RR: 16 (09 Jul 2020 05:00) (15 - 18)  SpO2: 94% (09 Jul 2020 05:00) (94% - 99%)  I&O's Detail    08 Jul 2020 07:01  -  09 Jul 2020 07:00  --------------------------------------------------------  IN:    Oral Fluid: 200 mL  Total IN: 200 mL    OUT:    Voided: 1800 mL  Total OUT: 1800 mL    Total NET: -1600 mL    CHEST TUBE:  No  SANDRA DRAIN:  No.  EPICARDIAL WIRES: Yes  TIE DOWNS: Yes  ROD: No.    Physical Exam  CONSTITUTIONAL: Well appearing in NAD assessed laying comfortably in bed   NEURO: No focal deficits noted, moving bilateral upper and lower extremities                    CV: RRR, no murmurs, rubs, gallops  RESPIRATORY: Clear to auscultation bilateral posterior lung fields, no wheezes, rales, rhonchi   GI: +BS, NT/ND  MUSKULOSKELETAL: No peripheral edema or calf tenderness. Full strength and ROM bilateral upper and lower extremities   VASCULAR: Bilateral distal pulses 2+  INCISIONS: MSI clean, dry, intact, no sternal click. R LE SVG harvest site clean, dry, intact.     LABS:                        10.4   12.26 )-----------( 256      ( 09 Jul 2020 05:50 )             32.9       COUMADIN:  No.         07-09    135  |  99  |  20  ----------------------------<  141<H>  4.3   |  26  |  1.08    Ca    8.3<L>      09 Jul 2020 05:50  Phos  3.0     07-08  Mg     2.0     07-09      MEDICATIONS  (STANDING):  aspirin enteric coated 81 milliGRAM(s) Oral daily  atorvastatin 80 milliGRAM(s) Oral at bedtime  chlorhexidine 2% Cloths 1 Application(s) Topical <User Schedule>  clopidogrel Tablet 75 milliGRAM(s) Oral daily  dextrose 5%. 1000 milliLiter(s) (50 mL/Hr) IV Continuous <Continuous>  dextrose 50% Injectable 12.5 Gram(s) IV Push once  dextrose 50% Injectable 25 Gram(s) IV Push once  dextrose 50% Injectable 25 Gram(s) IV Push once  heparin   Injectable 5000 Unit(s) SubCutaneous every 8 hours  insulin lispro (HumaLOG) corrective regimen sliding scale   SubCutaneous Before meals and at bedtime  pantoprazole    Tablet 40 milliGRAM(s) Oral before breakfast  polyethylene glycol 3350 17 Gram(s) Oral daily  senna 2 Tablet(s) Oral at bedtime  sodium chloride 0.9% lock flush 3 milliLiter(s) IV Push every 8 hours  sodium chloride 0.9%. 1000 milliLiter(s) (10 mL/Hr) IV Continuous <Continuous>    MEDICATIONS  (PRN):  acetaminophen   Tablet .. 650 milliGRAM(s) Oral every 6 hours PRN Mild Pain (1 - 3)  dextrose 40% Gel 15 Gram(s) Oral once PRN Blood Glucose LESS THAN 70 milliGRAM(s)/deciliter  glucagon  Injectable 1 milliGRAM(s) IntraMuscular once PRN Glucose LESS THAN 70 milligrams/deciliter  oxycodone    5 mG/acetaminophen 325 mG 1 Tablet(s) Oral every 4 hours PRN Moderate Pain (4 - 6)  oxycodone    5 mG/acetaminophen 325 mG 2 Tablet(s) Oral every 6 hours PRN Severe Pain (7 - 10)    RADIOLOGY & ADDITIONAL TESTS:  < from: Xray Chest 1 View- PORTABLE-Routine (07.09.20 @ 05:49) >  INTERPRETATION:  Chest x-ray    Indication: Post CABG    A portable frontal view of the chest is compared to the prior study dated 7/8/2020. Right IJ line has been removed. Heart size is enlarged. Possible trace pleural effusions. No new consolidation. No pneumothorax.      IMPRESSION: Removal of right IJ line, otherwise no significant change.    A/P:    Mr. 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 He was deemed to be a good surgical candidate and on 7/6 the patient underwent an uncomplicated OPCABX3 (LIMA-LAD, SVG-OM, RCA (seq)) with Dr. Hernandez. Post operatively he arrived to the CTICU on no gtts and was extubated later on POD0. POD1 he ambulated, was delined, and transferred to floor care. POD2 the patient's sandra was removed. Today, POD3 patient remains stable on the floor, titrating BP meds.      Neurovascular:   - No delirium. Pain well controlled with current regimen.  -Continue tylenol, percocet PRN  - Hx of left posterior inferior cerebellar stroke, continue to monitor neuro exam and continue ASA/plavix     Cardiovascular:   - POD3 s/p MIDCAB (LIMA-LAD, SVG-OM, RCA (seq))  -Hemodynamically stable. HR controlled (70-82)  - Hx of HTN, BP controlled (100//67), on 100 mg losartan and 25 mg hydrochlorothiazide at home, resuming as tolerated   - CAD s/p MIDCAB: continue ASA, plavix, atorvastatin 80 mg   - Left subclavian stenosis, BP cuff right arm only     Respiratory:   - 02 Sat = 97% on RA.  - Removed blakes at bedside today, f/u CXR no PTX  -Encourage C+DB and Use of IS 10x / hr while awake.    GI:   - Stable.  -Continue GI PPX with protonix  -PO Diet.    Renal / :  - BUN/Cr stable at 20/1.08  -Continue to monitor renal function.  -Monitor I/O's.  - Replete electrolytes PRN    Endocrine:    -A1c 6.1, hx of diabetes on metformin at home, continue MISS while inpatient   -No known hx of thyroid disease     Hematologic:  -H/H stable at 10.4/32.9 with no obvious signs of bleeding  -Coagulation Panel.    ID:  -Pt remains afebrile with no elevation in WBC or signs of infection  -Continue to monitor CBC  -Observe for SIRS/Sepsis Syndrome.    Prophylaxis:  -DVT prophylaxis with 5000 SubQ Heparin q8h.  -SCD's    Disposition:  -Home when medically appropriate.

## 2020-07-10 ENCOUNTER — TRANSCRIPTION ENCOUNTER (OUTPATIENT)
Age: 75
End: 2020-07-10

## 2020-07-10 VITALS — TEMPERATURE: 96 F

## 2020-07-10 LAB
ANION GAP SERPL CALC-SCNC: 8 MMOL/L — SIGNIFICANT CHANGE UP (ref 5–17)
BUN SERPL-MCNC: 23 MG/DL — SIGNIFICANT CHANGE UP (ref 7–23)
CALCIUM SERPL-MCNC: 8.7 MG/DL — SIGNIFICANT CHANGE UP (ref 8.4–10.5)
CHLORIDE SERPL-SCNC: 103 MMOL/L — SIGNIFICANT CHANGE UP (ref 96–108)
CO2 SERPL-SCNC: 27 MMOL/L — SIGNIFICANT CHANGE UP (ref 22–31)
CREAT SERPL-MCNC: 1.35 MG/DL — HIGH (ref 0.5–1.3)
GLUCOSE BLDC GLUCOMTR-MCNC: 138 MG/DL — HIGH (ref 70–99)
GLUCOSE SERPL-MCNC: 141 MG/DL — HIGH (ref 70–99)
HCT VFR BLD CALC: 30.8 % — LOW (ref 39–50)
HGB BLD-MCNC: 10 G/DL — LOW (ref 13–17)
MAGNESIUM SERPL-MCNC: 2 MG/DL — SIGNIFICANT CHANGE UP (ref 1.6–2.6)
MCHC RBC-ENTMCNC: 30.1 PG — SIGNIFICANT CHANGE UP (ref 27–34)
MCHC RBC-ENTMCNC: 32.5 GM/DL — SIGNIFICANT CHANGE UP (ref 32–36)
MCV RBC AUTO: 92.8 FL — SIGNIFICANT CHANGE UP (ref 80–100)
NRBC # BLD: 0 /100 WBCS — SIGNIFICANT CHANGE UP (ref 0–0)
PLATELET # BLD AUTO: 288 K/UL — SIGNIFICANT CHANGE UP (ref 150–400)
POTASSIUM SERPL-MCNC: 4.4 MMOL/L — SIGNIFICANT CHANGE UP (ref 3.5–5.3)
POTASSIUM SERPL-SCNC: 4.4 MMOL/L — SIGNIFICANT CHANGE UP (ref 3.5–5.3)
RBC # BLD: 3.32 M/UL — LOW (ref 4.2–5.8)
RBC # FLD: 15.5 % — HIGH (ref 10.3–14.5)
SODIUM SERPL-SCNC: 138 MMOL/L — SIGNIFICANT CHANGE UP (ref 135–145)
TSH SERPL-MCNC: 3 UIU/ML — SIGNIFICANT CHANGE UP (ref 0.35–4.94)
WBC # BLD: 10.93 K/UL — HIGH (ref 3.8–10.5)
WBC # FLD AUTO: 10.93 K/UL — HIGH (ref 3.8–10.5)

## 2020-07-10 PROCEDURE — P9047: CPT

## 2020-07-10 PROCEDURE — 71046 X-RAY EXAM CHEST 2 VIEWS: CPT

## 2020-07-10 PROCEDURE — 80053 COMPREHEN METABOLIC PANEL: CPT

## 2020-07-10 PROCEDURE — 86923 COMPATIBILITY TEST ELECTRIC: CPT

## 2020-07-10 PROCEDURE — 71045 X-RAY EXAM CHEST 1 VIEW: CPT

## 2020-07-10 PROCEDURE — 84100 ASSAY OF PHOSPHORUS: CPT

## 2020-07-10 PROCEDURE — 82803 BLOOD GASES ANY COMBINATION: CPT

## 2020-07-10 PROCEDURE — 86850 RBC ANTIBODY SCREEN: CPT

## 2020-07-10 PROCEDURE — 85610 PROTHROMBIN TIME: CPT

## 2020-07-10 PROCEDURE — P9016: CPT

## 2020-07-10 PROCEDURE — 84132 ASSAY OF SERUM POTASSIUM: CPT

## 2020-07-10 PROCEDURE — 85027 COMPLETE CBC AUTOMATED: CPT

## 2020-07-10 PROCEDURE — 80048 BASIC METABOLIC PNL TOTAL CA: CPT

## 2020-07-10 PROCEDURE — 83735 ASSAY OF MAGNESIUM: CPT

## 2020-07-10 PROCEDURE — 84443 ASSAY THYROID STIM HORMONE: CPT

## 2020-07-10 PROCEDURE — 36415 COLL VENOUS BLD VENIPUNCTURE: CPT

## 2020-07-10 PROCEDURE — 84295 ASSAY OF SERUM SODIUM: CPT

## 2020-07-10 PROCEDURE — 94002 VENT MGMT INPAT INIT DAY: CPT

## 2020-07-10 PROCEDURE — 85025 COMPLETE CBC W/AUTO DIFF WBC: CPT

## 2020-07-10 PROCEDURE — 83605 ASSAY OF LACTIC ACID: CPT

## 2020-07-10 PROCEDURE — C1889: CPT

## 2020-07-10 PROCEDURE — 85730 THROMBOPLASTIN TIME PARTIAL: CPT

## 2020-07-10 PROCEDURE — C1894: CPT

## 2020-07-10 PROCEDURE — 36430 TRANSFUSION BLD/BLD COMPNT: CPT

## 2020-07-10 PROCEDURE — P9045: CPT

## 2020-07-10 PROCEDURE — 86901 BLOOD TYPING SEROLOGIC RH(D): CPT

## 2020-07-10 PROCEDURE — 82962 GLUCOSE BLOOD TEST: CPT

## 2020-07-10 PROCEDURE — 97161 PT EVAL LOW COMPLEX 20 MIN: CPT

## 2020-07-10 PROCEDURE — 82330 ASSAY OF CALCIUM: CPT

## 2020-07-10 RX ORDER — METFORMIN HYDROCHLORIDE 850 MG/1
1 TABLET ORAL
Qty: 0 | Refills: 0 | DISCHARGE

## 2020-07-10 RX ORDER — MELOXICAM 15 MG/1
1 TABLET ORAL
Qty: 0 | Refills: 0 | DISCHARGE

## 2020-07-10 RX ORDER — METOPROLOL TARTRATE 50 MG
0.5 TABLET ORAL
Qty: 30 | Refills: 0
Start: 2020-07-10 | End: 2020-08-08

## 2020-07-10 RX ORDER — ATORVASTATIN CALCIUM 80 MG/1
1 TABLET, FILM COATED ORAL
Qty: 30 | Refills: 0
Start: 2020-07-10 | End: 2020-08-08

## 2020-07-10 RX ORDER — POLYETHYLENE GLYCOL 3350 17 G/17G
17 POWDER, FOR SOLUTION ORAL
Qty: 238 | Refills: 0
Start: 2020-07-10 | End: 2020-07-23

## 2020-07-10 RX ORDER — FAMOTIDINE 10 MG/ML
1 INJECTION INTRAVENOUS
Qty: 0 | Refills: 0 | DISCHARGE

## 2020-07-10 RX ORDER — FAMOTIDINE 10 MG/ML
1 INJECTION INTRAVENOUS
Qty: 30 | Refills: 0
Start: 2020-07-10 | End: 2020-08-08

## 2020-07-10 RX ORDER — METFORMIN HYDROCHLORIDE 850 MG/1
1 TABLET ORAL
Qty: 30 | Refills: 0
Start: 2020-07-10 | End: 2020-08-08

## 2020-07-10 RX ORDER — CLOPIDOGREL BISULFATE 75 MG/1
1 TABLET, FILM COATED ORAL
Qty: 30 | Refills: 0
Start: 2020-07-10 | End: 2020-08-08

## 2020-07-10 RX ORDER — ASPIRIN/CALCIUM CARB/MAGNESIUM 324 MG
1 TABLET ORAL
Qty: 30 | Refills: 0
Start: 2020-07-10 | End: 2020-08-08

## 2020-07-10 RX ORDER — LOSARTAN POTASSIUM 100 MG/1
1 TABLET, FILM COATED ORAL
Qty: 30 | Refills: 0
Start: 2020-07-10 | End: 2020-08-08

## 2020-07-10 RX ORDER — SODIUM CHLORIDE 9 MG/ML
1000 INJECTION, SOLUTION INTRAVENOUS
Refills: 0 | Status: DISCONTINUED | OUTPATIENT
Start: 2020-07-10 | End: 2020-07-10

## 2020-07-10 RX ORDER — LOSARTAN POTASSIUM 100 MG/1
1 TABLET, FILM COATED ORAL
Qty: 0 | Refills: 0 | DISCHARGE

## 2020-07-10 RX ORDER — ACETAMINOPHEN 500 MG
2 TABLET ORAL
Qty: 112 | Refills: 0
Start: 2020-07-10 | End: 2020-07-23

## 2020-07-10 RX ADMIN — CLOPIDOGREL BISULFATE 75 MILLIGRAM(S): 75 TABLET, FILM COATED ORAL at 10:10

## 2020-07-10 RX ADMIN — Medication 81 MILLIGRAM(S): at 10:10

## 2020-07-10 RX ADMIN — Medication 1 MILLIGRAM(S): at 10:11

## 2020-07-10 RX ADMIN — CHLORHEXIDINE GLUCONATE 1 APPLICATION(S): 213 SOLUTION TOPICAL at 06:07

## 2020-07-10 RX ADMIN — Medication 12.5 MILLIGRAM(S): at 06:08

## 2020-07-10 RX ADMIN — SODIUM CHLORIDE 75 MILLILITER(S): 9 INJECTION, SOLUTION INTRAVENOUS at 09:23

## 2020-07-10 RX ADMIN — Medication 500 MILLIGRAM(S): at 10:11

## 2020-07-10 RX ADMIN — LOSARTAN POTASSIUM 50 MILLIGRAM(S): 100 TABLET, FILM COATED ORAL at 06:08

## 2020-07-10 RX ADMIN — HEPARIN SODIUM 5000 UNIT(S): 5000 INJECTION INTRAVENOUS; SUBCUTANEOUS at 06:08

## 2020-07-10 NOTE — PROGRESS NOTE ADULT - ASSESSMENT
D/C Instructions:     Chandler Hernandez  CARDIOVASCULAR SURGERY  [address] [address]  Phone: [phone]  Fax: [phone]  Scheduled Appointment: 07/21/2020 12:15 PM    Arsen Rushenia  CARDIOVASCULAR DISEASE  [address] [place] [address]  Phone: [phone]  Fax: [phone]  Scheduled Appointment: 07/20/2020 03:30 PM  LANRE EDMOND ; 07/20/2020 ; NPP Cardio Vasc [address]  LANRE EDMOND ; 07/21/2020 ; NPP CT Surg [address]    - Please attend your follow up appointments.    - Do not drive or operate machinery, No heavy lifting/straining, Showering allowed, Stairs allowed, Walking - Indoors allowed, Walking - Outdoors allowed    -Walk daily as tolerated and use your incentive spirometer every hour.    -No driving or strenuous activity/exercise for 6 weeks, or until cleared by your surgeon.    -You may shower.  Gently clean your incisions with anti-bacterial soap and water, pat dry.  You may leave them open to air.    -Call your doctor if you have shortness of breath, chest pain not relieved by pain medication, dizziness, fever >101.5, or increased redness or drainage from incisions.    -Please ensure your follow up with your PCP to recheck your kidney function.

## 2020-07-10 NOTE — PROGRESS NOTE ADULT - SUBJECTIVE AND OBJECTIVE BOX
Patient discussed on morning rounds with Dr. Hernandez     Operation / Date: OPCAB x3 (LIMA-LAD, SVG-OM, RCA seq), EF normal     Surgeon: Dr. Hernandez     Referring Physician: Dr. Clay     SUBJECTIVE ASSESSMENT:  Patient feeling well this morning and wants to be discharged home. States he doesn't want anymore blood work completed. He has been told in the past that he has "elevated Cr" and say that his levels are known to go up and down "due to his psoriatic arthritis". Patient having regular BMs. Eating/drinkign well. Feels ready to be discharged. Denies CP, palpitations, SOB, wheezing abd pain, n/v/d/c, fevers or chills.     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 On 7/6 the patient underwent an uncomplicated OPCABX3 (LIMA-LAD, SVG-OM sequential RCA, EF normal), with Dr. Hernandez. He arrived to ICU stable, on no infusions, and was extubated later that day. POD1 he was transferred to the stepdown floor.  He has remained stable, tolerating BB, and ambulating independently on room air.  On PDO#4, pt noted to have increase in Cr (1.35) when baseline is 1.1. Pt states that his Cr has been known to become elevated at times while outpatient but could not give an exact number. Patient adamantly refusing lab work on the day of discharge but agreed to IV hydration. He is clinically stable to be discharged home on POD#4 after gentle hydration Dr. Gtz agreed with this plan. Per Dr. Hernandez, pt is medically ready to be discharged home with close follow up with primary care to recheck this creatinine.     CABG  Aspirin               [ X ] Yes  [  ] Contraindicated, Reason_______________________________  Beta-Blocker     [ x ] Yes  [ ]Contraindicated, Reason_______________________________  Statin                 [  X] Yes  [  ] Contraindicated, Reason_______________________________    Of note: On Day of discharge, patient medications were switched from losartan and HTCZ to Lopressor 12.5 BID per Dr. Gtz request.       Vital Signs Last 24 Hrs  T(C): 35.8 (10 Jul 2020 09:14), Max: 36.2 (09 Jul 2020 14:46)  T(F): 96.5 (10 Jul 2020 09:14), Max: 97.2 (09 Jul 2020 14:46)  HR: 86 (10 Jul 2020 09:00) (76 - 86)  BP: 126/57 (10 Jul 2020 09:00) (108/55 - 159/68)  BP(mean): 82 (10 Jul 2020 09:00) (76 - 98)  RR: 20 (10 Jul 2020 09:00) (18 - 20)  SpO2: 95% (10 Jul 2020 05:14) (94% - 96%)    EPICARDIAL WIRES REMOVED: No.  TIE DOWNS REMOVED: No.    PHYSICAL EXAM:  General: well appearing sitting in chair in NAD  Neurological: AOx3. Motor skills grossly intact  Cardiovascular: Normal S1/S2. Regular rate/rhythm. No murmurs  Respiratory: Lungs CTA bilaterally. No wheezing or rales  Gastrointestinal: +BS in all 4 quadrants. Non-distended. Soft. Non-tender  Extremities: Strength 5/5 b/l upper/lower extremities. Sensation grossly intact upper/lower extremities. No edema. No calf tenderness.  Vascular: Radial 2+bilaterally, DP 2+ b/l  Incision Sites: Sternotomy incision healing well, no erythema, purulence or ecchymosis.       LABS:                        10.0   10.93 )-----------( 288      ( 10 Jul 2020 06:07 )             30.8       COUMADIN:  No.        07-10    138  |  103  |  23  ----------------------------<  141<H>  4.4   |  27  |  1.35<H>    Ca    8.7      10 Jul 2020 06:06  Mg     2.0     07-10            Discharge CXR:  < from: Xray Chest 2 Views PA/Lat (07.09.20 @ 12:53) >]  IMPRESSION: Small bilateral pleural effusions.  < end of copied text >

## 2020-07-10 NOTE — DISCHARGE NOTE NURSING/CASE MANAGEMENT/SOCIAL WORK - PATIENT PORTAL LINK FT
You can access the FollowMyHealth Patient Portal offered by [facility] by registering at the following website: [email or web address]. By joining [facility]’s FollowMyHealth portal, you will also be able to view your health information using other applications (apps) compatible with our system.

## 2020-07-10 NOTE — CHART NOTE - NSCHARTNOTEFT_GEN_A_CORE
V Wire removal:     Pt seen and examined at the bedside. Pt not requiring pacing from V wires and in own intrinsic rhythm, hemodynamically stable. Area cleaned with chlorohexidine x3. Gentle traction was applied to wire, and wire was then cut without incident. Pt tolerated the procedure well and remained hemodynamically stable.

## 2020-07-11 ENCOUNTER — TRANSCRIPTION ENCOUNTER (OUTPATIENT)
Age: 75
End: 2020-07-11

## 2020-07-13 RX ORDER — MELOXICAM 15 MG/1
15 TABLET ORAL DAILY
Refills: 0 | Status: DISCONTINUED | COMMUNITY
End: 2020-07-13

## 2020-07-13 RX ORDER — FOLIC ACID 1 MG/1
1 TABLET ORAL DAILY
Refills: 0 | Status: ACTIVE | COMMUNITY
Start: 2020-07-13

## 2020-07-13 RX ORDER — METOPROLOL SUCCINATE 25 MG/1
25 TABLET, EXTENDED RELEASE ORAL DAILY
Qty: 1 | Refills: 0 | Status: DISCONTINUED | COMMUNITY
End: 2020-07-13

## 2020-07-13 RX ORDER — FAMOTIDINE 40 MG/1
40 TABLET, FILM COATED ORAL DAILY
Refills: 0 | Status: ACTIVE | COMMUNITY
Start: 2020-07-13

## 2020-07-14 DIAGNOSIS — I67.9 CEREBROVASCULAR DISEASE, UNSPECIFIED: ICD-10-CM

## 2020-07-14 DIAGNOSIS — I25.110 ATHEROSCLEROTIC HEART DISEASE OF NATIVE CORONARY ARTERY WITH UNSTABLE ANGINA PECTORIS: ICD-10-CM

## 2020-07-14 DIAGNOSIS — E11.9 TYPE 2 DIABETES MELLITUS WITHOUT COMPLICATIONS: ICD-10-CM

## 2020-07-14 DIAGNOSIS — Z79.4 LONG TERM (CURRENT) USE OF INSULIN: ICD-10-CM

## 2020-07-14 DIAGNOSIS — J44.9 CHRONIC OBSTRUCTIVE PULMONARY DISEASE, UNSPECIFIED: ICD-10-CM

## 2020-07-14 DIAGNOSIS — I10 ESSENTIAL (PRIMARY) HYPERTENSION: ICD-10-CM

## 2020-07-14 DIAGNOSIS — R93.1 ABNORMAL FINDINGS ON DIAGNOSTIC IMAGING OF HEART AND CORONARY CIRCULATION: ICD-10-CM

## 2020-07-14 DIAGNOSIS — E78.5 HYPERLIPIDEMIA, UNSPECIFIED: ICD-10-CM

## 2020-07-16 ENCOUNTER — LABORATORY RESULT (OUTPATIENT)
Age: 75
End: 2020-07-16

## 2020-07-17 DIAGNOSIS — F41.8 OTHER SPECIFIED ANXIETY DISORDERS: ICD-10-CM

## 2020-07-17 DIAGNOSIS — G47.33 OBSTRUCTIVE SLEEP APNEA (ADULT) (PEDIATRIC): ICD-10-CM

## 2020-07-17 DIAGNOSIS — Z87.891 PERSONAL HISTORY OF NICOTINE DEPENDENCE: ICD-10-CM

## 2020-07-17 DIAGNOSIS — Z79.84 LONG TERM (CURRENT) USE OF ORAL HYPOGLYCEMIC DRUGS: ICD-10-CM

## 2020-07-17 DIAGNOSIS — R94.4 ABNORMAL RESULTS OF KIDNEY FUNCTION STUDIES: ICD-10-CM

## 2020-07-17 DIAGNOSIS — I25.10 ATHEROSCLEROTIC HEART DISEASE OF NATIVE CORONARY ARTERY WITHOUT ANGINA PECTORIS: ICD-10-CM

## 2020-07-17 DIAGNOSIS — Z86.73 PERSONAL HISTORY OF TRANSIENT ISCHEMIC ATTACK (TIA), AND CEREBRAL INFARCTION WITHOUT RESIDUAL DEFICITS: ICD-10-CM

## 2020-07-17 DIAGNOSIS — I70.0 ATHEROSCLEROSIS OF AORTA: ICD-10-CM

## 2020-07-17 DIAGNOSIS — L40.50 ARTHROPATHIC PSORIASIS, UNSPECIFIED: ICD-10-CM

## 2020-07-17 DIAGNOSIS — D62 ACUTE POSTHEMORRHAGIC ANEMIA: ICD-10-CM

## 2020-07-17 DIAGNOSIS — E11.51 TYPE 2 DIABETES MELLITUS WITH DIABETIC PERIPHERAL ANGIOPATHY WITHOUT GANGRENE: ICD-10-CM

## 2020-07-17 DIAGNOSIS — Z98.890 OTHER SPECIFIED POSTPROCEDURAL STATES: ICD-10-CM

## 2020-07-17 DIAGNOSIS — E78.5 HYPERLIPIDEMIA, UNSPECIFIED: ICD-10-CM

## 2020-07-17 DIAGNOSIS — I10 ESSENTIAL (PRIMARY) HYPERTENSION: ICD-10-CM

## 2020-07-17 DIAGNOSIS — M48.00 SPINAL STENOSIS, SITE UNSPECIFIED: ICD-10-CM

## 2020-07-20 ENCOUNTER — APPOINTMENT (OUTPATIENT)
Dept: HEART AND VASCULAR | Facility: CLINIC | Age: 75
End: 2020-07-20
Payer: MEDICARE

## 2020-07-20 ENCOUNTER — NON-APPOINTMENT (OUTPATIENT)
Age: 75
End: 2020-07-20

## 2020-07-20 VITALS — SYSTOLIC BLOOD PRESSURE: 110 MMHG | DIASTOLIC BLOOD PRESSURE: 70 MMHG

## 2020-07-20 VITALS — BODY MASS INDEX: 28.31 KG/M2 | HEIGHT: 72 IN | WEIGHT: 209 LBS

## 2020-07-20 VITALS — TEMPERATURE: 98.4 F

## 2020-07-20 PROCEDURE — 36415 COLL VENOUS BLD VENIPUNCTURE: CPT

## 2020-07-20 PROCEDURE — 93000 ELECTROCARDIOGRAM COMPLETE: CPT

## 2020-07-20 PROCEDURE — 99215 OFFICE O/P EST HI 40 MIN: CPT

## 2020-07-21 ENCOUNTER — APPOINTMENT (OUTPATIENT)
Dept: CARDIOTHORACIC SURGERY | Facility: CLINIC | Age: 75
End: 2020-07-21
Payer: MEDICARE

## 2020-07-21 ENCOUNTER — OUTPATIENT (OUTPATIENT)
Dept: OUTPATIENT SERVICES | Facility: HOSPITAL | Age: 75
LOS: 1 days | End: 2020-07-21
Payer: MEDICARE

## 2020-07-21 VITALS
TEMPERATURE: 96.8 F | OXYGEN SATURATION: 98 % | WEIGHT: 205 LBS | SYSTOLIC BLOOD PRESSURE: 145 MMHG | RESPIRATION RATE: 18 BRPM | DIASTOLIC BLOOD PRESSURE: 63 MMHG | BODY MASS INDEX: 27.77 KG/M2 | HEIGHT: 72 IN | HEART RATE: 62 BPM

## 2020-07-21 DIAGNOSIS — Z98.89 OTHER SPECIFIED POSTPROCEDURAL STATES: Chronic | ICD-10-CM

## 2020-07-21 DIAGNOSIS — Z86.79 PERSONAL HISTORY OF OTHER DISEASES OF THE CIRCULATORY SYSTEM: Chronic | ICD-10-CM

## 2020-07-21 DIAGNOSIS — M71.21 SYNOVIAL CYST OF POPLITEAL SPACE [BAKER], RIGHT KNEE: Chronic | ICD-10-CM

## 2020-07-21 PROCEDURE — 71046 X-RAY EXAM CHEST 2 VIEWS: CPT

## 2020-07-21 PROCEDURE — 71046 X-RAY EXAM CHEST 2 VIEWS: CPT | Mod: 26

## 2020-07-21 PROCEDURE — 99024 POSTOP FOLLOW-UP VISIT: CPT

## 2020-07-29 NOTE — REVIEW OF SYSTEMS
[Feeling Fatigued] : feeling fatigued [Lower Ext Edema] : lower extremity edema [Fever] : no fever [Headache] : no headache [Chills] : no chills [Blurry Vision] : no blurred vision [Seeing Double (Diplopia)] : no diplopia [Eye Pain] : no eye pain [Earache] : no earache [Discharge From The Ears] : no discharge from the ears [Loss Of Hearing] : no hearing loss [Mouth Sores] : no mouth sores [Sore Throat] : no sore throat [Dyspnea on exertion] : not dyspnea during exertion [Sinus Pressure] : no sinus pressure [Shortness Of Breath] : no shortness of breath [Chest  Pressure] : no chest pressure [Chest Pain] : no chest pain [Leg Claudication] : no intermittent leg claudication [Palpitations] : no palpitations [Cough] : no cough [Wheezing] : no wheezing [Abdominal Pain] : no abdominal pain [Nausea] : no nausea [Vomiting] : no vomiting [Heartburn] : no heartburn [Change in Appetite] : no change in appetite [Change In The Stool] : no change in stool [Dysphagia] : no dysphagia [Joint Pain] : no joint pain [Confusion] : no confusion was observed [Depression] : no depression [Anxiety] : no anxiety

## 2020-07-29 NOTE — PHYSICAL EXAM
[General Appearance - Well Developed] : well developed [Normal Appearance] : normal appearance [Normal Conjunctiva] : the conjunctiva exhibited no abnormalities [Well Groomed] : well groomed [Normal Oral Mucosa] : normal oral mucosa [No Oral Pallor] : no oral pallor [Normal Oropharynx] : normal oropharynx [Normal Jugular Venous A Waves Present] : normal jugular venous A waves present [Exaggerated Use Of Accessory Muscles For Inspiration] : no accessory muscle use [] : no respiratory distress [Heart Sounds] : normal S1 and S2 [Heart Rate And Rhythm] : heart rate and rhythm were normal [Abdomen Tenderness] : non-tender [Bowel Sounds] : normal bowel sounds [Abdomen Soft] : soft [Oriented To Time, Place, And Person] : oriented to person, place, and time [Skin Color & Pigmentation] : normal skin color and pigmentation [Impaired Insight] : insight and judgment were intact [Affect] : the affect was normal [FreeTextEntry1] : 1+ pitting edema on LLE; 2+ pitting edema on RLE; induration and swelling of L inner thigh seen but no sign of inflammation

## 2020-07-29 NOTE — HISTORY OF PRESENT ILLNESS
[FreeTextEntry1] : 75 year-old male w/ PMH of HTN, HARISH (not using CPAP because he has lost weight and feels improved), psoriatic arthritis, s/p PICA stroke in 11/2019, right vertebral artery stenosis and s/p DCA (01/25/2020), severe aortic arch plaque, moderate LVH, followed by Dr. Soler presents presented today for follow up post CABG. He is accompanied by his wife.  \par \par The pt was initially referred here by Dr. Dr. Us for LE edema and pre-op assessment of hip replacement. The pt underwent CT coronary angiogram on 6/26/2020, which revealed coronary artery calcium score 1637; three vessel disease including LM stenosis due to noncalcific plaque. He subsequently underwent CABG x 3 on 7/6/2020. The CABG was non-complicated. Cr increased to 1.35 post-op (baseline 1.1) and Cr has returned back to normal (1.17). \par \par The pt currently reports pain in surgical site. He states that he feel tired and fatigue and he can walk from his bedroom to bathroom without getting significant SOB. He reports pain and swelling of R inner thigh, where the venous graft was harvested. He denies any fever, chills, cough, chest pain on exertion; he also denies any PND or orthopnea. He states that some of his medications are adjusted by Dr. Hernandez post-op but he is compliant to all other medications presently (ASA/Plavix, Lipitor 80 daily, Lopressor 12.5 mg bid, Metformin 500 mg daily). He states that his appetite is good. \par \par He has a follow up appointment with [facility] Dr. Hernandez tomorrow. \par

## 2020-07-29 NOTE — REASON FOR VISIT
[Follow-Up - Clinic] : a clinic follow-up of [CABG Follow-up] : bypass graft [Spouse] : spouse [FreeTextEntry1] : psoriatic arthritis, s/p PICA stroke in 11/2019, right vertebral artery stenosis and s/p DCA (01/25/2020), severe aortic arch plaque, moderate LVH, followed by Dr. Soler presents presented today for follow up post CABG.

## 2020-07-29 NOTE — DISCUSSION/SUMMARY
[Patient] : the patient [FreeTextEntry1] : 75 year-old male w/ PMH of HTN, HARISH (not using CPAP because he has lost weight and feels improved), psoriatic arthritis, s/p PICA stroke in 11/2019, right vertebral artery stenosis and s/p DCA (01/25/2020), severe aortic arch plaque, moderate LVH, followed by Dr. Soler presents presented today for follow up post CABG. He was initially referred from Dr. Us for LE edema and pre-operative assessment prior to right hip replacement and noted to have 3VD including LM and he is now s/p CABG x 3 (7/6/2020) \par \par # CAD s/p CABG - CABG is uncomplicated and he pt reports some pain from surgical site, which is controlled with Percocet 5/325 1.5 tab q 6 hrs; c/o swelling of RLE where venous graft was denies any chest pain on exertion, orthopnea or PND. Clinically he is euvolemic excpet 1+ pittiing edema on LLE and 2+ pitting on RLE. Will continue ASA/ Plavix, Lipitor 80 mg daily and Lopressor 12.5 mg bid. Will check BMP and BNP today. He will follows with [facility] Dr. Hernandez tomorrow \par \par # HTN - blood pressure is at goal (110/70 mmHg today) with Lopressor 12.5 mg bid. Due to poor peripheral pulse (most likely due to aortic plaque), it is difficult to measure non-invasive BP via either manual or automatic BP cuff. Will need to figure out a way to accurately monitor his BP \par \par # DM - last A1c 6.5; will continue Metformin 500 mg daily. He is counselled lengthly on diet and life style modification \par \par # HLD - last LDL 40 mg/dL; will continue Lipitor 80 mg hs. Will repeat his fasting lipid panel in the future \par \par follow up in 1 month \par \par I was physically present for the key portions of the evaluation and management service provided. I agree with the above history, physical, and plan which I have reviewed and edited where appropriate. I have examined the patient and plan was discussed with the patient as well. \par \par \par

## 2020-08-13 ENCOUNTER — TRANSCRIPTION ENCOUNTER (OUTPATIENT)
Age: 75
End: 2020-08-13

## 2020-08-17 ENCOUNTER — APPOINTMENT (OUTPATIENT)
Dept: HEART AND VASCULAR | Facility: CLINIC | Age: 75
End: 2020-08-17
Payer: MEDICARE

## 2020-08-17 VITALS
BODY MASS INDEX: 26.82 KG/M2 | WEIGHT: 198 LBS | TEMPERATURE: 98.4 F | SYSTOLIC BLOOD PRESSURE: 124 MMHG | HEIGHT: 72 IN | DIASTOLIC BLOOD PRESSURE: 78 MMHG

## 2020-08-17 DIAGNOSIS — R06.00 DYSPNEA, UNSPECIFIED: ICD-10-CM

## 2020-08-17 PROCEDURE — 99215 OFFICE O/P EST HI 40 MIN: CPT

## 2020-08-21 PROBLEM — R06.00 DYSPNEA ON EXERTION: Status: ACTIVE | Noted: 2020-06-16

## 2020-08-21 NOTE — DISCUSSION/SUMMARY
[FreeTextEntry1] : 75 year-old male w/ PMH of HTN, HARISH (not using CPAP because he has lost weight and feels improved), psoriatic arthritis, s/p PICA stroke in 11/2019, right vertebral artery stenosis and s/p DCA (01/25/2020), severe aortic arch plaque, moderate LVH, who presents today for follow up post CABG. \par \par Mr. Gasca is doing very well post-CABG overall. He is quite limited by his hip pain, but he also wants to lose more weight so that he is optimized prior to that surgery and seems motivated to do so. \par \par Since he has no anginal symptoms and fair exercise capacity post-revascularization and is not on a nitrate, I have told him that he has no contraindications to taking a PDE inhibitor. \par \par His BP's are well controlled at this time. Will continue to monitor and have him return for a follow-up in the next few months.\par

## 2020-08-21 NOTE — PHYSICAL EXAM
[General Appearance - Well Developed] : well developed [Well Groomed] : well groomed [Normal Appearance] : normal appearance [General Appearance - Well Nourished] : well nourished [No Deformities] : no deformities [General Appearance - In No Acute Distress] : no acute distress [Normal Jugular Venous A Waves Present] : normal jugular venous A waves present [Normal Jugular Venous V Waves Present] : normal jugular venous V waves present [No Jugular Venous Broderick A Waves] : no jugular venous broderick A waves [Respiration, Rhythm And Depth] : normal respiratory rhythm and effort [Exaggerated Use Of Accessory Muscles For Inspiration] : no accessory muscle use [Auscultation Breath Sounds / Voice Sounds] : lungs were clear to auscultation bilaterally [Heart Rate And Rhythm] : heart rate and rhythm were normal [Heart Sounds] : normal S1 and S2 [Murmurs] : no murmurs present [Abdomen Soft] : soft [Abdomen Tenderness] : non-tender [Abdomen Mass (___ Cm)] : no abdominal mass palpated [Nail Clubbing] : no clubbing of the fingernails [Cyanosis, Localized] : no localized cyanosis [Petechial Hemorrhages (___cm)] : no petechial hemorrhages [Skin Color & Pigmentation] : normal skin color and pigmentation [] : no rash [No Venous Stasis] : no venous stasis [Skin Lesions] : no skin lesions [No Skin Ulcers] : no skin ulcer [No Xanthoma] : no  xanthoma was observed [Oriented To Time, Place, And Person] : oriented to person, place, and time [No Anxiety] : not feeling anxious [Mood] : the mood was normal [Affect] : the affect was normal

## 2020-08-21 NOTE — HISTORY OF PRESENT ILLNESS
[FreeTextEntry1] : 75 year-old male w/ PMH of HTN, HARISH (not using CPAP because he has lost weight and feels improved), psoriatic arthritis, s/p PICA stroke in 11/2019, right vertebral artery stenosis and s/p DCA (01/25/2020), severe aortic arch plaque, moderate LVH, who presents today for follow up post CABG. \par \par He reports being down because his wife left him recently, but also mentions that he feels that it is for the best and there is less stress now that he is not arguing a lot and prefers to be on his own. \par \par He reports that this is the first time since well before his CABG that he had an erection and inquires about the use of cialis or viagra. He only endorses wound discomfort which is mild and does not note any chest heaviness or dyspnea. He is limited in her activities because of hip pain, but not any dyspnea or chest pain. \par \par He does not check his blood pressure at home or his weight. \par \par He has not been going to [employer] and is actually eating more Telugu healthy meals. \par \par Prior history-\par The pt was initially referred here by Dr. Dr. Us for LE edema. The pt underwent CT coronary angiogram on 6/26/2020, which showed coronary artery calcium score 1637; LM - borderline significant proximal stenosis due to noncalcific plaque and three vessel disease. The pt subsequently underwent CABG x 3 on 7/6/2020. The CABG was non-complicated. Cr increased to 1.35 post-op (baseline 1.1) and Cr has returned back to normal (1.17). \par \par \par \par

## 2020-09-03 ENCOUNTER — APPOINTMENT (OUTPATIENT)
Dept: NEUROSURGERY | Facility: CLINIC | Age: 75
End: 2020-09-03

## 2020-09-03 ENCOUNTER — APPOINTMENT (OUTPATIENT)
Dept: NEUROSURGERY | Facility: CLINIC | Age: 75
End: 2020-09-03
Payer: MEDICARE

## 2020-09-03 PROCEDURE — 99213 OFFICE O/P EST LOW 20 MIN: CPT | Mod: 95

## 2020-09-08 NOTE — REASON FOR VISIT
[FreeTextEntry1] : TODAY'S VISIT:\par Patient returns for a follow up appointment, he underwent a Diagnostic Angiogram on 1/25/2020 that showed Bilateral vertebrals are suppling no flow at all but flow is coming from a collateral vessel. He was placed on Plavix/ Atorvastatin as treatment with instructions that surgical consideration would only occur if he was having stroke like symptoms.\par \par PRIOR VISIT 2/13/2020:\par Today  is here for f/u s/p DCA to discuss results. Pt reports that he has not experienced any neurological deficits he denied dizziness, weakness, numbness\par or visual changes. Pt reports that he has continued the Plavix and ASA regimen as well as a statin. \par \par

## 2020-09-08 NOTE — ADDENDUM
[FreeTextEntry1] : September 3, 2020\par \par Bruce Clay M.D.\par [address]\[place] [address]\par \par Re:	ValeriyJoseph washburny \par \par Dear Dr. Clay:\par \par I spoke with Demetrio by telephone today.  He was here for a followup visit and was unable to access our telehealth platform.  He is stable on aspirin and Plavix with known vertebral disease and has had open heart surgery and appears to be doing well neurologically.  I once again encouraged him to remain on his antiplatelet drugs as well as his Lipitor and to stay healthy.  So far, he appears to be doing well, and I will continue to follow him with a followup MRA NOVA test, which is an MRA blood flow study, in early 2021.  \par \par Thank you so much for allowing me to contribute to his care.\par \par Sincerely,\par \par \par \par Randy Soler MD\par \par [facility] #[id number]\par \par \par

## 2020-09-08 NOTE — ASSESSMENT
[FreeTextEntry1] : Plan:\par - All imaging has been reviewed by Dr. Soler, in depth, and with patient. All questions answered.\par - Instructed to continue with ASA/Plavix.\par - Obtain MRA NOVA in January 2021.\par \par \par Patient verbalizes understanding and agreement with current plan.\par

## 2020-09-08 NOTE — HISTORY OF PRESENT ILLNESS
[FreeTextEntry1] : 75yo right handed male \par History of Present Illness\par System atrial child with a history of a cerebrovascular accident\par \par He is left with weakness involving left side of his body unsteadiness no specific difficulties involving language remote and some dysarthric speech. He has a history of spinal stenosis is not currently receiving physical therapy is on Plavix and aspirin I have seen multiple radiological studies including those from his hospitalization. \par \par

## 2020-09-25 LAB
ANION GAP SERPL CALC-SCNC: 15 MMOL/L
BUN SERPL-MCNC: 24 MG/DL
CALCIUM SERPL-MCNC: 9.4 MG/DL
CHLORIDE SERPL-SCNC: 99 MMOL/L
CO2 SERPL-SCNC: 24 MMOL/L
CREAT SERPL-MCNC: 1.2 MG/DL
GLUCOSE SERPL-MCNC: 129 MG/DL
NT-PROBNP SERPL-MCNC: 561 PG/ML
POTASSIUM SERPL-SCNC: 4.9 MMOL/L
SODIUM SERPL-SCNC: 138 MMOL/L

## 2020-10-01 ENCOUNTER — APPOINTMENT (OUTPATIENT)
Dept: NEUROSURGERY | Facility: CLINIC | Age: 75
End: 2020-10-01

## 2020-10-16 ENCOUNTER — NON-APPOINTMENT (OUTPATIENT)
Age: 75
End: 2020-10-16

## 2020-10-16 ENCOUNTER — APPOINTMENT (OUTPATIENT)
Dept: HEART AND VASCULAR | Facility: CLINIC | Age: 75
End: 2020-10-16
Payer: MEDICARE

## 2020-10-16 VITALS
DIASTOLIC BLOOD PRESSURE: 82 MMHG | BODY MASS INDEX: 29.12 KG/M2 | HEART RATE: 92 BPM | SYSTOLIC BLOOD PRESSURE: 121 MMHG | HEIGHT: 71 IN | WEIGHT: 208 LBS

## 2020-10-16 VITALS — TEMPERATURE: 98 F

## 2020-10-16 VITALS — HEIGHT: 71 IN | WEIGHT: 208 LBS | BODY MASS INDEX: 29.12 KG/M2

## 2020-10-16 DIAGNOSIS — Z86.39 PERSONAL HISTORY OF OTHER ENDOCRINE, NUTRITIONAL AND METABOLIC DISEASE: ICD-10-CM

## 2020-10-16 PROCEDURE — 93923 UPR/LXTR ART STDY 3+ LVLS: CPT

## 2020-10-16 PROCEDURE — 99214 OFFICE O/P EST MOD 30 MIN: CPT

## 2020-10-16 PROCEDURE — 93000 ELECTROCARDIOGRAM COMPLETE: CPT

## 2020-10-16 NOTE — PHYSICAL EXAM
[General Appearance - Well Developed] : well developed [General Appearance - In No Acute Distress] : no acute distress [Normal Jugular Venous A Waves Present] : normal jugular venous A waves present [Normal Jugular Venous V Waves Present] : normal jugular venous V waves present [No Jugular Venous Broderick A Waves] : no jugular venous broderick A waves [Respiration, Rhythm And Depth] : normal respiratory rhythm and effort [Exaggerated Use Of Accessory Muscles For Inspiration] : no accessory muscle use [Auscultation Breath Sounds / Voice Sounds] : lungs were clear to auscultation bilaterally [Heart Rate And Rhythm] : heart rate and rhythm were normal [Heart Sounds] : normal S1 and S2 [Abdomen Soft] : soft [Murmurs] : no murmurs present [Abdomen Tenderness] : non-tender [Abdomen Mass (___ Cm)] : no abdominal mass palpated [Cyanosis, Localized] : no localized cyanosis [Petechial Hemorrhages (___cm)] : no petechial hemorrhages [Nail Clubbing] : no clubbing of the fingernails [] : no ischemic changes [No Skin Ulcers] : no skin ulcer [No Xanthoma] : no  xanthoma was observed [FreeTextEntry1] : pale

## 2020-10-16 NOTE — HISTORY OF PRESENT ILLNESS
[FreeTextEntry1] : 75 year-old male w/ PMH of HTN, HARISH (not using CPAP because he has lost weight and feels improved), psoriatic arthritis, s/p PICA stroke in 11/2019, right vertebral artery stenosis and s/p DCA (01/25/2020), severe aortic arch plaque, CAD s/p CABG 7/2020, moderate LVH, right hip pain, lumbar disc disease,  who presents today for assessment prior to bladder surgery for newly noted bladder cancer. \par \par He reports having lost a significant amount of weight and is now trying to eat small frequent meals and trying to maintain his weight. He is greatly limited by pain in his right hip and lower back. \par He had had hematuria recently and also noted blood in his stools. \par He was due for a weekly transfusion in the past two weeks and did not make it there due to limitations in terms of pain. \par He has had recent flairs of his psoriatic arthritis and his treatments are helping to a certain extent. \par \par Prior history-\par The pt was initially referred here by Dr. Dr. Us for LE edema. The pt underwent CT coronary angiogram on 6/26/2020, which showed coronary artery calcium score 1637; LM - borderline significant proximal stenosis due to noncalcific plaque and three vessel disease. The pt subsequently underwent CABG x 3 on 7/6/2020. The CABG was non-complicated. Cr increased to 1.35 post-op (baseline 1.1) and Cr has returned back to normal (1.17). \par \par CABG- 7/6/2020- LIMA-LAD, RSVG to OM1 and to PL RCA\par \par 6/16/20- Echo\par 1. Left ventricular ejection fraction, by visual estimation, is 66%.\par 2. Mildly increased LV wall thickness.\par 3. Normal right ventricular size and function.\par 4. Mild aortic regurgitation.\par 5. There is no evidence of pericardial effusion.\par 10/16/2020- IBRAHIMA-  Mild-moderately reduced resting IBRAHIMA's on right with elevated IBRAHIMA's on left. Likely noncompressible vessels on left; Moderate PAD at rest on right- involving fem-pop and tibial\par

## 2020-10-16 NOTE — REVIEW OF SYSTEMS
[Feeling Fatigued] : feeling fatigued [Hematuria] : hematuria [Change in Appetite] : change in appetite [Joint Swelling] : joint swelling [Joint Pain] : joint pain [Nocturia] : nocturia [Depression] : depression [Joint Stiffness] : joint stiffness [Under Stress] : under stress [Fever] : no fever [Dyspnea on exertion] : not dyspnea during exertion [Shortness Of Breath] : no shortness of breath [Lower Ext Edema] : no extremity edema [Chest Pain] : no chest pain

## 2020-10-16 NOTE — DISCUSSION/SUMMARY
[FreeTextEntry1] : 75 year-old male w/ PMH of HTN, HARISH (not using CPAP because he has lost weight and feels improved), psoriatic arthritis, s/p PICA stroke in 11/2019, right vertebral artery stenosis and s/p DCA (01/25/2020), severe aortic arch plaque, CAD s/p CABG 7/2020, moderate LVH, right hip pain, lumbar disc disease,  who presents today for assessment prior to bladder surgery for newly noted bladder cancer. \par \par Mr. Gasca is stable to proceed with upcoming surgery from a cardiac standpoint. He has no evidence of active ischemia or heart failure and has fair exercise capacity limited only by back and hip pain. \par His recent weight loss has actually improved his cardiac right factors and he is off of several prior medications. He is going to Dr. Bello for lab work and assessment now and will likely be significantly anemic. He will follow-up with Josh for further planning. I have instructed him to stop Plavix, but continue aspirin as of today as his surgery is next Thursday.

## 2021-01-13 NOTE — H&P ADULT - NSICDXPASTMEDICALHX_GEN_ALL_CORE_FT
PAST MEDICAL HISTORY:  Cholelithiases     Hypertension     Leukocytosis     Psoriasis     Psoriatic arthritis [Rest] : relieved by rest [FreeTextEntry1] : 16 month old male brought in by his mother presents for follow up of L clavicle fracture. Mother states on 12/4, 4 weeks ago, patient was playing on the bed when he fell onto his left shoulder. Mother states patient had severe pain in the shoulder with the inability to provide ROM of the shoulder. He was brought to [facility] urgent care where XRs were done and a clavicle fracture was noted. Patient was put into a sling and told to follow up in ped ortho clinic. He was seen here on 12/9 where the sling was continued.  He has been compliant with activity restrictions.  He stopped wearing the sling about 4 days ago.  Today, mother states he has been doing much better.  He is not complaining of pain and is using his arm fully.  Here for xrays and management.

## 2021-02-22 ENCOUNTER — NON-APPOINTMENT (OUTPATIENT)
Age: 76
End: 2021-02-22

## 2021-02-22 ENCOUNTER — APPOINTMENT (OUTPATIENT)
Dept: HEART AND VASCULAR | Facility: CLINIC | Age: 76
End: 2021-02-22
Payer: MEDICARE

## 2021-02-22 VITALS
TEMPERATURE: 97.2 F | BODY MASS INDEX: 28.42 KG/M2 | SYSTOLIC BLOOD PRESSURE: 126 MMHG | WEIGHT: 203 LBS | DIASTOLIC BLOOD PRESSURE: 80 MMHG | HEART RATE: 92 BPM | HEIGHT: 71 IN

## 2021-02-22 DIAGNOSIS — Z95.1 PRESENCE OF AORTOCORONARY BYPASS GRAFT: ICD-10-CM

## 2021-02-22 DIAGNOSIS — I63.9 CEREBRAL INFARCTION, UNSPECIFIED: ICD-10-CM

## 2021-02-22 PROCEDURE — 99215 OFFICE O/P EST HI 40 MIN: CPT

## 2021-02-22 PROCEDURE — 93000 ELECTROCARDIOGRAM COMPLETE: CPT

## 2021-02-22 PROCEDURE — 99072 ADDL SUPL MATRL&STAF TM PHE: CPT

## 2021-02-22 RX ORDER — VENLAFAXINE HYDROCHLORIDE 37.5 MG/1
37.5 CAPSULE, EXTENDED RELEASE ORAL
Qty: 90 | Refills: 0 | Status: DISCONTINUED | COMMUNITY
Start: 2020-01-15 | End: 2021-02-22

## 2021-02-22 RX ORDER — METHYLPREDNISOLONE 4 MG/1
4 TABLET ORAL
Qty: 21 | Refills: 0 | Status: COMPLETED | COMMUNITY
Start: 2020-12-07

## 2021-02-22 RX ORDER — OXYCODONE AND ACETAMINOPHEN 5; 325 MG/1; MG/1
5-325 TABLET ORAL EVERY 8 HOURS
Qty: 21 | Refills: 0 | Status: DISCONTINUED | COMMUNITY
Start: 2020-07-13 | End: 2021-02-22

## 2021-02-22 RX ORDER — CEPHALEXIN 500 MG/1
500 CAPSULE ORAL
Qty: 10 | Refills: 0 | Status: DISCONTINUED | COMMUNITY
Start: 2020-11-16

## 2021-02-22 RX ORDER — PHENAZOPYRIDINE HYDROCHLORIDE 100 MG/1
100 TABLET ORAL
Qty: 30 | Refills: 0 | Status: DISCONTINUED | COMMUNITY
Start: 2020-11-19

## 2021-02-22 RX ORDER — HYDROCHLOROTHIAZIDE 25 MG/1
25 TABLET ORAL
Qty: 90 | Refills: 0 | Status: DISCONTINUED | COMMUNITY
Start: 2020-06-12 | End: 2021-02-22

## 2021-02-22 RX ORDER — DULOXETINE HYDROCHLORIDE 20 MG/1
20 CAPSULE, DELAYED RELEASE PELLETS ORAL
Qty: 90 | Refills: 0 | Status: DISCONTINUED | COMMUNITY
Start: 2020-10-20

## 2021-02-22 RX ORDER — AMLODIPINE BESYLATE 10 MG/1
10 TABLET ORAL
Qty: 90 | Refills: 0 | Status: DISCONTINUED | COMMUNITY
Start: 2019-12-13 | End: 2021-02-22

## 2021-02-22 RX ORDER — LORAZEPAM 1 MG/1
1 TABLET ORAL
Refills: 0 | Status: DISCONTINUED | COMMUNITY
Start: 2017-07-19 | End: 2021-02-22

## 2021-02-22 RX ORDER — AMOXICILLIN AND CLAVULANATE POTASSIUM 500; 125 MG/1; MG/1
500-125 TABLET, FILM COATED ORAL
Qty: 10 | Refills: 0 | Status: DISCONTINUED | COMMUNITY
Start: 2020-10-19

## 2021-02-22 RX ORDER — SULFAMETHOXAZOLE AND TRIMETHOPRIM 800; 160 MG/1; MG/1
800-160 TABLET ORAL
Qty: 10 | Refills: 0 | Status: DISCONTINUED | COMMUNITY
Start: 2020-11-19

## 2021-02-22 NOTE — PHYSICAL EXAM
[General Appearance - Well Nourished] : well nourished [Normal Conjunctiva] : the conjunctiva exhibited no abnormalities [Normal Oral Mucosa] : normal oral mucosa [Normal Jugular Venous V Waves Present] : normal jugular venous V waves present [Respiration, Rhythm And Depth] : normal respiratory rhythm and effort [Auscultation Breath Sounds / Voice Sounds] : lungs were clear to auscultation bilaterally [Lungs Percussion] : the lungs were normal to percussion [Chest Palpation] : palpation of the chest revealed no abnormalities [Heart Sounds] : normal S1 and S2 [Arterial Pulses Normal] : the arterial pulses were normal [Edema] : no peripheral edema present [Veins - Varicosity Changes] : no varicosital changes were noted in the lower extremities [Bowel Sounds] : normal bowel sounds [Abdomen Soft] : soft [Abdomen Tenderness] : non-tender [Abdomen Mass (___ Cm)] : no abdominal mass palpated [Abnormal Walk] : normal gait [Cyanosis, Localized] : no localized cyanosis [Skin Turgor] : normal skin turgor [] : no rash [Oriented To Time, Place, And Person] : oriented to person, place, and time [Impaired Insight] : insight and judgment were intact [Affect] : the affect was normal [No Anxiety] : not feeling anxious

## 2021-02-22 NOTE — HISTORY OF PRESENT ILLNESS
[FreeTextEntry1] : 75 year-old male w/ PMH of HTN, HARISH (not using CPAP), psoriatic arthritis, s/p PICA stroke in 11/2019, right vertebral artery stenosis and s/p DCA (01/25/2020), severe aortic arch plaque, CAD s/p 3v CABG 07/2020, moderate LVH, OA R hip, lumbar disc disease, who presents today for pre-op evaluation prior to hip surgery. \par \par He states his R hip pain worsened recently his physical activity was largely limited by the R hip pain and he can ambulate with a RW from bed to the bedroom; he could not ambulate more than that. He denies any exertional symptoms with his minimal exertion; deneis any chest pain exertion or at rest; denies any leg swelling, orthopnea or PND. He reports more exercise tolerance before. He reports compliance to his medication. \par \par He has recently underwent a bladder surgery and has had a bladder mass excised; was told the mass was benign. \par \par Prior history-\par The pt was initially referred here by Dr. Dr. Us for LE edema. The pt underwent CT coronary angiogram on 6/26/2020, which showed coronary artery calcium score 1637; LM - borderline significant proximal stenosis due to noncalcific plaque and three vessel disease. The pt subsequently underwent CABG x 3 on 7/6/2020. The CABG was non-complicated. Cr increased to 1.35 post-op (baseline 1.1) and Cr has returned back to normal (1.17). \par \par CABG- 7/6/2020- LIMA-LAD, RSVG to OM1 and to PL RCA\par \par 6/16/20- Echo\par 1. Left ventricular ejection fraction, by visual estimation, is 66%.\par 2. Mildly increased LV wall thickness.\par 3. Normal right ventricular size and function.\par 4. Mild aortic regurgitation.\par 5. There is no evidence of pericardial effusion.\par 10/16/2020- IBRAHIMA- Mild-moderately reduced resting IBRAHIMA's on right with elevated IBRAHIMA's on left. Likely noncompressible vessels on left; Moderate PAD at rest on right- involving fem-pop and tibial\par

## 2021-02-22 NOTE — DISCUSSION/SUMMARY
[FreeTextEntry1] : 75 year-old male w/ PMH of HTN, DM, HARISH (not using CPAP), psoriatic arthritis, s/p PICA stroke in 11/2019, right vertebral artery stenosis and s/p DCA (01/25/2020), severe aortic arch plaque, CAD s/p 3v CABG 07/2020, moderate LVH, OA R hip, lumbar disc disease, who presents today for pre-op evaluation prior to hip surgery. \par \par 1. Pre-op evaluation for hip surgery \par - he is medically optimized from CV standpoint to proceed to hip surgery \par - no further testing is required before the surgery \par - need to hold Plavix 75 mg 5 days prior to the surgery \par - ASA 81 mg can be continued throughout the surgery \par - restart Plavix 24 hrs after the surgery and when there is no surgical contraindication to resume Plavix like bleeding \par \par 2. CAD s/p 3v CABG \par - he is stable in terms of CAD \par - continue ASA and Plavix for now \par - continue Lipitor 80 mg daily \par - continue Losartan 100 mg daily \par \par 3. HTN and HLD \par - BP is well controlled \par - last A1c 40 (01/2020) \par - will repeat lipid panel in future visit \par \par 4. DM \par - continue Metformin \par - continue med regimen as above \par

## 2021-02-22 NOTE — REVIEW OF SYSTEMS
[Joint Pain] : joint pain [Fever] : no fever [Chills] : no chills [Blurry Vision] : no blurred vision [Eyeglasses] : not currently wearing eyeglasses [Earache] : no earache [Discharge From The Ears] : no discharge from the ears [Mouth Sores] : no mouth sores [Sore Throat] : no sore throat [Shortness Of Breath] : no shortness of breath [Dyspnea on exertion] : not dyspnea during exertion [Chest Pain] : no chest pain [Lower Ext Edema] : no extremity edema [Palpitations] : no palpitations [Cough] : no cough [Abdominal Pain] : no abdominal pain [Nausea] : no nausea [Heartburn] : no heartburn [Change in Appetite] : no change in appetite [Dysphagia] : no dysphagia [Urinary Frequency] : no change in urinary frequency [Impotence] : no impotence [Joint Swelling] : no joint swelling [Joint Stiffness] : no joint stiffness [Muscle Cramps] : no muscle cramps [Limb Weakness (Paresis)] : no limb weakness [Skin: A Rash] : no rash: [Skin Lesions] : no skin lesions [Dizziness] : no dizziness [Convulsions] : no convulsions [Confusion] : no confusion was observed [Anxiety] : no anxiety [Excessive Thirst] : no polydipsia [Easy Bleeding] : no tendency for easy bleeding

## 2021-02-26 ENCOUNTER — APPOINTMENT (OUTPATIENT)
Dept: HEART AND VASCULAR | Facility: CLINIC | Age: 76
End: 2021-02-26

## 2021-03-13 ENCOUNTER — TRANSCRIPTION ENCOUNTER (OUTPATIENT)
Age: 76
End: 2021-03-13

## 2021-03-15 ENCOUNTER — TRANSCRIPTION ENCOUNTER (OUTPATIENT)
Age: 76
End: 2021-03-15

## 2021-03-15 VITALS
DIASTOLIC BLOOD PRESSURE: 80 MMHG | WEIGHT: 211.2 LBS | TEMPERATURE: 97 F | RESPIRATION RATE: 16 BRPM | HEIGHT: 71 IN | SYSTOLIC BLOOD PRESSURE: 158 MMHG | OXYGEN SATURATION: 100 % | HEART RATE: 90 BPM

## 2021-03-15 RX ORDER — METHOTREXATE 2.5 MG/1
0 TABLET ORAL
Qty: 0 | Refills: 0 | DISCHARGE

## 2021-03-15 RX ORDER — POVIDONE-IODINE 5 %
1 AEROSOL (ML) TOPICAL ONCE
Refills: 0 | Status: COMPLETED | OUTPATIENT
Start: 2021-03-16 | End: 2021-03-16

## 2021-03-15 NOTE — H&P ADULT - PROBLEM SELECTOR PLAN 2
Continue outpatient treatment regimen s/p CABG x3 in 7/2020  Continue Lipitor 80 daily and Losartan 100mg daily

## 2021-03-15 NOTE — H&P ADULT - HISTORY OF PRESENT ILLNESS
76M with right hip pain x    Pt has PMHx HTN, HLD, DM, bypass x3, stroke in 2019, bladder CA (2020).   Pt has been holding Plavix since ______  Presents today for right JOSE.  76M with right hip pain x 10 years. Denies known injury. Reports hip pain has progressed since onset. Patient notes stiffness, pain in his R groin, limited ROM. Notes h/o lumbar laminectomy/discectomy 6 yrs ago. Patient denies weakness, legs giving out. Uses a walker for ambulator assistance. Patient notes failure of conservative management for right hip pain including oral analgesics, activity modification. Patient denies recent illness, fevers. Takes plavix for h/o stroke/bypass. Takes prednisone/methotrexate for psoriatic arthritis   Pt has PMHx HTN, HLD, DM, bypass x3, stroke in 2019, bladder CA (2020).   Presents today for right JOSE with Dr. Austin.

## 2021-03-15 NOTE — H&P ADULT - NSHPLABSRESULTS_GEN_ALL_CORE
Preop Cr:  COVID: Preop Cr: 1.25, noted, no contraindication to surgery per medical clearance  WBC 15.2- per medical clearance, chronic and stable; pt has seen hematology in past and continues to be monitored  COVID: negative  CABG x 3 on 7/2020  Echo 6/20: LVEF 66%   CABG x 3 on 7/2020   10/2020 IBRAHIMA: Mild moderately reduced resting IBRAHIMA's on right with elevated IBRAHIMA's on left. Likely noncompressible vessels on left; moderate PAD at rest on right- involving fem-pop and tibial- reviewed by cardiac clearance  Rest of labs reviewed by medical clearance

## 2021-03-15 NOTE — H&P ADULT - PROBLEM SELECTOR PLAN 1
Admit to Orthopaedic Service.  Presents today for elective right JOSE with Dr. Austin  Pt medically stable and cleared for procedure today by Dr.  *May need stress dose steroids by anesthesia prior to OR Admit to Orthopaedic Service.  Presents today for elective right JOSE with Dr. Austin  Pt medically stable and cleared for procedure today by Dr. Rush (Cardio) and SYLWIA Araujo (med)   *May need stress dose steroids by anesthesia prior to OR  *Held plavix x ___ days prior to OR Admit to Orthopaedic Service.  Presents today for elective right JOSE with Dr. Austin  Pt medically stable and cleared for procedure today by Dr. Rush (Cardio) and SYLWIA Araujo (med)

## 2021-03-15 NOTE — H&P ADULT - PROBLEM SELECTOR PLAN 5
Plavix recommended to be held x 2 weeks  Will start on ASA 325BID x 2 weeks post operatively per Dr. Austin Insulin sliding scale to be ordered while inpatient.   Consistent Carb diet  Will monitor FS

## 2021-03-15 NOTE — PRE-OP CHECKLIST - HAIR REMOVAL
Rituxan Counseling:  I discussed with the patient the risks of Rituxan infusions. Side effects can include infusion reactions, severe drug rashes including mucocutaneous reactions, reactivation of latent hepatitis and other infections and rarely progressive multifocal leukoencephalopathy.  All of the patient's questions and concerns were addressed. Albendazole Pregnancy And Lactation Text: This medication is Pregnancy Category C and it isn't known if it is safe during pregnancy. It is also excreted in breast milk. Metronidazole Counseling:  I discussed with the patient the risks of metronidazole including but not limited to seizures, nausea/vomiting, a metallic taste in the mouth, nausea/vomiting and severe allergy. Dupixent Counseling: I discussed with the patient the risks of dupilumab including but not limited to eye infection and irritation, cold sores, injection site reactions, worsening of asthma, allergic reactions and increased risk of parasitic infection.  Live vaccines should be avoided while taking dupilumab. Dupilumab will also interact with certain medications such as warfarin and cyclosporine. The patient understands that monitoring is required and they must alert us or the primary physician if symptoms of infection or other concerning signs are noted. Enbrel Counseling:  I discussed with the patient the risks of etanercept including but not limited to myelosuppression, immunosuppression, autoimmune hepatitis, demyelinating diseases, lymphoma, and infections.  The patient understands that monitoring is required including a PPD at baseline and must alert us or the primary physician if symptoms of infection or other concerning signs are noted. Protopic Pregnancy And Lactation Text: This medication is Pregnancy Category C. It is unknown if this medication is excreted in breast milk when applied topically. Cyclophosphamide Pregnancy And Lactation Text: This medication is Pregnancy Category D and it isn't considered safe during pregnancy. This medication is excreted in breast milk. Taltz Counseling: I discussed with the patient the risks of ixekizumab including but not limited to immunosuppression, serious infections, worsening of inflammatory bowel disease and drug reactions.  The patient understands that monitoring is required including a PPD at baseline and must alert us or the primary physician if symptoms of infection or other concerning signs are noted. Birth Control Pills Pregnancy And Lactation Text: This medication should be avoided if pregnant and for the first 30 days post-partum. Siliq Pregnancy And Lactation Text: The risk during pregnancy and breastfeeding is uncertain with this medication. Stelara Counseling:  I discussed with the patient the risks of ustekinumab including but not limited to immunosuppression, malignancy, posterior leukoencephalopathy syndrome, and serious infections.  The patient understands that monitoring is required including a PPD at baseline and must alert us or the primary physician if symptoms of infection or other concerning signs are noted. Xolair Counseling:  Patient informed of potential adverse effects including but not limited to fever, muscle aches, rash and allergic reactions.  The patient verbalized understanding of the proper use and possible adverse effects of Xolair.  All of the patient's questions and concerns were addressed. Bactrim Pregnancy And Lactation Text: This medication is Pregnancy Category D and is known to cause fetal risk.  It is also excreted in breast milk. hair removal not indicated Spironolactone Counseling: Patient advised regarding risks of diarrhea, abdominal pain, hyperkalemia, birth defects (for female patients), liver toxicity and renal toxicity. The patient may need blood work to monitor liver and kidney function and potassium levels while on therapy. The patient verbalized understanding of the proper use and possible adverse effects of spironolactone.  All of the patient's questions and concerns were addressed. Terbinafine Counseling: Patient counseling regarding adverse effects of terbinafine including but not limited to headache, diarrhea, rash, upset stomach, liver function test abnormalities, itching, taste/smell disturbance, nausea, abdominal pain, and flatulence.  There is a rare possibility of liver failure that can occur when taking terbinafine.  The patient understands that a baseline LFT and kidney function test may be required. The patient verbalized understanding of the proper use and possible adverse effects of terbinafine.  All of the patient's questions and concerns were addressed. Minocycline Counseling: Patient advised regarding possible photosensitivity and discoloration of the teeth, skin, lips, tongue and gums.  Patient instructed to avoid sunlight, if possible.  When exposed to sunlight, patients should wear protective clothing, sunglasses, and sunscreen.  The patient was instructed to call the office immediately if the following severe adverse effects occur:  hearing changes, easy bruising/bleeding, severe headache, or vision changes.  The patient verbalized understanding of the proper use and possible adverse effects of minocycline.  All of the patient's questions and concerns were addressed. Propranolol Counseling:  I discussed with the patient the risks of propranolol including but not limited to low heart rate, low blood pressure, low blood sugar, restlessness and increased cold sensitivity. They should call the office if they experience any of these side effects. 5-Fu Counseling: 5-Fluorouracil Counseling:  I discussed with the patient the risks of 5-fluorouracil including but not limited to erythema, scaling, itching, weeping, crusting, and pain. Benzoyl Peroxide Pregnancy And Lactation Text: This medication is Pregnancy Category C. It is unknown if benzoyl peroxide is excreted in breast milk. Cosentyx Pregnancy And Lactation Text: This medication is Pregnancy Category B and is considered safe during pregnancy. It is unknown if this medication is excreted in breast milk. Hydroxyzine Pregnancy And Lactation Text: This medication is not safe during pregnancy and should not be taken. It is also excreted in breast milk and breast feeding isn't recommended. Ketoconazole Pregnancy And Lactation Text: This medication is Pregnancy Category C and it isn't know if it is safe during pregnancy. It is also excreted in breast milk and breast feeding isn't recommended. Minoxidil Pregnancy And Lactation Text: This medication has not been assigned a Pregnancy Risk Category but animal studies failed to show danger with the topical medication. It is unknown if the medication is excreted in breast milk. Clindamycin Pregnancy And Lactation Text: This medication can be used in pregnancy if certain situations. Clindamycin is also present in breast milk. Arava Pregnancy And Lactation Text: This medication is Pregnancy Category X and is absolutely contraindicated during pregnancy. It is unknown if it is excreted in breast milk. Propranolol Pregnancy And Lactation Text: This medication is Pregnancy Category C and it isn't known if it is safe during pregnancy. It is excreted in breast milk. Topical Clindamycin Counseling: Patient counseled that this medication may cause skin irritation or allergic reactions.  In the event of skin irritation, the patient was advised to reduce the amount of the drug applied or use it less frequently.   The patient verbalized understanding of the proper use and possible adverse effects of clindamycin.  All of the patient's questions and concerns were addressed. Gabapentin Pregnancy And Lactation Text: This medication is Pregnancy Category C and isn't considered safe during pregnancy. It is excreted in breast milk. Cosentyx Counseling:  I discussed with the patient the risks of Cosentyx including but not limited to worsening of Crohn's disease, immunosuppression, allergic reactions and infections.  The patient understands that monitoring is required including a PPD at baseline and must alert us or the primary physician if symptoms of infection or other concerning signs are noted. Topical Sulfur Applications Counseling: Topical Sulfur Counseling: Patient counseled that this medication may cause skin irritation or allergic reactions.  In the event of skin irritation, the patient was advised to reduce the amount of the drug applied or use it less frequently.   The patient verbalized understanding of the proper use and possible adverse effects of topical sulfur application.  All of the patient's questions and concerns were addressed. Opioid Counseling: I discussed with the patient the potential side effects of opioids including but not limited to addiction, altered mental status, and depression. I stressed avoiding alcohol, benzodiazepines, muscle relaxants and sleep aids unless specifically okayed by a physician. The patient verbalized understanding of the proper use and possible adverse effects of opioids. All of the patient's questions and concerns were addressed. They were instructed to flush the remaining pills down the toilet if they did not need them for pain. Tazorac Counseling:  Patient advised that medication is irritating and drying.  Patient may need to apply sparingly and wash off after an hour before eventually leaving it on overnight.  The patient verbalized understanding of the proper use and possible adverse effects of tazorac.  All of the patient's questions and concerns were addressed. Itraconazole Counseling:  I discussed with the patient the risks of itraconazole including but not limited to liver damage, nausea/vomiting, neuropathy, and severe allergy.  The patient understands that this medication is best absorbed when taken with acidic beverages such as non-diet cola or ginger ale.  The patient understands that monitoring is required including baseline LFTs and repeat LFTs at intervals.  The patient understands that they are to contact us or the primary physician if concerning signs are noted. Griseofulvin Pregnancy And Lactation Text: This medication is Pregnancy Category X and is known to cause serious birth defects. It is unknown if this medication is excreted in breast milk but breast feeding should be avoided. Finasteride Pregnancy And Lactation Text: This medication is absolutely contraindicated during pregnancy. It is unknown if it is excreted in breast milk. Valtrex Pregnancy And Lactation Text: this medication is Pregnancy Category B and is considered safe during pregnancy. This medication is not directly found in breast milk but it's metabolite acyclovir is present. SSKI Counseling:  I discussed with the patient the risks of SSKI including but not limited to thyroid abnormalities, metallic taste, GI upset, fever, headache, acne, arthralgias, paraesthesias, lymphadenopathy, easy bleeding, arrhythmias, and allergic reaction. Acitretin Counseling:  I discussed with the patient the risks of acitretin including but not limited to hair loss, dry lips/skin/eyes, liver damage, hyperlipidemia, depression/suicidal ideation, photosensitivity.  Serious rare side effects can include but are not limited to pancreatitis, pseudotumor cerebri, bony changes, clot formation/stroke/heart attack.  Patient understands that alcohol is contraindicated since it can result in liver toxicity and significantly prolong the elimination of the drug by many years. Bexarotene Counseling:  I discussed with the patient the risks of bexarotene including but not limited to hair loss, dry lips/skin/eyes, liver abnormalities, hyperlipidemia, pancreatitis, depression/suicidal ideation, photosensitivity, drug rash/allergic reactions, hypothyroidism, anemia, leukopenia, infection, cataracts, and teratogenicity.  Patient understands that they will need regular blood tests to check lipid profile, liver function tests, white blood cell count, thyroid function tests and pregnancy test if applicable. High Dose Vitamin A Counseling: Side effects reviewed, pt to contact office should one occur. Erythromycin Counseling:  I discussed with the patient the risks of erythromycin including but not limited to GI upset, allergic reaction, drug rash, diarrhea, increase in liver enzymes, and yeast infections. Cellcept Pregnancy And Lactation Text: This medication is Pregnancy Category D and isn't considered safe during pregnancy. It is unknown if this medication is excreted in breast milk. Wartpeel Pregnancy And Lactation Text: This medication is Pregnancy Category X and contraindicated in pregnancy and in women who may become pregnant. It is unknown if this medication is excreted in breast milk. Otezla Counseling: The side effects of Otezla were discussed with the patient, including but not limited to worsening or new depression, weight loss, diarrhea, nausea, upper respiratory tract infection, and headache. Patient instructed to call the office should any adverse effect occur.  The patient verbalized understanding of the proper use and possible adverse effects of Otezla.  All the patient's questions and concerns were addressed. Rhofade Pregnancy And Lactation Text: This medication has not been assigned a Pregnancy Risk Category. It is unknown if the medication is excreted in breast milk. Rifampin Counseling: I discussed with the patient the risks of rifampin including but not limited to liver damage, kidney damage, red-orange body fluids, nausea/vomiting and severe allergy. Cephalexin Pregnancy And Lactation Text: This medication is Pregnancy Category B and considered safe during pregnancy.  It is also excreted in breast milk but can be used safely for shorter doses. Oxybutynin Pregnancy And Lactation Text: This medication is Pregnancy Category B and is considered safe during pregnancy. It is unknown if it is excreted in breast milk. Rituxan Pregnancy And Lactation Text: This medication is Pregnancy Category C and it isn't know if it is safe during pregnancy. It is unknown if this medication is excreted in breast milk but similar antibodies are known to be excreted. Ilumya Counseling: I discussed with the patient the risks of tildrakizumab including but not limited to immunosuppression, malignancy, posterior leukoencephalopathy syndrome, and serious infections.  The patient understands that monitoring is required including a PPD at baseline and must alert us or the primary physician if symptoms of infection or other concerning signs are noted. Hydroquinone Counseling:  Patient advised that medication may result in skin irritation, lightening (hypopigmentation), dryness, and burning.  In the event of skin irritation, the patient was advised to reduce the amount of the drug applied or use it less frequently.  Rarely, spots that are treated with hydroquinone can become darker (pseudoochronosis).  Should this occur, patient instructed to stop medication and call the office. The patient verbalized understanding of the proper use and possible adverse effects of hydroquinone.  All of the patient's questions and concerns were addressed. Topical Retinoid Pregnancy And Lactation Text: This medication is Pregnancy Category C. It is unknown if this medication is excreted in breast milk. Fluconazole Pregnancy And Lactation Text: This medication is Pregnancy Category C and it isn't know if it is safe during pregnancy. It is also excreted in breast milk. Wartpeel Counseling:  I discussed with the patient the risks of Wartpeel including but not limited to erythema, scaling, itching, weeping, crusting, and pain. Doxycycline Counseling:  Patient counseled regarding possible photosensitivity and increased risk for sunburn.  Patient instructed to avoid sunlight, if possible.  When exposed to sunlight, patients should wear protective clothing, sunglasses, and sunscreen.  The patient was instructed to call the office immediately if the following severe adverse effects occur:  hearing changes, easy bruising/bleeding, severe headache, or vision changes.  The patient verbalized understanding of the proper use and possible adverse effects of doxycycline.  All of the patient's questions and concerns were addressed. Odomzo Counseling- I discussed with the patient the risks of Odomzo including but not limited to nausea, vomiting, diarrhea, constipation, weight loss, changes in the sense of taste, decreased appetite, muscle spasms, and hair loss.  The patient verbalized understanding of the proper use and possible adverse effects of Odomzo.  All of the patient's questions and concerns were addressed. Infliximab Counseling:  I discussed with the patient the risks of infliximab including but not limited to myelosuppression, immunosuppression, autoimmune hepatitis, demyelinating diseases, lymphoma, and serious infections.  The patient understands that monitoring is required including a PPD at baseline and must alert us or the primary physician if symptoms of infection or other concerning signs are noted. Tetracycline Pregnancy And Lactation Text: This medication is Pregnancy Category D and not consider safe during pregnancy. It is also excreted in breast milk. Eucrisa Counseling: Patient may experience a mild burning sensation during topical application. Eucrisa is not approved in children less than 2 years of age. Protopic Counseling: Patient may experience a mild burning sensation during topical application. Protopic is not approved in children less than 2 years of age. There have been case reports of hematologic and skin malignancies in patients using topical calcineurin inhibitors although causality is questionable. Zyclara Counseling:  I discussed with the patient the risks of imiquimod including but not limited to erythema, scaling, itching, weeping, crusting, and pain.  Patient understands that the inflammatory response to imiquimod is variable from person to person and was educated regarded proper titration schedule.  If flu-like symptoms develop, patient knows to discontinue the medication and contact us. Benzoyl Peroxide Counseling: Patient counseled that medicine may cause skin irritation and bleach clothing.  In the event of skin irritation, the patient was advised to reduce the amount of the drug applied or use it less frequently.   The patient verbalized understanding of the proper use and possible adverse effects of benzoyl peroxide.  All of the patient's questions and concerns were addressed. Solaraze Pregnancy And Lactation Text: This medication is Pregnancy Category B and is considered safe. There is some data to suggest avoiding during the third trimester. It is unknown if this medication is excreted in breast milk. Birth Control Pills Counseling: Birth Control Pill Counseling: I discussed with the patient the potential side effects of OCPs including but not limited to increased risk of stroke, heart attack, thrombophlebitis, deep venous thrombosis, hepatic adenomas, breast changes, GI upset, headaches, and depression.  The patient verbalized understanding of the proper use and possible adverse effects of OCPs. All of the patient's questions and concerns were addressed. Siliq Counseling:  I discussed with the patient the risks of Siliq including but not limited to new or worsening depression, suicidal thoughts and behavior, immunosuppression, malignancy, posterior leukoencephalopathy syndrome, and serious infections.  The patient understands that monitoring is required including a PPD at baseline and must alert us or the primary physician if symptoms of infection or other concerning signs are noted. There is also a special program designed to monitor depression which is required with Siliq. Drysol Pregnancy And Lactation Text: This medication is considered safe during pregnancy and breast feeding. Minoxidil Counseling: Minoxidil is a topical medication which can increase blood flow where it is applied. It is uncertain how this medication increases hair growth. Side effects are uncommon and include stinging and allergic reactions. Azathioprine Counseling:  I discussed with the patient the risks of azathioprine including but not limited to myelosuppression, immunosuppression, hepatotoxicity, lymphoma, and infections.  The patient understands that monitoring is required including baseline LFTs, Creatinine, possible TPMP genotyping and weekly CBCs for the first month and then every 2 weeks thereafter.  The patient verbalized understanding of the proper use and possible adverse effects of azathioprine.  All of the patient's questions and concerns were addressed. Include Pregnancy/Lactation Warning?: No Dapsone Counseling: I discussed with the patient the risks of dapsone including but not limited to hemolytic anemia, agranulocytosis, rashes, methemoglobinemia, kidney failure, peripheral neuropathy, headaches, GI upset, and liver toxicity.  Patients who start dapsone require monitoring including baseline LFTs and weekly CBCs for the first month, then every month thereafter.  The patient verbalized understanding of the proper use and possible adverse effects of dapsone.  All of the patient's questions and concerns were addressed. Mirvaso Counseling: Mirvaso is a topical medication which can decrease superficial blood flow where applied. Side effects are uncommon and include stinging, redness and allergic reactions. Opioid Pregnancy And Lactation Text: These medications can lead to premature delivery and should be avoided during pregnancy. These medications are also present in breast milk in small amounts. Metronidazole Pregnancy And Lactation Text: This medication is Pregnancy Category B and considered safe during pregnancy.  It is also excreted in breast milk. Cyclosporine Pregnancy And Lactation Text: This medication is Pregnancy Category C and it isn't know if it is safe during pregnancy. This medication is excreted in breast milk. Niacinamide Counseling: I recommended taking niacin or niacinamide, also know as vitamin B3, twice daily. Recent evidence suggests that taking vitamin B3 (500 mg twice daily) can reduce the risk of actinic keratoses and non-melanoma skin cancers. Side effects of vitamin B3 include flushing and headache. Rifampin Pregnancy And Lactation Text: This medication is Pregnancy Category C and it isn't know if it is safe during pregnancy. It is also excreted in breast milk and should not be used if you are breast feeding. Solaraze Counseling:  I discussed with the patient the risks of Solaraze including but not limited to erythema, scaling, itching, weeping, crusting, and pain. Sski Pregnancy And Lactation Text: This medication is Pregnancy Category D and isn't considered safe during pregnancy. It is excreted in breast milk. Otezla Pregnancy And Lactation Text: This medication is Pregnancy Category C and it isn't known if it is safe during pregnancy. It is unknown if it is excreted in breast milk. Erivedge Counseling- I discussed with the patient the risks of Erivedge including but not limited to nausea, vomiting, diarrhea, constipation, weight loss, changes in the sense of taste, decreased appetite, muscle spasms, and hair loss.  The patient verbalized understanding of the proper use and possible adverse effects of Erivedge.  All of the patient's questions and concerns were addressed. Xolair Pregnancy And Lactation Text: This medication is Pregnancy Category B and is considered safe during pregnancy. This medication is excreted in breast milk. Thalidomide Counseling: I discussed with the patient the risks of thalidomide including but not limited to birth defects, anxiety, weakness, chest pain, dizziness, cough and severe allergy. Terbinafine Pregnancy And Lactation Text: This medication is Pregnancy Category B and is considered safe during pregnancy. It is also excreted in breast milk and breast feeding isn't recommended. Methotrexate Counseling:  Patient counseled regarding adverse effects of methotrexate including but not limited to nausea, vomiting, abnormalities in liver function tests. Patients may develop mouth sores, rash, diarrhea, and abnormalities in blood counts. The patient understands that monitoring is required including LFT's and blood counts.  There is a rare possibility of scarring of the liver and lung problems that can occur when taking methotrexate. Persistent nausea, loss of appetite, pale stools, dark urine, cough, and shortness of breath should be reported immediately. Patient advised to discontinue methotrexate treatment at least three months before attempting to become pregnant.  I discussed the need for folate supplements while taking methotrexate.  These supplements can decrease side effects during methotrexate treatment. The patient verbalized understanding of the proper use and possible adverse effects of methotrexate.  All of the patient's questions and concerns were addressed. Valtrex Counseling: I discussed with the patient the risks of valacyclovir including but not limited to kidney damage, nausea, vomiting and severe allergy.  The patient understands that if the infection seems to be worsening or is not improving, they are to call. Hydroxychloroquine Counseling:  I discussed with the patient that a baseline ophthalmologic exam is needed at the start of therapy and every year thereafter while on therapy. A CBC may also be warranted for monitoring.  The side effects of this medication were discussed with the patient, including but not limited to agranulocytosis, aplastic anemia, seizures, rashes, retinopathy, and liver toxicity. Patient instructed to call the office should any adverse effect occur.  The patient verbalized understanding of the proper use and possible adverse effects of Plaquenil.  All the patient's questions and concerns were addressed. Imiquimod Counseling:  I discussed with the patient the risks of imiquimod including but not limited to erythema, scaling, itching, weeping, crusting, and pain.  Patient understands that the inflammatory response to imiquimod is variable from person to person and was educated regarded proper titration schedule.  If flu-like symptoms develop, patient knows to discontinue the medication and contact us. Fluconazole Counseling:  Patient counseled regarding adverse effects of fluconazole including but not limited to headache, diarrhea, nausea, upset stomach, liver function test abnormalities, taste disturbance, and stomach pain.  There is a rare possibility of liver failure that can occur when taking fluconazole.  The patient understands that monitoring of LFTs and kidney function test may be required, especially at baseline. The patient verbalized understanding of the proper use and possible adverse effects of fluconazole.  All of the patient's questions and concerns were addressed. Methotrexate Pregnancy And Lactation Text: This medication is Pregnancy Category X and is known to cause fetal harm. This medication is excreted in breast milk. Isotretinoin Pregnancy And Lactation Text: This medication is Pregnancy Category X and is considered extremely dangerous during pregnancy. It is unknown if it is excreted in breast milk. Tremfya Counseling: I discussed with the patient the risks of guselkumab including but not limited to immunosuppression, serious infections, worsening of inflammatory bowel disease and drug reactions.  The patient understands that monitoring is required including a PPD at baseline and must alert us or the primary physician if symptoms of infection or other concerning signs are noted. Tetracycline Counseling: Patient counseled regarding possible photosensitivity and increased risk for sunburn.  Patient instructed to avoid sunlight, if possible.  When exposed to sunlight, patients should wear protective clothing, sunglasses, and sunscreen.  The patient was instructed to call the office immediately if the following severe adverse effects occur:  hearing changes, easy bruising/bleeding, severe headache, or vision changes.  The patient verbalized understanding of the proper use and possible adverse effects of tetracycline.  All of the patient's questions and concerns were addressed. Patient understands to avoid pregnancy while on therapy due to potential birth defects. Cimzia Pregnancy And Lactation Text: This medication crosses the placenta but can be considered safe in certain situations. Cimzia may be excreted in breast milk. Drysol Counseling:  I discussed with the patient the risks of drysol/aluminum chloride including but not limited to skin rash, itching, irritation, burning. Spironolactone Pregnancy And Lactation Text: This medication can cause feminization of the male fetus and should be avoided during pregnancy. The active metabolite is also found in breast milk. Cephalexin Counseling: I counseled the patient regarding use of cephalexin as an antibiotic for prophylactic and/or therapeutic purposes. Cephalexin (commonly prescribed under brand name Keflex) is a cephalosporin antibiotic which is active against numerous classes of bacteria, including most skin bacteria. Side effects may include nausea, diarrhea, gastrointestinal upset, rash, hives, yeast infections, and in rare cases, hepatitis, kidney disease, seizures, fever, confusion, neurologic symptoms, and others. Patients with severe allergies to penicillin medications are cautioned that there is about a 10% incidence of cross-reactivity with cephalosporins. When possible, patients with penicillin allergies should use alternatives to cephalosporins for antibiotic therapy. Doxepin Pregnancy And Lactation Text: This medication is Pregnancy Category C and it isn't known if it is safe during pregnancy. It is also excreted in breast milk and breast feeding isn't recommended. Elidel Counseling: Patient may experience a mild burning sensation during topical application. Elidel is not approved in children less than 2 years of age. There have been case reports of hematologic and skin malignancies in patients using topical calcineurin inhibitors although causality is questionable. Bactrim Counseling:  I discussed with the patient the risks of sulfa antibiotics including but not limited to GI upset, allergic reaction, drug rash, diarrhea, dizziness, photosensitivity, and yeast infections.  Rarely, more serious reactions can occur including but not limited to aplastic anemia, agranulocytosis, methemoglobinemia, blood dyscrasias, liver or kidney failure, lung infiltrates or desquamative/blistering drug rashes. Acitretin Pregnancy And Lactation Text: This medication is Pregnancy Category X and should not be given to women who are pregnant or may become pregnant in the future. This medication is excreted in breast milk. Hydroxyzine Counseling: Patient advised that the medication is sedating and not to drive a car after taking this medication.  Patient informed of potential adverse effects including but not limited to dry mouth, urinary retention, and blurry vision.  The patient verbalized understanding of the proper use and possible adverse effects of hydroxyzine.  All of the patient's questions and concerns were addressed. Simponi Counseling:  I discussed with the patient the risks of golimumab including but not limited to myelosuppression, immunosuppression, autoimmune hepatitis, demyelinating diseases, lymphoma, and serious infections.  The patient understands that monitoring is required including a PPD at baseline and must alert us or the primary physician if symptoms of infection or other concerning signs are noted. Glycopyrrolate Counseling:  I discussed with the patient the risks of glycopyrrolate including but not limited to skin rash, drowsiness, dry mouth, difficulty urinating, and blurred vision. Topical Sulfur Applications Pregnancy And Lactation Text: This medication is Pregnancy Category C and has an unknown safety profile during pregnancy. It is unknown if this topical medication is excreted in breast milk. Carac Counseling:  I discussed with the patient the risks of Carac including but not limited to erythema, scaling, itching, weeping, crusting, and pain. Detail Level: Simple Azithromycin Pregnancy And Lactation Text: This medication is considered safe during pregnancy and is also secreted in breast milk. Nsaids Pregnancy And Lactation Text: These medications are considered safe up to 30 weeks gestation. It is excreted in breast milk. Dapsone Pregnancy And Lactation Text: This medication is Pregnancy Category C and is not considered safe during pregnancy or breast feeding. Azithromycin Counseling:  I discussed with the patient the risks of azithromycin including but not limited to GI upset, allergic reaction, drug rash, diarrhea, and yeast infections. Doxycycline Pregnancy And Lactation Text: This medication is Pregnancy Category D and not consider safe during pregnancy. It is also excreted in breast milk but is considered safe for shorter treatment courses. High Dose Vitamin A Pregnancy And Lactation Text: High dose vitamin A therapy is contraindicated during pregnancy and breast feeding. Quinolones Counseling:  I discussed with the patient the risks of fluoroquinolones including but not limited to GI upset, allergic reaction, drug rash, diarrhea, dizziness, photosensitivity, yeast infections, liver function test abnormalities, tendonitis/tendon rupture. Tazorac Pregnancy And Lactation Text: This medication is not safe during pregnancy. It is unknown if this medication is excreted in breast milk. Clindamycin Counseling: I counseled the patient regarding use of clindamycin as an antibiotic for prophylactic and/or therapeutic purposes. Clindamycin is active against numerous classes of bacteria, including skin bacteria. Side effects may include nausea, diarrhea, gastrointestinal upset, rash, hives, yeast infections, and in rare cases, colitis. Gabapentin Counseling: I discussed with the patient the risks of gabapentin including but not limited to dizziness, somnolence, fatigue and ataxia. Arava Counseling:  Patient counseled regarding adverse effects of Arava including but not limited to nausea, vomiting, abnormalities in liver function tests. Patients may develop mouth sores, rash, diarrhea, and abnormalities in blood counts. The patient understands that monitoring is required including LFTs and blood counts.  There is a rare possibility of scarring of the liver and lung problems that can occur when taking methotrexate. Persistent nausea, loss of appetite, pale stools, dark urine, cough, and shortness of breath should be reported immediately. Patient advised to discontinue Arava treatment and consult with a physician prior to attempting conception. The patient will have to undergo a treatment to eliminate Arava from the body prior to conception. Prednisone Counseling:  I discussed with the patient the risks of prolonged use of prednisone including but not limited to weight gain, insomnia, osteoporosis, mood changes, diabetes, susceptibility to infection, glaucoma and high blood pressure.  In cases where prednisone use is prolonged, patients should be monitored with blood pressure checks, serum glucose levels and an eye exam.  Additionally, the patient may need to be placed on GI prophylaxis, PCP prophylaxis, and calcium and vitamin D supplementation and/or a bisphosphonate.  The patient verbalized understanding of the proper use and the possible adverse effects of prednisone.  All of the patient's questions and concerns were addressed. Clofazimine Counseling:  I discussed with the patient the risks of clofazimine including but not limited to skin and eye pigmentation, liver damage, nausea/vomiting, gastrointestinal bleeding and allergy. Ivermectin Counseling:  Patient instructed to take medication on an empty stomach with a full glass of water.  Patient informed of potential adverse effects including but not limited to nausea, diarrhea, dizziness, itching, and swelling of the extremities or lymph nodes.  The patient verbalized understanding of the proper use and possible adverse effects of ivermectin.  All of the patient's questions and concerns were addressed. Picato Counseling:  I discussed with the patient the risks of Picato including but not limited to erythema, scaling, itching, weeping, crusting, and pain. Doxepin Counseling:  Patient advised that the medication is sedating and not to drive a car after taking this medication. Patient informed of potential adverse effects including but not limited to dry mouth, urinary retention, and blurry vision.  The patient verbalized understanding of the proper use and possible adverse effects of doxepin.  All of the patient's questions and concerns were addressed. Oxybutynin Counseling:  I discussed with the patient the risks of oxybutynin including but not limited to skin rash, drowsiness, dry mouth, difficulty urinating, and blurred vision. Colchicine Counseling:  Patient counseled regarding adverse effects including but not limited to stomach upset (nausea, vomiting, stomach pain, or diarrhea).  Patient instructed to limit alcohol consumption while taking this medication.  Colchicine may reduce blood counts especially with prolonged use.  The patient understands that monitoring of kidney function and blood counts may be required, especially at baseline. The patient verbalized understanding of the proper use and possible adverse effects of colchicine.  All of the patient's questions and concerns were addressed. Griseofulvin Counseling:  I discussed with the patient the risks of griseofulvin including but not limited to photosensitivity, cytopenia, liver damage, nausea/vomiting and severe allergy.  The patient understands that this medication is best absorbed when taken with a fatty meal (e.g., ice cream or french fries). Isotretinoin Counseling: Patient should get monthly blood tests, not donate blood, not drive at night if vision affected, not share medication, and not undergo elective surgery for 6 months after tx completed. Side effects reviewed, pt to contact office should one occur. Niacinamide Pregnancy And Lactation Text: These medications are considered safe during pregnancy. Topical Retinoid counseling:  Patient advised to apply a pea-sized amount only at bedtime and wait 30 minutes after washing their face before applying.  If too drying, patient may add a non-comedogenic moisturizer. The patient verbalized understanding of the proper use and possible adverse effects of retinoids.  All of the patient's questions and concerns were addressed. Cyclosporine Counseling:  I discussed with the patient the risks of cyclosporine including but not limited to hypertension, gingival hyperplasia,myelosuppression, immunosuppression, liver damage, kidney damage, neurotoxicity, lymphoma, and serious infections. The patient understands that monitoring is required including baseline blood pressure, CBC, CMP, lipid panel and uric acid, and then 1-2 times monthly CMP and blood pressure. Dupixent Pregnancy And Lactation Text: This medication likely crosses the placenta but the risk for the fetus is uncertain. This medication is excreted in breast milk. Finasteride Male Counseling: Finasteride Counseling:  I discussed with the patient the risks of use of finasteride including but not limited to decreased libido, decreased ejaculate volume, gynecomastia, and depression. Women should not handle medication.  All of the patient's questions and concerns were addressed. Xeljanz Counseling: I discussed with the patient the risks of Xeljanz therapy including increased risk of infection, liver issues, headache, diarrhea, or cold symptoms. Live vaccines should be avoided. They were instructed to call if they have any problems. Cimzia Counseling:  I discussed with the patient the risks of Cimzia including but not limited to immunosuppression, allergic reactions and infections.  The patient understands that monitoring is required including a PPD at baseline and must alert us or the primary physician if symptoms of infection or other concerning signs are noted. Bexarotene Pregnancy And Lactation Text: This medication is Pregnancy Category X and should not be given to women who are pregnant or may become pregnant. This medication should not be used if you are breast feeding. Nsaids Counseling: NSAID Counseling: I discussed with the patient that NSAIDs should be taken with food. Prolonged use of NSAIDs can result in the development of stomach ulcers.  Patient advised to stop taking NSAIDs if abdominal pain occurs.  The patient verbalized understanding of the proper use and possible adverse effects of NSAIDs.  All of the patient's questions and concerns were addressed. Cellcept Counseling:  I discussed with the patient the risks of mycophenolate mofetil including but not limited to infection/immunosuppression, GI upset, hypokalemia, hypercholesterolemia, bone marrow suppression, lymphoproliferative disorders, malignancy, GI ulceration/bleed/perforation, colitis, interstitial lung disease, kidney failure, progressive multifocal leukoencephalopathy, and birth defects.  The patient understands that monitoring is required including a baseline creatinine and regular CBC testing. In addition, patient must alert us immediately if symptoms of infection or other concerning signs are noted. Erythromycin Pregnancy And Lactation Text: This medication is Pregnancy Category B and is considered safe during pregnancy. It is also excreted in breast milk. Cyclophosphamide Counseling:  I discussed with the patient the risks of cyclophosphamide including but not limited to hair loss, hormonal abnormalities, decreased fertility, abdominal pain, diarrhea, nausea and vomiting, bone marrow suppression and infection. The patient understands that monitoring is required while taking this medication. Xeleliasz Pregnancy And Lactation Text: This medication is Pregnancy Category D and is not considered safe during pregnancy.  The risk during breast feeding is also uncertain. Glycopyrrolate Pregnancy And Lactation Text: This medication is Pregnancy Category B and is considered safe during pregnancy. It is unknown if it is excreted breast milk. Humira Counseling:  I discussed with the patient the risks of adalimumab including but not limited to myelosuppression, immunosuppression, autoimmune hepatitis, demyelinating diseases, lymphoma, and serious infections.  The patient understands that monitoring is required including a PPD at baseline and must alert us or the primary physician if symptoms of infection or other concerning signs are noted. Hydroxychloroquine Pregnancy And Lactation Text: This medication has been shown to cause fetal harm but it isn't assigned a Pregnancy Risk Category. There are small amounts excreted in breast milk. Ketoconazole Counseling:   Patient counseled regarding improving absorption with orange juice.  Adverse effects include but are not limited to breast enlargement, headache, diarrhea, nausea, upset stomach, liver function test abnormalities, taste disturbance, and stomach pain.  There is a rare possibility of liver failure that can occur when taking ketoconazole. The patient understands that monitoring of LFTs may be required, especially at baseline. The patient verbalized understanding of the proper use and possible adverse effects of ketoconazole.  All of the patient's questions and concerns were addressed. Cimetidine Counseling:  I discussed with the patient the risks of Cimetidine including but not limited to gynecomastia, headache, diarrhea, nausea, drowsiness, arrhythmias, pancreatitis, skin rashes, psychosis, bone marrow suppression and kidney toxicity. Albendazole Counseling:  I discussed with the patient the risks of albendazole including but not limited to cytopenia, kidney damage, nausea/vomiting and severe allergy.  The patient understands that this medication is being used in an off-label manner. Rhofade Counseling: Rhofade is a topical medication which can decrease superficial blood flow where applied. Side effects are uncommon and include stinging, redness and allergic reactions.

## 2021-03-15 NOTE — H&P ADULT - PROBLEM SELECTOR PLAN 6
Continue outpatient treatment regimen Plavix recommended to be held x 2 weeks  Will start on ASA 325BID x 2 weeks post operatively per Dr. 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 Austin

## 2021-03-15 NOTE — H&P ADULT - PROBLEM SELECTOR PLAN 4
Insulin sliding scale to be ordered while inpatient.   Consistent Carb diet  Will monitor FS Continue methotrexate postoperatively Continue methotrexate postoperatively  Stress dose 50mg IV prednisone, c/w 25mg q8 post op x24 hrs , then resume PO regimen

## 2021-03-15 NOTE — H&P ADULT - NSICDXPASTSURGICALHX_GEN_ALL_CORE_FT
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

## 2021-03-15 NOTE — H&P ADULT - NSHPPHYSICALEXAM_GEN_ALL_CORE
PE: Decreased ROM to right hip secondary to pain      Rest of PE per medical clearance. Gen: 75 y/o male, no distress  MSK: Decreased ROM secondary to pain at the right hip   calves soft, nontender bilaterally   sensation intact to light touch bilateral upper/lower extremities  DP 2+,  brisk capillary refill  EHL/FHL/TA/GS 5/5 bilaterally     Rest of PE per MD clearance

## 2021-03-16 ENCOUNTER — INPATIENT (INPATIENT)
Facility: HOSPITAL | Age: 76
LOS: 1 days | Discharge: HOME HEALTH | DRG: 470 | End: 2021-03-18
Attending: ORTHOPAEDIC SURGERY | Admitting: ORTHOPAEDIC SURGERY
Payer: MEDICARE

## 2021-03-16 DIAGNOSIS — I25.10 ATHEROSCLEROTIC HEART DISEASE OF NATIVE CORONARY ARTERY WITHOUT ANGINA PECTORIS: ICD-10-CM

## 2021-03-16 DIAGNOSIS — Z86.79 PERSONAL HISTORY OF OTHER DISEASES OF THE CIRCULATORY SYSTEM: Chronic | ICD-10-CM

## 2021-03-16 DIAGNOSIS — Z98.89 OTHER SPECIFIED POSTPROCEDURAL STATES: Chronic | ICD-10-CM

## 2021-03-16 DIAGNOSIS — I63.9 CEREBRAL INFARCTION, UNSPECIFIED: ICD-10-CM

## 2021-03-16 DIAGNOSIS — Z95.1 PRESENCE OF AORTOCORONARY BYPASS GRAFT: Chronic | ICD-10-CM

## 2021-03-16 DIAGNOSIS — M48.00 SPINAL STENOSIS, SITE UNSPECIFIED: ICD-10-CM

## 2021-03-16 DIAGNOSIS — L40.50 ARTHROPATHIC PSORIASIS, UNSPECIFIED: ICD-10-CM

## 2021-03-16 DIAGNOSIS — M71.21 SYNOVIAL CYST OF POPLITEAL SPACE [BAKER], RIGHT KNEE: Chronic | ICD-10-CM

## 2021-03-16 DIAGNOSIS — G47.33 OBSTRUCTIVE SLEEP APNEA (ADULT) (PEDIATRIC): ICD-10-CM

## 2021-03-16 DIAGNOSIS — M16.11 UNILATERAL PRIMARY OSTEOARTHRITIS, RIGHT HIP: ICD-10-CM

## 2021-03-16 DIAGNOSIS — E11.9 TYPE 2 DIABETES MELLITUS WITHOUT COMPLICATIONS: ICD-10-CM

## 2021-03-16 DIAGNOSIS — I10 ESSENTIAL (PRIMARY) HYPERTENSION: ICD-10-CM

## 2021-03-16 DIAGNOSIS — F41.9 ANXIETY DISORDER, UNSPECIFIED: ICD-10-CM

## 2021-03-16 LAB
GLUCOSE BLDC GLUCOMTR-MCNC: 123 MG/DL — HIGH (ref 70–99)
GLUCOSE BLDC GLUCOMTR-MCNC: 136 MG/DL — HIGH (ref 70–99)
GLUCOSE BLDC GLUCOMTR-MCNC: 150 MG/DL — HIGH (ref 70–99)

## 2021-03-16 PROCEDURE — 72170 X-RAY EXAM OF PELVIS: CPT | Mod: 26

## 2021-03-16 RX ORDER — SODIUM CHLORIDE 9 MG/ML
1000 INJECTION, SOLUTION INTRAVENOUS
Refills: 0 | Status: DISCONTINUED | OUTPATIENT
Start: 2021-03-16 | End: 2021-03-18

## 2021-03-16 RX ORDER — SODIUM CHLORIDE 9 MG/ML
1000 INJECTION, SOLUTION INTRAVENOUS
Refills: 0 | Status: DISCONTINUED | OUTPATIENT
Start: 2021-03-17 | End: 2021-03-18

## 2021-03-16 RX ORDER — TAMSULOSIN HYDROCHLORIDE 0.4 MG/1
0.4 CAPSULE ORAL AT BEDTIME
Refills: 0 | Status: DISCONTINUED | OUTPATIENT
Start: 2021-03-16 | End: 2021-03-18

## 2021-03-16 RX ORDER — OXYCODONE HYDROCHLORIDE 5 MG/1
5 TABLET ORAL
Refills: 0 | Status: DISCONTINUED | OUTPATIENT
Start: 2021-03-16 | End: 2021-03-18

## 2021-03-16 RX ORDER — PANTOPRAZOLE SODIUM 20 MG/1
40 TABLET, DELAYED RELEASE ORAL
Refills: 0 | Status: DISCONTINUED | OUTPATIENT
Start: 2021-03-16 | End: 2021-03-18

## 2021-03-16 RX ORDER — MAGNESIUM HYDROXIDE 400 MG/1
30 TABLET, CHEWABLE ORAL DAILY
Refills: 0 | Status: DISCONTINUED | OUTPATIENT
Start: 2021-03-16 | End: 2021-03-18

## 2021-03-16 RX ORDER — DEXTROSE 50 % IN WATER 50 %
15 SYRINGE (ML) INTRAVENOUS ONCE
Refills: 0 | Status: DISCONTINUED | OUTPATIENT
Start: 2021-03-16 | End: 2021-03-18

## 2021-03-16 RX ORDER — TRAMADOL HYDROCHLORIDE 50 MG/1
50 TABLET ORAL EVERY 6 HOURS
Refills: 0 | Status: DISCONTINUED | OUTPATIENT
Start: 2021-03-16 | End: 2021-03-18

## 2021-03-16 RX ORDER — CHLORHEXIDINE GLUCONATE 213 G/1000ML
1 SOLUTION TOPICAL ONCE
Refills: 0 | Status: COMPLETED | OUTPATIENT
Start: 2021-03-16 | End: 2021-03-16

## 2021-03-16 RX ORDER — SENNA PLUS 8.6 MG/1
2 TABLET ORAL AT BEDTIME
Refills: 0 | Status: DISCONTINUED | OUTPATIENT
Start: 2021-03-16 | End: 2021-03-18

## 2021-03-16 RX ORDER — HYDROMORPHONE HYDROCHLORIDE 2 MG/ML
0.5 INJECTION INTRAMUSCULAR; INTRAVENOUS; SUBCUTANEOUS EVERY 4 HOURS
Refills: 0 | Status: DISCONTINUED | OUTPATIENT
Start: 2021-03-16 | End: 2021-03-18

## 2021-03-16 RX ORDER — ATORVASTATIN CALCIUM 80 MG/1
80 TABLET, FILM COATED ORAL AT BEDTIME
Refills: 0 | Status: DISCONTINUED | OUTPATIENT
Start: 2021-03-16 | End: 2021-03-18

## 2021-03-16 RX ORDER — GLUCAGON INJECTION, SOLUTION 0.5 MG/.1ML
1 INJECTION, SOLUTION SUBCUTANEOUS ONCE
Refills: 0 | Status: DISCONTINUED | OUTPATIENT
Start: 2021-03-16 | End: 2021-03-18

## 2021-03-16 RX ORDER — ASPIRIN/CALCIUM CARB/MAGNESIUM 324 MG
325 TABLET ORAL
Refills: 0 | Status: COMPLETED | OUTPATIENT
Start: 2021-03-16 | End: 2021-03-17

## 2021-03-16 RX ORDER — DEXTROSE 50 % IN WATER 50 %
25 SYRINGE (ML) INTRAVENOUS ONCE
Refills: 0 | Status: DISCONTINUED | OUTPATIENT
Start: 2021-03-16 | End: 2021-03-18

## 2021-03-16 RX ORDER — HYDROCORTISONE 20 MG
50 TABLET ORAL EVERY 8 HOURS
Refills: 0 | Status: COMPLETED | OUTPATIENT
Start: 2021-03-16 | End: 2021-03-17

## 2021-03-16 RX ORDER — ACETAMINOPHEN 500 MG
975 TABLET ORAL EVERY 8 HOURS
Refills: 0 | Status: DISCONTINUED | OUTPATIENT
Start: 2021-03-16 | End: 2021-03-18

## 2021-03-16 RX ORDER — DEXTROSE 50 % IN WATER 50 %
12.5 SYRINGE (ML) INTRAVENOUS ONCE
Refills: 0 | Status: DISCONTINUED | OUTPATIENT
Start: 2021-03-16 | End: 2021-03-18

## 2021-03-16 RX ORDER — ONDANSETRON 8 MG/1
4 TABLET, FILM COATED ORAL EVERY 6 HOURS
Refills: 0 | Status: DISCONTINUED | OUTPATIENT
Start: 2021-03-16 | End: 2021-03-18

## 2021-03-16 RX ORDER — CELECOXIB 200 MG/1
400 CAPSULE ORAL ONCE
Refills: 0 | Status: COMPLETED | OUTPATIENT
Start: 2021-03-16 | End: 2021-03-16

## 2021-03-16 RX ORDER — ACETAMINOPHEN 500 MG
1000 TABLET ORAL ONCE
Refills: 0 | Status: COMPLETED | OUTPATIENT
Start: 2021-03-16 | End: 2021-03-16

## 2021-03-16 RX ORDER — CEFAZOLIN SODIUM 1 G
2000 VIAL (EA) INJECTION EVERY 8 HOURS
Refills: 0 | Status: COMPLETED | OUTPATIENT
Start: 2021-03-16 | End: 2021-03-17

## 2021-03-16 RX ORDER — INSULIN LISPRO 100/ML
VIAL (ML) SUBCUTANEOUS
Refills: 0 | Status: DISCONTINUED | OUTPATIENT
Start: 2021-03-16 | End: 2021-03-18

## 2021-03-16 RX ORDER — ZOLPIDEM TARTRATE 10 MG/1
5 TABLET ORAL AT BEDTIME
Refills: 0 | Status: DISCONTINUED | OUTPATIENT
Start: 2021-03-16 | End: 2021-03-18

## 2021-03-16 RX ORDER — POLYETHYLENE GLYCOL 3350 17 G/17G
17 POWDER, FOR SOLUTION ORAL AT BEDTIME
Refills: 0 | Status: DISCONTINUED | OUTPATIENT
Start: 2021-03-16 | End: 2021-03-18

## 2021-03-16 RX ORDER — INSULIN LISPRO 100/ML
VIAL (ML) SUBCUTANEOUS AT BEDTIME
Refills: 0 | Status: DISCONTINUED | OUTPATIENT
Start: 2021-03-16 | End: 2021-03-18

## 2021-03-16 RX ORDER — DULOXETINE HYDROCHLORIDE 30 MG/1
20 CAPSULE, DELAYED RELEASE ORAL DAILY
Refills: 0 | Status: DISCONTINUED | OUTPATIENT
Start: 2021-03-16 | End: 2021-03-18

## 2021-03-16 RX ORDER — HYDROMORPHONE HYDROCHLORIDE 2 MG/ML
0.5 INJECTION INTRAMUSCULAR; INTRAVENOUS; SUBCUTANEOUS
Refills: 0 | Status: COMPLETED | OUTPATIENT
Start: 2021-03-16 | End: 2021-03-23

## 2021-03-16 RX ORDER — HYDROMORPHONE HYDROCHLORIDE 2 MG/ML
0.5 INJECTION INTRAMUSCULAR; INTRAVENOUS; SUBCUTANEOUS
Refills: 0 | Status: DISCONTINUED | OUTPATIENT
Start: 2021-03-16 | End: 2021-03-18

## 2021-03-16 RX ADMIN — TAMSULOSIN HYDROCHLORIDE 0.4 MILLIGRAM(S): 0.4 CAPSULE ORAL at 22:13

## 2021-03-16 RX ADMIN — OXYCODONE HYDROCHLORIDE 5 MILLIGRAM(S): 5 TABLET ORAL at 22:13

## 2021-03-16 RX ADMIN — HYDROMORPHONE HYDROCHLORIDE 0.5 MILLIGRAM(S): 2 INJECTION INTRAMUSCULAR; INTRAVENOUS; SUBCUTANEOUS at 19:59

## 2021-03-16 RX ADMIN — HYDROMORPHONE HYDROCHLORIDE 0.5 MILLIGRAM(S): 2 INJECTION INTRAMUSCULAR; INTRAVENOUS; SUBCUTANEOUS at 19:44

## 2021-03-16 RX ADMIN — HYDROMORPHONE HYDROCHLORIDE 0.5 MILLIGRAM(S): 2 INJECTION INTRAMUSCULAR; INTRAVENOUS; SUBCUTANEOUS at 20:01

## 2021-03-16 RX ADMIN — Medication 100 MILLIGRAM(S): at 20:50

## 2021-03-16 RX ADMIN — HYDROMORPHONE HYDROCHLORIDE 0.5 MILLIGRAM(S): 2 INJECTION INTRAMUSCULAR; INTRAVENOUS; SUBCUTANEOUS at 16:20

## 2021-03-16 RX ADMIN — Medication 975 MILLIGRAM(S): at 23:16

## 2021-03-16 RX ADMIN — Medication 1 APPLICATION(S): at 10:40

## 2021-03-16 RX ADMIN — HYDROMORPHONE HYDROCHLORIDE 0.5 MILLIGRAM(S): 2 INJECTION INTRAMUSCULAR; INTRAVENOUS; SUBCUTANEOUS at 16:35

## 2021-03-16 RX ADMIN — Medication 975 MILLIGRAM(S): at 22:13

## 2021-03-16 RX ADMIN — Medication 50 MILLIGRAM(S): at 20:49

## 2021-03-16 RX ADMIN — ATORVASTATIN CALCIUM 80 MILLIGRAM(S): 80 TABLET, FILM COATED ORAL at 22:13

## 2021-03-16 RX ADMIN — CELECOXIB 400 MILLIGRAM(S): 200 CAPSULE ORAL at 10:58

## 2021-03-16 RX ADMIN — HYDROMORPHONE HYDROCHLORIDE 0.5 MILLIGRAM(S): 2 INJECTION INTRAMUSCULAR; INTRAVENOUS; SUBCUTANEOUS at 20:16

## 2021-03-16 RX ADMIN — CHLORHEXIDINE GLUCONATE 1 APPLICATION(S): 213 SOLUTION TOPICAL at 10:30

## 2021-03-16 RX ADMIN — OXYCODONE HYDROCHLORIDE 5 MILLIGRAM(S): 5 TABLET ORAL at 18:26

## 2021-03-16 RX ADMIN — CELECOXIB 400 MILLIGRAM(S): 200 CAPSULE ORAL at 10:59

## 2021-03-16 RX ADMIN — Medication 1000 MILLIGRAM(S): at 10:59

## 2021-03-16 RX ADMIN — OXYCODONE HYDROCHLORIDE 5 MILLIGRAM(S): 5 TABLET ORAL at 19:11

## 2021-03-16 RX ADMIN — OXYCODONE HYDROCHLORIDE 5 MILLIGRAM(S): 5 TABLET ORAL at 23:16

## 2021-03-16 NOTE — PHYSICAL THERAPY INITIAL EVALUATION ADULT - PERTINENT HX OF CURRENT PROBLEM, REHAB EVAL
76M with right hip pain x 10 years. Denies known injury. Reports hip pain has progressed since onset. Patient notes stiffness, pain in his R groin, limited ROM. Notes h/o lumbar laminectomy/discectomy 6 yrs ago.Patient notes failure of conservative management for right hip pain including oral analgesics, activity modification. Patient denies recent illness, fevers. Takes plavix for h/o stroke/bypass. Takes prednisone/methotrexate for psoriatic arthritis

## 2021-03-16 NOTE — PHYSICAL THERAPY INITIAL EVALUATION ADULT - ADDITIONAL COMMENTS
Pt lives in a family house with his Wife and has steps to enter home. Pt reports to use RW and SC to ambulate prior to sx.

## 2021-03-17 ENCOUNTER — TRANSCRIPTION ENCOUNTER (OUTPATIENT)
Age: 76
End: 2021-03-17

## 2021-03-17 LAB
A1C WITH ESTIMATED AVERAGE GLUCOSE RESULT: 6.9 % — HIGH (ref 4–5.6)
ANION GAP SERPL CALC-SCNC: 8 MMOL/L — SIGNIFICANT CHANGE UP (ref 5–17)
BUN SERPL-MCNC: 18 MG/DL — SIGNIFICANT CHANGE UP (ref 7–23)
CALCIUM SERPL-MCNC: 8.5 MG/DL — SIGNIFICANT CHANGE UP (ref 8.4–10.5)
CHLORIDE SERPL-SCNC: 103 MMOL/L — SIGNIFICANT CHANGE UP (ref 96–108)
CO2 SERPL-SCNC: 25 MMOL/L — SIGNIFICANT CHANGE UP (ref 22–31)
CREAT SERPL-MCNC: 0.98 MG/DL — SIGNIFICANT CHANGE UP (ref 0.5–1.3)
ESTIMATED AVERAGE GLUCOSE: 151 MG/DL — HIGH (ref 68–114)
GLUCOSE BLDC GLUCOMTR-MCNC: 130 MG/DL — HIGH (ref 70–99)
GLUCOSE BLDC GLUCOMTR-MCNC: 174 MG/DL — HIGH (ref 70–99)
GLUCOSE BLDC GLUCOMTR-MCNC: 176 MG/DL — HIGH (ref 70–99)
GLUCOSE BLDC GLUCOMTR-MCNC: 234 MG/DL — HIGH (ref 70–99)
GLUCOSE SERPL-MCNC: 129 MG/DL — HIGH (ref 70–99)
HCT VFR BLD CALC: 33.4 % — LOW (ref 39–50)
HGB BLD-MCNC: 10.6 G/DL — LOW (ref 13–17)
MCHC RBC-ENTMCNC: 29.4 PG — SIGNIFICANT CHANGE UP (ref 27–34)
MCHC RBC-ENTMCNC: 31.7 GM/DL — LOW (ref 32–36)
MCV RBC AUTO: 92.8 FL — SIGNIFICANT CHANGE UP (ref 80–100)
NRBC # BLD: 0 /100 WBCS — SIGNIFICANT CHANGE UP (ref 0–0)
PLATELET # BLD AUTO: 225 K/UL — SIGNIFICANT CHANGE UP (ref 150–400)
POTASSIUM SERPL-MCNC: 4.2 MMOL/L — SIGNIFICANT CHANGE UP (ref 3.5–5.3)
POTASSIUM SERPL-SCNC: 4.2 MMOL/L — SIGNIFICANT CHANGE UP (ref 3.5–5.3)
RBC # BLD: 3.6 M/UL — LOW (ref 4.2–5.8)
RBC # FLD: 16 % — HIGH (ref 10.3–14.5)
SODIUM SERPL-SCNC: 136 MMOL/L — SIGNIFICANT CHANGE UP (ref 135–145)
WBC # BLD: 11.81 K/UL — HIGH (ref 3.8–10.5)
WBC # FLD AUTO: 11.81 K/UL — HIGH (ref 3.8–10.5)

## 2021-03-17 PROCEDURE — 99223 1ST HOSP IP/OBS HIGH 75: CPT

## 2021-03-17 RX ORDER — LOSARTAN POTASSIUM 100 MG/1
1 TABLET, FILM COATED ORAL
Qty: 0 | Refills: 0 | DISCHARGE

## 2021-03-17 RX ORDER — ONDANSETRON 8 MG/1
1 TABLET, FILM COATED ORAL
Qty: 9 | Refills: 0
Start: 2021-03-17 | End: 2021-03-19

## 2021-03-17 RX ORDER — ASPIRIN/CALCIUM CARB/MAGNESIUM 324 MG
325 TABLET ORAL
Refills: 0 | Status: DISCONTINUED | OUTPATIENT
Start: 2021-03-17 | End: 2021-03-18

## 2021-03-17 RX ORDER — METOPROLOL TARTRATE 50 MG
12.5 TABLET ORAL
Refills: 0 | Status: DISCONTINUED | OUTPATIENT
Start: 2021-03-17 | End: 2021-03-17

## 2021-03-17 RX ORDER — DOCUSATE SODIUM 100 MG
1 CAPSULE ORAL
Qty: 28 | Refills: 0
Start: 2021-03-17 | End: 2021-03-30

## 2021-03-17 RX ORDER — OXYCODONE HYDROCHLORIDE 5 MG/1
1 TABLET ORAL
Qty: 42 | Refills: 0
Start: 2021-03-17 | End: 2021-03-23

## 2021-03-17 RX ORDER — CLOPIDOGREL BISULFATE 75 MG/1
1 TABLET, FILM COATED ORAL
Qty: 30 | Refills: 0
Start: 2021-03-17 | End: 2021-04-15

## 2021-03-17 RX ORDER — METOPROLOL TARTRATE 50 MG
0.5 TABLET ORAL
Qty: 0 | Refills: 0 | DISCHARGE

## 2021-03-17 RX ORDER — HYDROCHLOROTHIAZIDE 25 MG
25 TABLET ORAL DAILY
Refills: 0 | Status: DISCONTINUED | OUTPATIENT
Start: 2021-03-17 | End: 2021-03-17

## 2021-03-17 RX ORDER — ACETAMINOPHEN 500 MG
2 TABLET ORAL
Qty: 112 | Refills: 0
Start: 2021-03-17 | End: 2021-03-30

## 2021-03-17 RX ORDER — SECUKINUMAB 150 MG/ML
300 INJECTION SUBCUTANEOUS
Qty: 0 | Refills: 0 | DISCHARGE

## 2021-03-17 RX ORDER — ZOLPIDEM TARTRATE 10 MG/1
1 TABLET ORAL
Qty: 0 | Refills: 0 | DISCHARGE

## 2021-03-17 RX ORDER — GABAPENTIN 400 MG/1
100 CAPSULE ORAL THREE TIMES A DAY
Refills: 0 | Status: DISCONTINUED | OUTPATIENT
Start: 2021-03-17 | End: 2021-03-17

## 2021-03-17 RX ORDER — METOPROLOL TARTRATE 50 MG
25 TABLET ORAL DAILY
Refills: 0 | Status: DISCONTINUED | OUTPATIENT
Start: 2021-03-17 | End: 2021-03-17

## 2021-03-17 RX ORDER — LOSARTAN POTASSIUM 100 MG/1
100 TABLET, FILM COATED ORAL DAILY
Refills: 0 | Status: DISCONTINUED | OUTPATIENT
Start: 2021-03-17 | End: 2021-03-18

## 2021-03-17 RX ORDER — PANTOPRAZOLE SODIUM 20 MG/1
1 TABLET, DELAYED RELEASE ORAL
Qty: 30 | Refills: 0
Start: 2021-03-17 | End: 2021-04-15

## 2021-03-17 RX ORDER — ASPIRIN/CALCIUM CARB/MAGNESIUM 324 MG
1 TABLET ORAL
Qty: 28 | Refills: 0
Start: 2021-03-17 | End: 2021-03-30

## 2021-03-17 RX ORDER — TRAMADOL HYDROCHLORIDE 50 MG/1
1 TABLET ORAL
Qty: 28 | Refills: 0
Start: 2021-03-17 | End: 2021-03-23

## 2021-03-17 RX ORDER — GABAPENTIN 400 MG/1
1 CAPSULE ORAL
Qty: 28 | Refills: 0
Start: 2021-03-17 | End: 2021-03-30

## 2021-03-17 RX ADMIN — Medication 975 MILLIGRAM(S): at 14:19

## 2021-03-17 RX ADMIN — TRAMADOL HYDROCHLORIDE 50 MILLIGRAM(S): 50 TABLET ORAL at 16:30

## 2021-03-17 RX ADMIN — SENNA PLUS 2 TABLET(S): 8.6 TABLET ORAL at 21:00

## 2021-03-17 RX ADMIN — Medication 5 MILLIGRAM(S): at 11:14

## 2021-03-17 RX ADMIN — ATORVASTATIN CALCIUM 80 MILLIGRAM(S): 80 TABLET, FILM COATED ORAL at 21:00

## 2021-03-17 RX ADMIN — ZOLPIDEM TARTRATE 5 MILLIGRAM(S): 10 TABLET ORAL at 23:22

## 2021-03-17 RX ADMIN — Medication 2: at 18:25

## 2021-03-17 RX ADMIN — HYDROMORPHONE HYDROCHLORIDE 0.5 MILLIGRAM(S): 2 INJECTION INTRAMUSCULAR; INTRAVENOUS; SUBCUTANEOUS at 21:00

## 2021-03-17 RX ADMIN — ZOLPIDEM TARTRATE 5 MILLIGRAM(S): 10 TABLET ORAL at 00:40

## 2021-03-17 RX ADMIN — OXYCODONE HYDROCHLORIDE 5 MILLIGRAM(S): 5 TABLET ORAL at 12:00

## 2021-03-17 RX ADMIN — SODIUM CHLORIDE 150 MILLILITER(S): 9 INJECTION, SOLUTION INTRAVENOUS at 02:41

## 2021-03-17 RX ADMIN — Medication 50 MILLIGRAM(S): at 06:07

## 2021-03-17 RX ADMIN — Medication 325 MILLIGRAM(S): at 06:07

## 2021-03-17 RX ADMIN — Medication 975 MILLIGRAM(S): at 22:00

## 2021-03-17 RX ADMIN — OXYCODONE HYDROCHLORIDE 5 MILLIGRAM(S): 5 TABLET ORAL at 11:14

## 2021-03-17 RX ADMIN — OXYCODONE HYDROCHLORIDE 5 MILLIGRAM(S): 5 TABLET ORAL at 14:18

## 2021-03-17 RX ADMIN — TRAMADOL HYDROCHLORIDE 50 MILLIGRAM(S): 50 TABLET ORAL at 15:38

## 2021-03-17 RX ADMIN — Medication 4: at 11:50

## 2021-03-17 RX ADMIN — LOSARTAN POTASSIUM 100 MILLIGRAM(S): 100 TABLET, FILM COATED ORAL at 11:15

## 2021-03-17 RX ADMIN — Medication 975 MILLIGRAM(S): at 21:00

## 2021-03-17 RX ADMIN — Medication 975 MILLIGRAM(S): at 07:07

## 2021-03-17 RX ADMIN — OXYCODONE HYDROCHLORIDE 5 MILLIGRAM(S): 5 TABLET ORAL at 14:55

## 2021-03-17 RX ADMIN — Medication 1 MILLIGRAM(S): at 00:40

## 2021-03-17 RX ADMIN — Medication 25 MILLIGRAM(S): at 21:00

## 2021-03-17 RX ADMIN — OXYCODONE HYDROCHLORIDE 5 MILLIGRAM(S): 5 TABLET ORAL at 18:09

## 2021-03-17 RX ADMIN — Medication 975 MILLIGRAM(S): at 14:55

## 2021-03-17 RX ADMIN — POLYETHYLENE GLYCOL 3350 17 GRAM(S): 17 POWDER, FOR SOLUTION ORAL at 21:05

## 2021-03-17 RX ADMIN — OXYCODONE HYDROCHLORIDE 5 MILLIGRAM(S): 5 TABLET ORAL at 19:00

## 2021-03-17 RX ADMIN — Medication 1 MILLIGRAM(S): at 23:22

## 2021-03-17 RX ADMIN — DULOXETINE HYDROCHLORIDE 20 MILLIGRAM(S): 30 CAPSULE, DELAYED RELEASE ORAL at 11:14

## 2021-03-17 RX ADMIN — Medication 325 MILLIGRAM(S): at 18:09

## 2021-03-17 RX ADMIN — Medication 975 MILLIGRAM(S): at 06:07

## 2021-03-17 RX ADMIN — TAMSULOSIN HYDROCHLORIDE 0.4 MILLIGRAM(S): 0.4 CAPSULE ORAL at 21:05

## 2021-03-17 RX ADMIN — Medication 100 MILLIGRAM(S): at 04:13

## 2021-03-17 RX ADMIN — HYDROMORPHONE HYDROCHLORIDE 0.5 MILLIGRAM(S): 2 INJECTION INTRAMUSCULAR; INTRAVENOUS; SUBCUTANEOUS at 21:15

## 2021-03-17 RX ADMIN — PANTOPRAZOLE SODIUM 40 MILLIGRAM(S): 20 TABLET, DELAYED RELEASE ORAL at 06:07

## 2021-03-17 NOTE — DISCHARGE NOTE PROVIDER - HOSPITAL COURSE
Admitted  Surgery - right total hip replacement  Dejah-op Antibiotics  Pain control  DVT prophylaxis  OOB/Physical Therapy

## 2021-03-17 NOTE — DISCHARGE NOTE PROVIDER - NSDCFUSCHEDAPPT_GEN_ALL_CORE_FT
LANRE EDMOND ; 03/26/2021 ; [facility]  [address]  LANRE EDMOND ; 03/26/2021 ; [facility] PreAdmits

## 2021-03-17 NOTE — DISCHARGE NOTE PROVIDER - NSDCMRMEDTOKEN_GEN_ALL_CORE_FT
Ambien 5 mg oral tablet: 1 tab(s) orally once a day (at bedtime), As Needed insomnia  aspirin 81 mg oral delayed release tablet: 1 tab(s) orally once a day  atorvastatin 80 mg oral tablet: 1 tab(s) orally once a day (at bedtime)  azelastine 137 mcg/inh (0.1%) nasal spray: 2 spray(s) nasal 2 times a day  clopidogrel 75 mg oral tablet: 1 tab(s) orally every 24 hours  Cosentyx: 300 milligram(s) subcutaneous every 4 weeks  Cymbalta 20 mg oral delayed release capsule: 1 cap(s) orally once a day (at bedtime)  Modesta-C 1000 mg oral tablet: 1 tab(s) orally once a day  famotidine 40 mg oral tablet: 1 tab(s) orally once a day (at bedtime)  Flonase 50 mcg/inh nasal spray: 1 spray(s) nasal once a day  folic acid 1 mg oral tablet: 1 tab(s) orally once a day  LORazepam 1 mg oral tablet: orally once a day (at bedtime)  losartan 100 mg oral tablet: 1 tab(s) orally once a day  metFORMIN 500 mg oral tablet: 1 tab(s) orally once a day   methotrexate: 17.5 milligram(s) orally once a week  Multiple Vitamins oral tablet: 1 tab(s) orally once a day  oxycodone-acetaminophen 5 mg-325 mg oral tablet: 1 tab(s) orally every 6 hours, As Needed -Severe Pain (7 - 10) MDD:4 tablets  polyethylene glycol 3350 oral powder for reconstitution: 17 gram(s) orally once a day, As Needed   predniSONE 5 mg oral tablet: 1 tab(s) orally once a day  tamsulosin 0.4 mg oral capsule: 1 cap(s) orally once a day   acetaminophen 325 mg oral tablet: 2 tab(s) orally every 6 hours  aspirin 325 mg oral delayed release tablet: 1 tab(s) orally 2 times a day for 2 weeks  atorvastatin 80 mg oral tablet: 1 tab(s) orally once a day (at bedtime)  azelastine 137 mcg/inh (0.1%) nasal spray: 2 spray(s) nasal 2 times a day  clopidogrel 75 mg oral tablet: 1 tab(s) orally every 24 hours. **DO NOT RESTART UNTIL 2 WEEKS AFTER SURGERY ON 3/30 ** (ALSO RESTART ASPIRIN 81MG AT THIS TIME)  Colace 100 mg oral capsule: 1 cap(s) orally 2 times a day   Cymbalta 20 mg oral delayed release capsule: 1 cap(s) orally once a day (at bedtime)  Modesta-C 1000 mg oral tablet: 1 tab(s) orally once a day  Flonase 50 mcg/inh nasal spray: 1 spray(s) nasal once a day  folic acid 1 mg oral tablet: 1 tab(s) orally once a day  gabapentin 100 mg oral capsule: 1 cap(s) orally 2 times a day   LORazepam 1 mg oral tablet: 1 tab(s) orally once a day (at bedtime)  losartan 100 mg oral tablet: 1 tab(s) orally once a day  losartan 100 mg oral tablet: 1 tab(s) orally once a day  metFORMIN 500 mg oral tablet: 1 tab(s) orally once a day   methotrexate: 17.5 milligram(s) orally once a week  Multiple Vitamins oral tablet: 1 tab(s) orally once a day  ondansetron 4 mg oral tablet: 1 tab(s) orally 3 times a day, as needed for nausea MDD:3  oxyCODONE 5 mg oral tablet: 1 tab(s) orally every 4 hours, As Needed -Severe Pain (7 - 10) MDD:6  pantoprazole 40 mg oral delayed release tablet: 1 tab(s) orally once a day (before a meal)  polyethylene glycol 3350 oral powder for reconstitution: 17 gram(s) orally once a day, As Needed   predniSONE 5 mg oral tablet: 1 tab(s) orally once a day  tamsulosin 0.4 mg oral capsule: 1 cap(s) orally once a day  traMADol 50 mg oral tablet: 1 tab(s) orally every 6 hours, As needed, Moderate Pain (4 - 6) MDD:4   acetaminophen 325 mg oral tablet: 2 tab(s) orally every 6 hours  aspirin 325 mg oral delayed release tablet: 1 tab(s) orally 2 times a day for 2 weeks  atorvastatin 80 mg oral tablet: 1 tab(s) orally once a day (at bedtime)  azelastine 137 mcg/inh (0.1%) nasal spray: 2 spray(s) nasal 2 times a day  clopidogrel 75 mg oral tablet: 1 tab(s) orally every 24 hours. **DO NOT RESTART UNTIL 2 WEEKS AFTER SURGERY ON 3/30 ** (ALSO RESTART ASPIRIN 81MG AT THIS TIME)  Colace 100 mg oral capsule: 1 cap(s) orally 2 times a day   Cymbalta 20 mg oral delayed release capsule: 1 cap(s) orally once a day (at bedtime)  Modesta-C 1000 mg oral tablet: 1 tab(s) orally once a day  Flonase 50 mcg/inh nasal spray: 1 spray(s) nasal once a day  folic acid 1 mg oral tablet: 1 tab(s) orally once a day  gabapentin 100 mg oral capsule: 1 cap(s) orally 2 times a day   LORazepam 1 mg oral tablet: 1 tab(s) orally once a day (at bedtime)  losartan 100 mg oral tablet: 1 tab(s) orally once a day  metFORMIN 500 mg oral tablet: 1 tab(s) orally once a day   methotrexate: 17.5 milligram(s) orally once a week  Multiple Vitamins oral tablet: 1 tab(s) orally once a day  ondansetron 4 mg oral tablet: 1 tab(s) orally 3 times a day, as needed for nausea MDD:3  oxyCODONE 5 mg oral tablet: 1 tab(s) orally every 4 hours, As Needed -Severe Pain (7 - 10) MDD:6  pantoprazole 40 mg oral delayed release tablet: 1 tab(s) orally once a day (before a meal)  polyethylene glycol 3350 oral powder for reconstitution: 17 gram(s) orally once a day, As Needed   predniSONE 5 mg oral tablet: 1 tab(s) orally once a day  tamsulosin 0.4 mg oral capsule: 1 cap(s) orally once a day  traMADol 50 mg oral tablet: 1 tab(s) orally every 6 hours, As needed, Moderate Pain (4 - 6) MDD:4   acetaminophen 325 mg oral tablet: 2 tab(s) orally every 6 hours  aspirin 325 mg oral delayed release tablet: 1 tab(s) orally 2 times a day for 2 weeks  atorvastatin 80 mg oral tablet: 1 tab(s) orally once a day (at bedtime)  azelastine 137 mcg/inh (0.1%) nasal spray: 2 spray(s) nasal 2 times a day  clopidogrel 75 mg oral tablet: 1 tab(s) orally every 24 hours. **DO NOT RESTART UNTIL 2 WEEKS AFTER SURGERY ON 3/30 ** (ALSO RESTART ASPIRIN 81MG AT THIS TIME)  Colace 100 mg oral capsule: 1 cap(s) orally 2 times a day   Cymbalta 20 mg oral delayed release capsule: 1 cap(s) orally once a day (at bedtime)  Modesta-C 1000 mg oral tablet: 1 tab(s) orally once a day  Flonase 50 mcg/inh nasal spray: 1 spray(s) nasal once a day  folic acid 1 mg oral tablet: 1 tab(s) orally once a day  LORazepam 1 mg oral tablet: 1 tab(s) orally once a day (at bedtime)  losartan 100 mg oral tablet: 1 tab(s) orally once a day  metFORMIN 500 mg oral tablet: 1 tab(s) orally once a day   methotrexate: 17.5 milligram(s) orally once a week  Multiple Vitamins oral tablet: 1 tab(s) orally once a day  ondansetron 4 mg oral tablet: 1 tab(s) orally 3 times a day, as needed for nausea MDD:3  oxyCODONE 5 mg oral tablet: 1 tab(s) orally every 4 hours, As Needed -Severe Pain (7 - 10) MDD:6  pantoprazole 40 mg oral delayed release tablet: 1 tab(s) orally once a day (before a meal)  polyethylene glycol 3350 oral powder for reconstitution: 17 gram(s) orally once a day, As Needed   predniSONE 5 mg oral tablet: 1 tab(s) orally once a day  pregabalin 25 mg oral capsule: 1 cap(s) orally 3 times a day MDD:3  tamsulosin 0.4 mg oral capsule: 1 cap(s) orally once a day  traMADol 50 mg oral tablet: 1 tab(s) orally every 6 hours, As needed, Moderate Pain (4 - 6) MDD:4

## 2021-03-17 NOTE — PROGRESS NOTE ADULT - SUBJECTIVE AND OBJECTIVE BOX
Patient is a 76y old  Male who presents with a chief complaint of right hip pain (17 Mar 2021 09:17)      INTERVAL HPI/OVERNIGHT EVENTS:    Review of Systems: 12 point review of systems otherwise negative    MEDICATIONS  (STANDING):  acetaminophen   Tablet .. 975 milliGRAM(s) Oral every 8 hours  aspirin enteric coated 325 milliGRAM(s) Oral two times a day  atorvastatin 80 milliGRAM(s) Oral at bedtime  dextrose 40% Gel 15 Gram(s) Oral once  dextrose 5%. 1000 milliLiter(s) (50 mL/Hr) IV Continuous <Continuous>  dextrose 5%. 1000 milliLiter(s) (100 mL/Hr) IV Continuous <Continuous>  dextrose 50% Injectable 25 Gram(s) IV Push once  dextrose 50% Injectable 12.5 Gram(s) IV Push once  dextrose 50% Injectable 25 Gram(s) IV Push once  DULoxetine 20 milliGRAM(s) Oral daily  gabapentin 100 milliGRAM(s) Oral three times a day  glucagon  Injectable 1 milliGRAM(s) IntraMuscular once  insulin lispro (ADMELOG) corrective regimen sliding scale   SubCutaneous three times a day before meals  insulin lispro (ADMELOG) corrective regimen sliding scale   SubCutaneous at bedtime  lactated ringers. 1000 milliLiter(s) (150 mL/Hr) IV Continuous <Continuous>  LORazepam     Tablet 1 milliGRAM(s) Oral at bedtime  losartan 100 milliGRAM(s) Oral daily  pantoprazole    Tablet 40 milliGRAM(s) Oral before breakfast  polyethylene glycol 3350 17 Gram(s) Oral at bedtime  predniSONE   Tablet 5 milliGRAM(s) Oral daily  senna 2 Tablet(s) Oral at bedtime  tamsulosin 0.4 milliGRAM(s) Oral at bedtime    MEDICATIONS  (PRN):  aluminum hydroxide/magnesium hydroxide/simethicone Suspension 30 milliLiter(s) Oral four times a day PRN Indigestion  HYDROmorphone  Injectable 0.5 milliGRAM(s) IV Push every 15 minutes PRN Severe Pain (7 - 10)  HYDROmorphone  Injectable 0.5 milliGRAM(s) IV Push every 4 hours PRN Severe Pain (7 - 10)  magnesium hydroxide Suspension 30 milliLiter(s) Oral daily PRN Constipation  ondansetron Injectable 4 milliGRAM(s) IV Push every 6 hours PRN Nausea and/or Vomiting  oxyCODONE    IR 5 milliGRAM(s) Oral every 3 hours PRN Severe Pain (7 - 10)  traMADol 50 milliGRAM(s) Oral every 6 hours PRN Moderate Pain (4 - 6)  zolpidem 5 milliGRAM(s) Oral at bedtime PRN Insomnia      Allergies    No Known Allergies    Intolerances          Vital Signs Last 24 Hrs  T(C): 36.4 (17 Mar 2021 13:26), Max: 36.4 (16 Mar 2021 22:10)  T(F): 97.6 (17 Mar 2021 13:26), Max: 97.6 (16 Mar 2021 22:10)  HR: 82 (17 Mar 2021 13:26) (58 - 82)  BP: 155/68 (17 Mar 2021 13:26) (119/73 - 189/77)  BP(mean): 93 (16 Mar 2021 20:21) (90 - 111)  RR: 17 (17 Mar 2021 13:26) (12 - 26)  SpO2: 97% (17 Mar 2021 13:26) (94% - 100%)  CAPILLARY BLOOD GLUCOSE      POCT Blood Glucose.: 234 mg/dL (17 Mar 2021 11:43)  POCT Blood Glucose.: 130 mg/dL (17 Mar 2021 07:36)  POCT Blood Glucose.: 123 mg/dL (16 Mar 2021 21:56)      03-16 @ 07:01  -  03-17 @ 07:00  --------------------------------------------------------  IN: 1775 mL / OUT: 1750 mL / NET: 25 mL    03-17 @ 07:01  -  03-17 @ 15:31  --------------------------------------------------------  IN: 450 mL / OUT: 400 mL / NET: 50 mL        Physical Exam:    Daily     Daily   General:  Well appearing, NAD, not cachetic  HEENT:  Nonicteric, PERRLA  CV:  RRR, no murmur, no JVD  Lungs:  CTA B/L, no wheezes, rales, rhonchi  Abdomen:  Soft, non-tender, no distended, positive BS, no hepatosplenomegaly  Extremities:  2+ pulses, no c/c, no edema  Skin:  Warm and dry, no rashes  :  No caballero  Neuro:  AAOx3, non-focal, CN II-XII grossly intact  No Restraints    LABS:                        10.6   11.81 )-----------( 225      ( 17 Mar 2021 06:44 )             33.4     03-17    136  |  103  |  18  ----------------------------<  129<H>  4.2   |  25  |  0.98    Ca    8.5      17 Mar 2021 06:44                  Patient is a 76y old  Male who presents with a chief complaint of right hip pain (17 Mar 2021 09:17)      INTERVAL HPI/OVERNIGHT EVENTS:  pod#1    Subjective:  Patient doing well today, feels his pain is well controlled.  Tolerating PO well. Urinating normally and passing gas.   No BM yet.   Has no other concerns or complaints today.    Review of Systems: 12 point review of systems otherwise negative    MEDICATIONS  (STANDING):  acetaminophen   Tablet .. 975 milliGRAM(s) Oral every 8 hours  aspirin enteric coated 325 milliGRAM(s) Oral two times a day  atorvastatin 80 milliGRAM(s) Oral at bedtime  dextrose 40% Gel 15 Gram(s) Oral once  dextrose 5%. 1000 milliLiter(s) (50 mL/Hr) IV Continuous <Continuous>  dextrose 5%. 1000 milliLiter(s) (100 mL/Hr) IV Continuous <Continuous>  dextrose 50% Injectable 25 Gram(s) IV Push once  dextrose 50% Injectable 12.5 Gram(s) IV Push once  dextrose 50% Injectable 25 Gram(s) IV Push once  DULoxetine 20 milliGRAM(s) Oral daily  gabapentin 100 milliGRAM(s) Oral three times a day  glucagon  Injectable 1 milliGRAM(s) IntraMuscular once  insulin lispro (ADMELOG) corrective regimen sliding scale   SubCutaneous three times a day before meals  insulin lispro (ADMELOG) corrective regimen sliding scale   SubCutaneous at bedtime  lactated ringers. 1000 milliLiter(s) (150 mL/Hr) IV Continuous <Continuous>  LORazepam     Tablet 1 milliGRAM(s) Oral at bedtime  losartan 100 milliGRAM(s) Oral daily  pantoprazole    Tablet 40 milliGRAM(s) Oral before breakfast  polyethylene glycol 3350 17 Gram(s) Oral at bedtime  predniSONE   Tablet 5 milliGRAM(s) Oral daily  senna 2 Tablet(s) Oral at bedtime  tamsulosin 0.4 milliGRAM(s) Oral at bedtime    MEDICATIONS  (PRN):  aluminum hydroxide/magnesium hydroxide/simethicone Suspension 30 milliLiter(s) Oral four times a day PRN Indigestion  HYDROmorphone  Injectable 0.5 milliGRAM(s) IV Push every 15 minutes PRN Severe Pain (7 - 10)  HYDROmorphone  Injectable 0.5 milliGRAM(s) IV Push every 4 hours PRN Severe Pain (7 - 10)  magnesium hydroxide Suspension 30 milliLiter(s) Oral daily PRN Constipation  ondansetron Injectable 4 milliGRAM(s) IV Push every 6 hours PRN Nausea and/or Vomiting  oxyCODONE    IR 5 milliGRAM(s) Oral every 3 hours PRN Severe Pain (7 - 10)  traMADol 50 milliGRAM(s) Oral every 6 hours PRN Moderate Pain (4 - 6)  zolpidem 5 milliGRAM(s) Oral at bedtime PRN Insomnia      Allergies    No Known Allergies    Intolerances          Vital Signs Last 24 Hrs  T(C): 36.4 (17 Mar 2021 13:26), Max: 36.4 (16 Mar 2021 22:10)  T(F): 97.6 (17 Mar 2021 13:26), Max: 97.6 (16 Mar 2021 22:10)  HR: 82 (17 Mar 2021 13:26) (58 - 82)  BP: 155/68 (17 Mar 2021 13:26) (119/73 - 189/77)  BP(mean): 93 (16 Mar 2021 20:21) (90 - 111)  RR: 17 (17 Mar 2021 13:26) (12 - 26)  SpO2: 97% (17 Mar 2021 13:26) (94% - 100%)  CAPILLARY BLOOD GLUCOSE      POCT Blood Glucose.: 234 mg/dL (17 Mar 2021 11:43)  POCT Blood Glucose.: 130 mg/dL (17 Mar 2021 07:36)  POCT Blood Glucose.: 123 mg/dL (16 Mar 2021 21:56)      03-16 @ 07:01 - 03-17 @ 07:00  --------------------------------------------------------  IN: 1775 mL / OUT: 1750 mL / NET: 25 mL    03-17 @ 07:01  -  03-17 @ 15:31  --------------------------------------------------------  IN: 450 mL / OUT: 400 mL / NET: 50 mL        Physical Exam:    Daily     Daily   General:  Well appearing, NAD, not cachetic  HEENT:  Nonicteric, PERRLA  CV:  RRR, no murmur, no JVD  Lungs:  CTA B/L, no wheezes, rales, rhonchi  Abdomen:  Soft, non-tender, no distended, positive BS, no hepatosplenomegaly  Extremities:  2+ pulses, no c/c, no edema; right hip dsg c/d/i  Skin:  Warm and dry, no rashes  :  No caballero  Neuro:  AAOx3, non-focal, CN II-XII grossly intact  No Restraints    LABS:                        10.6   11.81 )-----------( 225      ( 17 Mar 2021 06:44 )             33.4     03-17    136  |  103  |  18  ----------------------------<  129<H>  4.2   |  25  |  0.98    Ca    8.5      17 Mar 2021 06:44

## 2021-03-17 NOTE — PROGRESS NOTE ADULT - SUBJECTIVE AND OBJECTIVE BOX
Ortho Note    Procedure: R JOSE  Surgeon: Dr. Austin     Pt comfortable without complaints, pain controlled  Denies CP, SOB, N/V, numbness/tingling     Vital Signs Last 24 Hrs  T(C): 36.4 (03-16-21 @ 22:10), Max: 36.4 (03-16-21 @ 22:10)  T(F): 97.6 (03-16-21 @ 22:10), Max: 97.6 (03-16-21 @ 22:10)  HR: 69 (03-16-21 @ 23:39) (60 - 69)  BP: 159/65 (03-16-21 @ 23:39) (119/73 - 189/77)  BP(mean): 93 (03-16-21 @ 20:21) (93 - 111)  RR: 18 (03-16-21 @ 23:39) (17 - 20)  SpO2: 96% (03-16-21 @ 23:39) (96% - 100%)  AVSS    General: Pt Alert and oriented, NAD  DSG C/D/I  Pulses: palpable DP  Sensation: SILT  Motor: 5/5 EHL/FHL/TA/GS        Post-op X-Ray:  hardware in place     A/P: 76yMale POD#0 s/p above procedure  - Stable  - Pain Control  - DVT ppx: aspirin  - Post op abx: ancef  - PT, WBS: WBAT    Ortho Pager [phone]

## 2021-03-17 NOTE — DISCHARGE NOTE PROVIDER - CARE PROVIDER_API CALL
Lalito Austin)  Orthopaedic Surgery  [address] [place] [address]  Phone: [phone]  Fax: [phone]  Follow Up Time:

## 2021-03-17 NOTE — DISCHARGE NOTE PROVIDER - NSDCFUADDINST_GEN_ALL_CORE_FT
**Please see Dr. Austin's paperwork in your chart**    Prescriptions have been sent to your pharmacy. It is very important that you take the pantoprazole while you are taking the narcotics (oxycodone and tramadol) to prevent abdominal discomfort and ulcers. The colace will help with constipation from taking the narcotics.    Dr. Austin's phone number is [phone]. Please call this number to make your appointment or for any questions.  **Please see Dr. Austin's paperwork in your chart**    Prescriptions have been sent to your pharmacy. It is very important that you take the pantoprazole while you are taking the narcotics (oxycodone and tramadol) to prevent abdominal discomfort and ulcers. The colace will help with constipation from taking the narcotics.    Dr. Austin's phone number is [phone]. Please call this number to make your appointment or for any questions.     ***Do not restart your plavix for 2 weeks after surgery. Take aspirin 325mg twice daily for these 2 weeks. Then, you can stop taking the 325mg twice daily and go back on your aspirin 81mg and plavix regimen.     Continue to take your home dose of prednisone.

## 2021-03-17 NOTE — DISCHARGE NOTE PROVIDER - NSDCACTIVITY_GEN_ALL_CORE
Do not drive or operate machinery/Showering allowed/Walking - Indoors allowed/No heavy lifting/straining

## 2021-03-17 NOTE — PROGRESS NOTE ADULT - SUBJECTIVE AND OBJECTIVE BOX
Ortho Post Op Check    Procedure: R JOSE  Surgeon: Dr. Austin     Pt comfortable without complaints, pain controlled  Denies CP, SOB, N/V, numbness/tingling     Vital Signs Last 24 Hrs  T(C): 36.4 (03-16-21 @ 22:10), Max: 36.4 (03-16-21 @ 22:10)  T(F): 97.6 (03-16-21 @ 22:10), Max: 97.6 (03-16-21 @ 22:10)  HR: 69 (03-16-21 @ 23:39) (60 - 69)  BP: 159/65 (03-16-21 @ 23:39) (119/73 - 189/77)  BP(mean): 93 (03-16-21 @ 20:21) (93 - 111)  RR: 18 (03-16-21 @ 23:39) (17 - 20)  SpO2: 96% (03-16-21 @ 23:39) (96% - 100%)  AVSS    General: Pt Alert and oriented, NAD  DSG C/D/I  Pulses: palpable DP  Sensation: SILT  Motor: 5/5 EHL/FHL/TA/GS        Post-op X-Ray:  hardware in place     A/P: 76yMale POD#0 s/p above procedure  - Stable  - Pain Control  - DVT ppx: aspirin  - Post op abx: ancef  - PT, WBS: WBAT    Ortho Pager [phone]

## 2021-03-18 ENCOUNTER — TRANSCRIPTION ENCOUNTER (OUTPATIENT)
Age: 76
End: 2021-03-18

## 2021-03-18 VITALS
TEMPERATURE: 97 F | HEART RATE: 82 BPM | DIASTOLIC BLOOD PRESSURE: 66 MMHG | SYSTOLIC BLOOD PRESSURE: 137 MMHG | RESPIRATION RATE: 16 BRPM | OXYGEN SATURATION: 94 %

## 2021-03-18 LAB
GLUCOSE BLDC GLUCOMTR-MCNC: 165 MG/DL — HIGH (ref 70–99)
GLUCOSE BLDC GLUCOMTR-MCNC: 205 MG/DL — HIGH (ref 70–99)

## 2021-03-18 PROCEDURE — C1776: CPT

## 2021-03-18 PROCEDURE — 83036 HEMOGLOBIN GLYCOSYLATED A1C: CPT

## 2021-03-18 PROCEDURE — 72170 X-RAY EXAM OF PELVIS: CPT

## 2021-03-18 PROCEDURE — 97161 PT EVAL LOW COMPLEX 20 MIN: CPT

## 2021-03-18 PROCEDURE — 82962 GLUCOSE BLOOD TEST: CPT

## 2021-03-18 PROCEDURE — 76000 FLUOROSCOPY <1 HR PHYS/QHP: CPT

## 2021-03-18 PROCEDURE — 85027 COMPLETE CBC AUTOMATED: CPT

## 2021-03-18 PROCEDURE — 86901 BLOOD TYPING SEROLOGIC RH(D): CPT

## 2021-03-18 PROCEDURE — 36415 COLL VENOUS BLD VENIPUNCTURE: CPT

## 2021-03-18 PROCEDURE — 80048 BASIC METABOLIC PNL TOTAL CA: CPT

## 2021-03-18 PROCEDURE — 97116 GAIT TRAINING THERAPY: CPT

## 2021-03-18 PROCEDURE — C1713: CPT

## 2021-03-18 PROCEDURE — 97530 THERAPEUTIC ACTIVITIES: CPT

## 2021-03-18 PROCEDURE — 86900 BLOOD TYPING SEROLOGIC ABO: CPT

## 2021-03-18 PROCEDURE — 99233 SBSQ HOSP IP/OBS HIGH 50: CPT

## 2021-03-18 PROCEDURE — 86850 RBC ANTIBODY SCREEN: CPT

## 2021-03-18 RX ADMIN — Medication 975 MILLIGRAM(S): at 07:25

## 2021-03-18 RX ADMIN — Medication 5 MILLIGRAM(S): at 06:32

## 2021-03-18 RX ADMIN — Medication 325 MILLIGRAM(S): at 06:30

## 2021-03-18 RX ADMIN — LOSARTAN POTASSIUM 100 MILLIGRAM(S): 100 TABLET, FILM COATED ORAL at 06:30

## 2021-03-18 RX ADMIN — Medication 975 MILLIGRAM(S): at 14:33

## 2021-03-18 RX ADMIN — Medication 4: at 12:26

## 2021-03-18 RX ADMIN — OXYCODONE HYDROCHLORIDE 5 MILLIGRAM(S): 5 TABLET ORAL at 15:30

## 2021-03-18 RX ADMIN — Medication 25 MILLIGRAM(S): at 14:05

## 2021-03-18 RX ADMIN — Medication 25 MILLIGRAM(S): at 06:30

## 2021-03-18 RX ADMIN — OXYCODONE HYDROCHLORIDE 5 MILLIGRAM(S): 5 TABLET ORAL at 15:01

## 2021-03-18 RX ADMIN — Medication 975 MILLIGRAM(S): at 14:05

## 2021-03-18 RX ADMIN — PANTOPRAZOLE SODIUM 40 MILLIGRAM(S): 20 TABLET, DELAYED RELEASE ORAL at 06:30

## 2021-03-18 RX ADMIN — Medication 975 MILLIGRAM(S): at 06:35

## 2021-03-18 RX ADMIN — DULOXETINE HYDROCHLORIDE 20 MILLIGRAM(S): 30 CAPSULE, DELAYED RELEASE ORAL at 14:05

## 2021-03-18 NOTE — PROGRESS NOTE ADULT - SUBJECTIVE AND OBJECTIVE BOX
Ortho Note    Procedure: R JOSE  Surgeon: Dr. Austin     Pt comfortable without complaints, pain controlled  Denies CP, SOB, N/V, numbness/tingling     Vital Signs Last 24 Hrs  T(C): 36.4 (03-16-21 @ 22:10), Max: 36.4 (03-16-21 @ 22:10)  T(F): 97.6 (03-16-21 @ 22:10), Max: 97.6 (03-16-21 @ 22:10)  HR: 69 (03-16-21 @ 23:39) (60 - 69)  BP: 159/65 (03-16-21 @ 23:39) (119/73 - 189/77)  BP(mean): 93 (03-16-21 @ 20:21) (93 - 111)  RR: 18 (03-16-21 @ 23:39) (17 - 20)  SpO2: 96% (03-16-21 @ 23:39) (96% - 100%)  AVSS    General: Pt Alert and oriented, NAD  DSG C/D/I  Pulses: palpable DP  Sensation: SILT  Motor: 5/5 EHL/FHL/TA/GS        Post-op X-Ray:  hardware in place     A/P: 76yMale POD#0 s/p above procedure  - Stable  - Pain Control  - DVT ppx: aspirin  - Post op abx: ancef  - PT, WBS: WBAT    Ortho Pager [phone]

## 2021-03-18 NOTE — PROVIDER CONTACT NOTE (OTHER) - SITUATION
With order for CPAP. Px refused it as he claimed after significant weight loss, such was resolved.
Pt /81

## 2021-03-18 NOTE — PROGRESS NOTE ADULT - SUBJECTIVE AND OBJECTIVE BOX
Patient is a 76y old  Male who presents with a chief complaint of right hip pain (17 Mar 2021 09:17)      INTERVAL HPI/OVERNIGHT EVENTS:  pod#2    Subjective:    Patient feels well today,   has no concerns or complaints.      Review of Systems: 12 point review of systems otherwise negative    MEDICATIONS  (STANDING):  acetaminophen   Tablet .. 975 milliGRAM(s) Oral every 8 hours  aspirin enteric coated 325 milliGRAM(s) Oral two times a day  atorvastatin 80 milliGRAM(s) Oral at bedtime  dextrose 40% Gel 15 Gram(s) Oral once  dextrose 5%. 1000 milliLiter(s) (50 mL/Hr) IV Continuous <Continuous>  dextrose 5%. 1000 milliLiter(s) (100 mL/Hr) IV Continuous <Continuous>  dextrose 50% Injectable 25 Gram(s) IV Push once  dextrose 50% Injectable 12.5 Gram(s) IV Push once  dextrose 50% Injectable 25 Gram(s) IV Push once  DULoxetine 20 milliGRAM(s) Oral daily  glucagon  Injectable 1 milliGRAM(s) IntraMuscular once  insulin lispro (ADMELOG) corrective regimen sliding scale   SubCutaneous three times a day before meals  insulin lispro (ADMELOG) corrective regimen sliding scale   SubCutaneous at bedtime  lactated ringers. 1000 milliLiter(s) (150 mL/Hr) IV Continuous <Continuous>  LORazepam     Tablet 1 milliGRAM(s) Oral at bedtime  losartan 100 milliGRAM(s) Oral daily  pantoprazole    Tablet 40 milliGRAM(s) Oral before breakfast  polyethylene glycol 3350 17 Gram(s) Oral at bedtime  predniSONE   Tablet 5 milliGRAM(s) Oral daily  pregabalin 25 milliGRAM(s) Oral three times a day  senna 2 Tablet(s) Oral at bedtime  tamsulosin 0.4 milliGRAM(s) Oral at bedtime    MEDICATIONS  (PRN):  aluminum hydroxide/magnesium hydroxide/simethicone Suspension 30 milliLiter(s) Oral four times a day PRN Indigestion  bisacodyl Suppository 10 milliGRAM(s) Rectal once PRN Constipation  HYDROmorphone  Injectable 0.5 milliGRAM(s) IV Push every 15 minutes PRN Severe Pain (7 - 10)  HYDROmorphone  Injectable 0.5 milliGRAM(s) IV Push every 4 hours PRN Severe Pain (7 - 10)  magnesium hydroxide Suspension 30 milliLiter(s) Oral daily PRN Constipation  ondansetron Injectable 4 milliGRAM(s) IV Push every 6 hours PRN Nausea and/or Vomiting  oxyCODONE    IR 5 milliGRAM(s) Oral every 3 hours PRN Severe Pain (7 - 10)  traMADol 50 milliGRAM(s) Oral every 6 hours PRN Moderate Pain (4 - 6)  zolpidem 5 milliGRAM(s) Oral at bedtime PRN Insomnia        Allergies    No Known Allergies    Intolerances      Vital Signs Last 24 Hrs  T(C): 36.1 (18 Mar 2021 09:28), Max: 36.6 (17 Mar 2021 17:10)  T(F): 97 (18 Mar 2021 09:28), Max: 97.8 (17 Mar 2021 17:10)  HR: 78 (18 Mar 2021 09:28) (72 - 81)  BP: 138/68 (18 Mar 2021 09:28) (131/58 - 143/66)  BP(mean): --  RR: 16 (18 Mar 2021 09:28) (15 - 18)  SpO2: 97% (18 Mar 2021 09:28) (94% - 97%)    I&O's Summary    17 Mar 2021 07:01  -  18 Mar 2021 07:00  --------------------------------------------------------  IN: 1044 mL / OUT: 1850 mL / NET: -806 mL    18 Mar 2021 07:01  -  18 Mar 2021 13:49  --------------------------------------------------------  IN: 0 mL / OUT: 500 mL / NET: -500 mL          Physical Exam:    Daily     Daily   General:  Well appearing, NAD, not cachetic  HEENT:  Nonicteric, PERRLA  CV:  RRR, no murmur, no JVD  Lungs:  CTA B/L, no wheezes, rales, rhonchi  Abdomen:  Soft, non-tender, no distended, positive BS, no hepatosplenomegaly  Extremities:  2+ pulses, no c/c, no edema; right hip dsg c/d/i  Skin:  Warm and dry, no rashes  :  No caballero  Neuro:  AAOx3, non-focal, CN II-XII grossly intact  No Restraints    LABS:                                   10.6   11.81 )-----------( 225      ( 17 Mar 2021 06:44 )             33.4     03-17    136  |  103  |  18  ----------------------------<  129<H>  4.2   |  25  |  0.98    Ca    8.5      17 Mar 2021 06:44

## 2021-03-18 NOTE — PROGRESS NOTE ADULT - ASSESSMENT
76 y.o m pmh as below now pod#2 s/p R JOSE.    #pod2  pain control  bowel regimen  pt  IS    #leukocytosis  -likely reactive  -no signs/sx infection  -continue to monitor    #normocytic anemia  -likely 2/2 intraoperative losses  -continue to monitor h/h    #hx CVA  -continue statin  -asa/plavix to be started 2 weeks post surgery per primary team    #DM  -a1c 6.9  -on metformin  -continue SSI inpatient; resume metformin upon d/c    #HTN  #HARISH  #Psoriatic arthritis  #Anxiety   -continue home meds
76 y.o m pmh as below now pod#1 s/p R JSOE.    #pod#1  pain control  bowel regimen  pt  IS    #leukocytosis  -likely reactive  -no signs/sx infection  -continue to monitor    #normocytic anemia  -likely 2/2 intraoperative losses  -continue to monitor h/h    #hx CVA  -continue statin  -asa/plavix to be started 2 weeks post surgery per primary team    #DM  -a1c 6.9  -on metformin  -continue SSI inpatient; resume metformin upon d/c    #HTN  #HARISH  #Psoriatic arthritis  #Anxiety   -continue home meds

## 2021-03-25 DIAGNOSIS — M25.551 PAIN IN RIGHT HIP: ICD-10-CM

## 2021-03-25 DIAGNOSIS — Z86.73 PERSONAL HISTORY OF TRANSIENT ISCHEMIC ATTACK (TIA), AND CEREBRAL INFARCTION WITHOUT RESIDUAL DEFICITS: ICD-10-CM

## 2021-03-25 DIAGNOSIS — Z79.01 LONG TERM (CURRENT) USE OF ANTICOAGULANTS: ICD-10-CM

## 2021-03-25 DIAGNOSIS — M48.00 SPINAL STENOSIS, SITE UNSPECIFIED: ICD-10-CM

## 2021-03-25 DIAGNOSIS — Z79.84 LONG TERM (CURRENT) USE OF ORAL HYPOGLYCEMIC DRUGS: ICD-10-CM

## 2021-03-25 DIAGNOSIS — M16.11 UNILATERAL PRIMARY OSTEOARTHRITIS, RIGHT HIP: ICD-10-CM

## 2021-03-25 DIAGNOSIS — I10 ESSENTIAL (PRIMARY) HYPERTENSION: ICD-10-CM

## 2021-03-25 DIAGNOSIS — F41.9 ANXIETY DISORDER, UNSPECIFIED: ICD-10-CM

## 2021-03-25 DIAGNOSIS — E11.9 TYPE 2 DIABETES MELLITUS WITHOUT COMPLICATIONS: ICD-10-CM

## 2021-03-25 DIAGNOSIS — I25.10 ATHEROSCLEROTIC HEART DISEASE OF NATIVE CORONARY ARTERY WITHOUT ANGINA PECTORIS: ICD-10-CM

## 2021-03-25 DIAGNOSIS — Z79.52 LONG TERM (CURRENT) USE OF SYSTEMIC STEROIDS: ICD-10-CM

## 2021-03-25 DIAGNOSIS — L40.50 ARTHROPATHIC PSORIASIS, UNSPECIFIED: ICD-10-CM

## 2021-03-25 DIAGNOSIS — G47.33 OBSTRUCTIVE SLEEP APNEA (ADULT) (PEDIATRIC): ICD-10-CM

## 2021-03-26 ENCOUNTER — APPOINTMENT (OUTPATIENT)
Dept: MRI IMAGING | Facility: HOSPITAL | Age: 76
End: 2021-03-26

## 2021-10-22 NOTE — ED PROVIDER NOTE - NS ED MD EM SELECTION
DIABETES CARE  Consulted by Provider for Diabetes Education: new diabetes diagnosis, hx of prediabetes    34 year old male with new diabetes diagnosis. Patient was admitted for DKA  Related Co-morbidities include:   Past Medical History:   Diagnosis Date     Diabetes (H)      Hypertension      Morbid obesity (H)      Sleep apnea      PCP: Lili Mix  Social: living in hotel until home is ready    Nutrition & Diabetes History:   Pt reports he was diagnosed with prediabetes a few years ago, reports no ed but was started on Metformin.     Not following any specific diet pta. Eats banana in morning and tv dinners or take-out (d/t living in hotel)2. Reports his wife counts carbohydrates.     His sister has type 1 diabetes, he has checked his BG before on her meter.    Meds for BG Management PTA:  -Metformin 500mg with breakfast    Current Inpatient Meds for BG Management:  -Insulin drip    Labs:  Hemoglobin A1C:13.9% (10/22/21)           Hgb:16    SCr: 2.23  GFR: 38  BGs:   Results for NAKUL EASTMAN JR. (MRN [id number]) as of 10/22/2021 15:59   Ref. Range 10/22/2021 13:37 10/22/2021 13:49 10/22/2021 14:19 10/22/2021 14:47 10/22/2021 15:38   GLUCOSE BY METER POCT Latest Ref Range: 70 - 99 mg/dL 265 (H)   257 (H) 216 (H)       Diet Order: NPO     Weight: 163.3kg  BMI: Body mass index is 45 kg/m .      DM EDUCATION/COUNSELING:  Barriers to Learning and/or DM Self-Management: new diagnosis    Current Education and/or visit with Patient and/or caregiver(s):  Educated pt on new diabetes diagnosis including pathophysiology and difference between type 1 and type 2 diabetes. Given his age, admit with DKA and sister with type 1 it is possible he also had this but will need antibody tests done. For now whether type 1 or 2 his diabetes will be treated the same given A1C and BG, will need basal/bolus.     Discussed current blood sugars/A1C and target/goal numbers.  Reviewed hypoglycemia/hyperglycemia symptoms and risks  "of complications with continued high blood glucose. Educated on how to treat low Bg and that this is a side effect with insulin. Reviewed when to call/who to call.     Discussed diet as important components to good BG control. Educated that he will either need to eat consistent cho at each meal with set dose vs if he can learn to cho count can have a more liberalized eating plan and take insulin based on the amt of cho eaten. Pt would like to do an I/C ratio, notes his wife is good at cho counting and can help him.       Instructed on insulin pen use. Medications (basal/mealtime) were explained, including how they each acted on blood sugar, their timing and differences in dosing. Left home needles in room so pt can practice giving himself insulin, nursing aware.  Also discussed site rotation, proper storage and safe needle disposal.     Provided pt with OneTouch meter. Instructed on how to use this and pt able to demonstrate use. Reviewed importance of checking blood glucose levels and keeping log and/or bringing meter to all f/u visits. Also encouraged pt to see diabetes ed OP, he is ok with me placing referral to PCP for this.     (See also \"Diabetic Ed Flowsheet\"  Or Education tab-diabetes for any education topic details.)    ASSESSMENT:  Pt with new diabetes diagnosis, A1C 13.9%. Pt does have family hx of type 1 so should have antibody testing done. Whether type 1 vs 2 he will need insulin given A1C/BG DKA admit. Needs ongoing education while here by nursing (diabetes ed not here on weekends). Close f/u OP.    RECOMMENDATIONS:  -Once able to transition to subcutaneous recommend basal + mealtime at discharge (correction while here). Pt would recommend I/C ratio, pt would like to do this at discharge.  -RD consult for cho count education.  -Recommend ordering some/all of the following antibody tests to assess for type 1 diabetes: ARIANA, ICA, IA-2, IAA, and ZN-T8, all with various sensitivities. This can also be done " "outpatient  -Nursing to continue to work with pt for ongoing education.    For inpatient diabetes management guidelines refer to \"Guidelines for Subcutaneous Insulin Dosing\" found in the DIAB Subcutaneous Insulin Management Adult order set.     F/U PLAN:  Will send request to PCP for DM educator referral at discharge.    DISCHARGE NEEDS:   RX needed at discharge (preferred by pt's insurance):  1. Humalog KwikPens  2. Pen needles 4mm (5/32\") x 32G box of 100  3. Lantus SoloStar pens  4. One Touch Verio Strips - 2 boxes of 50  5. Delica lancets - box of 100    Thank you,   Swathi Chin RD, LD, [facility]  Diabetes Educator  Pager: [phone]            " [id number] [facility]

## 2022-05-17 NOTE — ED ADULT TRIAGE NOTE - BP NONINVASIVE SYSTOLIC (MM HG)
GENERAL SURGERY PROGRESS NOTE     CC: Nausea and vomiting    INTERVAL HISTORY: Ms. Lyman reports some drain site abdominal discomfort. She is taking Senokot and had a bowel movement today. Passing flatus. Tolerating a diet with no nausea or vomiting. Patient reports she is still tired from not sleeping well at night. Daughter-in-law is at bedside.     REVIEW OF SYSTEMS    CONSTITUTIONAL: Denies - fever and chills.   CARDIOVASCuLAR: Denies - chest pain.   RESPIRATORY: Denies - shortness of breath and cough.    Pertinent past history  CKD III  DM 2  HTN  GERD  PAD  Hysterectomy  Appendectomy    PHYSICAL EXAM:   Constitutional:   Visit Vitals  BP (!) 163/70 (BP Location: RUE - Right upper extremity, Patient Position: Sitting)   Pulse 91   Temp 98 °F (36.7 °C) (Oral)   Resp 18   Ht 5' 6\" (1.676 m)   Wt 97.3 kg (214 lb 8.1 oz)   SpO2 93%   BMI 34.62 kg/m²    -appears comforable -obese body habitus  Resp: -Normal effort -normal breath sounds bilaterally  CV: -Regular rate and rhythm -no lower extremity edema  GI: Soft, non-distended, mild tenderness to upper abdomen. Cholecystostomy drain with output of normal bile.  No palpable masses, no HSM.  Psych: -Normal mood and affect    Laboratory Results:  WBC (K/mcL)   Date Value   05/17/2022 11.1 (H)     HGB (g/dL)   Date Value   05/17/2022 9.0 (L)     PLT (K/mcL)   Date Value   05/17/2022 304     Creatinine (mg/dL)   Date Value   05/17/2022 1.02 (H)     GOT/AST (Units/L)   Date Value   05/17/2022 24     GPT/ALT (Units/L)   Date Value   05/17/2022 176 (H)     No results found for: GGTP  Alkaline Phosphatase (Units/L)   Date Value   05/17/2022 213 (H)     Bilirubin, Total (mg/dL)   Date Value   05/17/2022 0.5     IMAGING  5/13/22  EXAM: MRI MRCP WO CONTRAST  INDICATION: Right upper quadrant abdominal pain, US nondiagnostic,  Jaundice, elevated T.bili and LFTS. assess for CBD stone.  COMPARISON: Ultrasound 5/13/2022.  TECHNIQUE: Noncontrast multiplanar fast spin echo, gradient  echo, and  FIESTA sequences.  Heavily T2 weighted MRI sequences obtained as per MRCP  protocol.  FINDINGS:  Lung bases: No pleural or pericardial effusions.  Liver parenchyma: Normal.  No lesions.  Intrahepatic bile ducts: Normal caliber.  Common duct diameter: 5 mm.  Pancreatic duct: Normal diameter.  Ductal configuration: Normal.  Gallbladder: Cholelithiasis and gallbladder wall thickening  Choledocholithiasis: None.  Spleen: Normal size.  Pancreas: Normal.  No lesions.  Kidneys: Simple bilateral renal cortical cystic lesions.  Adrenal glands: Normal.  Bowel: Nonobstructed.  Ascites: None.  IMPRESSION:  1. Cholelithiasis and gallbladder wall thickening concerning for acute  cholecystitis.  2. No biliary ductal dilatation or choledocholithiasis noted.    ASSESSMENT:  Acute cholecystitis with possible passed gallstone.  MRCP negative for biliary dilatation or choledocholithiasis.  LFTs improving following alejandra drain placement.   S/p IR cholecystostomy tube placement on 5/14/2022. Bile culture with Enterococcus faecium and staph aurerus, final pending.  Improved leukocytosis. Afebrile.     PLAN:    · Consistent carb, low fat diet  · Monitor cholecystostomy drain output, Will need IR sinogram in 4-6 weeks with follow up with Dr. Hernandez to discuss cholecystectomy.   · Abx per ID recommendations  · OK to discharge from surgical standpoint when medically cleared     Discussed with: Patient, SRAVAN Baca  5/17/2022  11:12 AM         175

## 2022-12-12 NOTE — PATIENT PROFILE ADULT - NSPROMUTPARTICIPCAREFT_GEN_A_NUR
[facility] Call Center    Phone Message    May a detailed message be left on voicemail: yes     Reason for Call: Other: pt called and stated he is wondering if a VV will be beneficial for him as he feelings testing needs to be done - pt has questions regarding the VV and what it entails - please call pt to further discuss, thanks!     Action Taken: Message routed to:  Other: Uro    Travel Screening: Not Applicable                                                                       N/A

## 2023-01-24 ENCOUNTER — NON-APPOINTMENT (OUTPATIENT)
Age: 78
End: 2023-01-24

## 2023-02-09 ENCOUNTER — APPOINTMENT (OUTPATIENT)
Dept: UROLOGY | Facility: CLINIC | Age: 78
End: 2023-02-09
Payer: MEDICARE

## 2023-02-09 VITALS
DIASTOLIC BLOOD PRESSURE: 84 MMHG | BODY MASS INDEX: 28.28 KG/M2 | RESPIRATION RATE: 16 BRPM | SYSTOLIC BLOOD PRESSURE: 172 MMHG | HEIGHT: 71 IN | WEIGHT: 202 LBS | HEART RATE: 68 BPM

## 2023-02-09 DIAGNOSIS — D49.4 NEOPLASM OF UNSPECIFIED BEHAVIOR OF BLADDER: ICD-10-CM

## 2023-02-09 DIAGNOSIS — N32.89 OTHER SPECIFIED DISORDERS OF BLADDER: ICD-10-CM

## 2023-02-09 PROCEDURE — 99203 OFFICE O/P NEW LOW 30 MIN: CPT

## 2023-02-11 PROBLEM — D49.4 BLADDER NEOPLASM: Status: ACTIVE | Noted: 2023-02-11

## 2023-02-11 NOTE — HISTORY OF PRESENT ILLNESS
[FreeTextEntry1] : Demetrio Gasca presents to the office today.  He is a 78-year-old man here today for evaluation of lower urinary tract symptoms and erectile dysfunction.  The patient previously had much more significant lower urinary tract symptoms.  After he had gotten much better control of his diabetes, those symptoms have significantly improved.  He is not currently having any bother with symptoms such as urgency or frequency.  There is no hematuria or dysuria.  He has not noted any foul odor to the urine.\par \par He gives a history of having been diagnosed with a "bladder mass" in the past.  He underwent a procedure either for resection or biopsy and was told that those results showed benign tissue.  No reports or imaging are available to better characterize what this history is regarding. \par

## 2023-02-11 NOTE — ASSESSMENT
[FreeTextEntry1] : The history with this bladder lesion is unclear at this time.  I would like to follow-up with some imaging and we will start with a renal and bladder ultrasound to assess whether or not there is any evidence of a mass lesion present.  We may also consider pursuing a CT urogram if there are any abnormalities detected on that study.  For now he seems to be doing well from a lower urinary tract standpoint and does not need any new interventions.  Once I have results of the ultrasound I will be in touch with him.

## 2023-02-21 ENCOUNTER — OUTPATIENT (OUTPATIENT)
Dept: OUTPATIENT SERVICES | Facility: HOSPITAL | Age: 78
LOS: 1 days | End: 2023-02-21
Payer: COMMERCIAL

## 2023-02-21 ENCOUNTER — APPOINTMENT (OUTPATIENT)
Dept: ULTRASOUND IMAGING | Facility: CLINIC | Age: 78
End: 2023-02-21
Payer: MEDICARE

## 2023-02-21 DIAGNOSIS — Z98.89 OTHER SPECIFIED POSTPROCEDURAL STATES: Chronic | ICD-10-CM

## 2023-02-21 DIAGNOSIS — N32.89 OTHER SPECIFIED DISORDERS OF BLADDER: ICD-10-CM

## 2023-02-21 DIAGNOSIS — Z95.1 PRESENCE OF AORTOCORONARY BYPASS GRAFT: Chronic | ICD-10-CM

## 2023-02-21 DIAGNOSIS — Z86.79 PERSONAL HISTORY OF OTHER DISEASES OF THE CIRCULATORY SYSTEM: Chronic | ICD-10-CM

## 2023-02-21 DIAGNOSIS — M71.21 SYNOVIAL CYST OF POPLITEAL SPACE [BAKER], RIGHT KNEE: Chronic | ICD-10-CM

## 2023-02-21 PROCEDURE — 76770 US EXAM ABDO BACK WALL COMP: CPT

## 2023-02-21 PROCEDURE — 76770 US EXAM ABDO BACK WALL COMP: CPT | Mod: 26

## 2023-08-10 ENCOUNTER — INPATIENT (INPATIENT)
Facility: HOSPITAL | Age: 78
LOS: 1 days | Discharge: HOME | DRG: 65 | End: 2023-08-12
Attending: INTERNAL MEDICINE | Admitting: PSYCHIATRY & NEUROLOGY
Payer: MEDICARE

## 2023-08-10 ENCOUNTER — APPOINTMENT (OUTPATIENT)
Dept: HEART AND VASCULAR | Facility: CLINIC | Age: 78
End: 2023-08-10

## 2023-08-10 VITALS
SYSTOLIC BLOOD PRESSURE: 112 MMHG | HEART RATE: 84 BPM | OXYGEN SATURATION: 98 % | DIASTOLIC BLOOD PRESSURE: 75 MMHG | TEMPERATURE: 98 F | RESPIRATION RATE: 18 BRPM | HEIGHT: 70 IN | WEIGHT: 199.96 LBS

## 2023-08-10 DIAGNOSIS — Z86.79 PERSONAL HISTORY OF OTHER DISEASES OF THE CIRCULATORY SYSTEM: Chronic | ICD-10-CM

## 2023-08-10 DIAGNOSIS — Z98.89 OTHER SPECIFIED POSTPROCEDURAL STATES: Chronic | ICD-10-CM

## 2023-08-10 DIAGNOSIS — M71.21 SYNOVIAL CYST OF POPLITEAL SPACE [BAKER], RIGHT KNEE: Chronic | ICD-10-CM

## 2023-08-10 DIAGNOSIS — Z95.1 PRESENCE OF AORTOCORONARY BYPASS GRAFT: Chronic | ICD-10-CM

## 2023-08-10 LAB
ANION GAP SERPL CALC-SCNC: 9 MMOL/L — SIGNIFICANT CHANGE UP (ref 5–17)
APPEARANCE UR: CLEAR — SIGNIFICANT CHANGE UP
APTT BLD: 32.5 SEC — SIGNIFICANT CHANGE UP (ref 24.5–35.6)
BACTERIA # UR AUTO: PRESENT /HPF
BASOPHILS # BLD AUTO: 0.04 K/UL — SIGNIFICANT CHANGE UP (ref 0–0.2)
BASOPHILS NFR BLD AUTO: 0.3 % — SIGNIFICANT CHANGE UP (ref 0–2)
BILIRUB UR-MCNC: NEGATIVE — SIGNIFICANT CHANGE UP
BUN SERPL-MCNC: 31 MG/DL — HIGH (ref 7–23)
CALCIUM SERPL-MCNC: 9.5 MG/DL — SIGNIFICANT CHANGE UP (ref 8.4–10.5)
CHLORIDE SERPL-SCNC: 101 MMOL/L — SIGNIFICANT CHANGE UP (ref 96–108)
CO2 SERPL-SCNC: 26 MMOL/L — SIGNIFICANT CHANGE UP (ref 22–31)
COLOR SPEC: YELLOW — SIGNIFICANT CHANGE UP
CREAT SERPL-MCNC: 1.48 MG/DL — HIGH (ref 0.5–1.3)
DIFF PNL FLD: NEGATIVE — SIGNIFICANT CHANGE UP
EGFR: 49 ML/MIN/1.73M2 — LOW
EOSINOPHIL # BLD AUTO: 0.28 K/UL — SIGNIFICANT CHANGE UP (ref 0–0.5)
EOSINOPHIL NFR BLD AUTO: 2.3 % — SIGNIFICANT CHANGE UP (ref 0–6)
EPI CELLS # UR: ABNORMAL /HPF (ref 0–5)
GLUCOSE BLDC GLUCOMTR-MCNC: 100 MG/DL — HIGH (ref 70–99)
GLUCOSE BLDC GLUCOMTR-MCNC: 154 MG/DL — HIGH (ref 70–99)
GLUCOSE SERPL-MCNC: 155 MG/DL — HIGH (ref 70–99)
GLUCOSE UR QL: NEGATIVE — SIGNIFICANT CHANGE UP
HCT VFR BLD CALC: 40.7 % — SIGNIFICANT CHANGE UP (ref 39–50)
HGB BLD-MCNC: 13.2 G/DL — SIGNIFICANT CHANGE UP (ref 13–17)
IMM GRANULOCYTES NFR BLD AUTO: 0.7 % — SIGNIFICANT CHANGE UP (ref 0–0.9)
INR BLD: 0.91 — SIGNIFICANT CHANGE UP (ref 0.85–1.18)
KETONES UR-MCNC: NEGATIVE — SIGNIFICANT CHANGE UP
LEUKOCYTE ESTERASE UR-ACNC: ABNORMAL
LYMPHOCYTES # BLD AUTO: 16.9 % — SIGNIFICANT CHANGE UP (ref 13–44)
LYMPHOCYTES # BLD AUTO: 2.08 K/UL — SIGNIFICANT CHANGE UP (ref 1–3.3)
MCHC RBC-ENTMCNC: 31 PG — SIGNIFICANT CHANGE UP (ref 27–34)
MCHC RBC-ENTMCNC: 32.4 GM/DL — SIGNIFICANT CHANGE UP (ref 32–36)
MCV RBC AUTO: 95.5 FL — SIGNIFICANT CHANGE UP (ref 80–100)
MONOCYTES # BLD AUTO: 1.02 K/UL — HIGH (ref 0–0.9)
MONOCYTES NFR BLD AUTO: 8.3 % — SIGNIFICANT CHANGE UP (ref 2–14)
NEUTROPHILS # BLD AUTO: 8.77 K/UL — HIGH (ref 1.8–7.4)
NEUTROPHILS NFR BLD AUTO: 71.5 % — SIGNIFICANT CHANGE UP (ref 43–77)
NITRITE UR-MCNC: NEGATIVE — SIGNIFICANT CHANGE UP
NRBC # BLD: 0 /100 WBCS — SIGNIFICANT CHANGE UP (ref 0–0)
PA ADP PRP-ACNC: 127 PRU — LOW (ref 194–418)
PH UR: 5.5 — SIGNIFICANT CHANGE UP (ref 5–8)
PLATELET # BLD AUTO: 303 K/UL — SIGNIFICANT CHANGE UP (ref 150–400)
PLATELET RESPONSE ASPIRIN RESULT: 521 ARU — SIGNIFICANT CHANGE UP (ref 350–700)
POTASSIUM SERPL-MCNC: 4.5 MMOL/L — SIGNIFICANT CHANGE UP (ref 3.5–5.3)
POTASSIUM SERPL-SCNC: 4.5 MMOL/L — SIGNIFICANT CHANGE UP (ref 3.5–5.3)
PROT UR-MCNC: 30 MG/DL
PROTHROM AB SERPL-ACNC: 10.4 SEC — SIGNIFICANT CHANGE UP (ref 9.5–13)
RBC # BLD: 4.26 M/UL — SIGNIFICANT CHANGE UP (ref 4.2–5.8)
RBC # FLD: 15.7 % — HIGH (ref 10.3–14.5)
RBC CASTS # UR COMP ASSIST: < 5 /HPF — SIGNIFICANT CHANGE UP
SODIUM SERPL-SCNC: 136 MMOL/L — SIGNIFICANT CHANGE UP (ref 135–145)
SP GR SPEC: >=1.03 — SIGNIFICANT CHANGE UP (ref 1–1.03)
UROBILINOGEN FLD QL: 0.2 E.U./DL — SIGNIFICANT CHANGE UP
WBC # BLD: 12.28 K/UL — HIGH (ref 3.8–10.5)
WBC # FLD AUTO: 12.28 K/UL — HIGH (ref 3.8–10.5)
WBC UR QL: < 5 /HPF — SIGNIFICANT CHANGE UP

## 2023-08-10 PROCEDURE — G1004: CPT

## 2023-08-10 PROCEDURE — 99285 EMERGENCY DEPT VISIT HI MDM: CPT

## 2023-08-10 PROCEDURE — 70496 CT ANGIOGRAPHY HEAD: CPT | Mod: 26,ME

## 2023-08-10 PROCEDURE — 99223 1ST HOSP IP/OBS HIGH 75: CPT

## 2023-08-10 PROCEDURE — 70498 CT ANGIOGRAPHY NECK: CPT | Mod: 26,ME

## 2023-08-10 PROCEDURE — 70450 CT HEAD/BRAIN W/O DYE: CPT | Mod: 26,ME,59

## 2023-08-10 RX ORDER — ASPIRIN/CALCIUM CARB/MAGNESIUM 324 MG
1 TABLET ORAL
Refills: 0 | DISCHARGE

## 2023-08-10 RX ORDER — METFORMIN HYDROCHLORIDE 850 MG/1
1 TABLET ORAL
Refills: 0 | DISCHARGE

## 2023-08-10 RX ORDER — ATORVASTATIN CALCIUM 80 MG/1
80 TABLET, FILM COATED ORAL AT BEDTIME
Refills: 0 | Status: DISCONTINUED | OUTPATIENT
Start: 2023-08-10 | End: 2023-08-12

## 2023-08-10 RX ORDER — DEXTROSE 50 % IN WATER 50 %
15 SYRINGE (ML) INTRAVENOUS ONCE
Refills: 0 | Status: DISCONTINUED | OUTPATIENT
Start: 2023-08-10 | End: 2023-08-12

## 2023-08-10 RX ORDER — ENOXAPARIN SODIUM 100 MG/ML
40 INJECTION SUBCUTANEOUS EVERY 24 HOURS
Refills: 0 | Status: DISCONTINUED | OUTPATIENT
Start: 2023-08-10 | End: 2023-08-12

## 2023-08-10 RX ORDER — METHOTREXATE 2.5 MG/1
17.5 TABLET ORAL
Qty: 0 | Refills: 0 | DISCHARGE

## 2023-08-10 RX ORDER — DEXTROSE 50 % IN WATER 50 %
25 SYRINGE (ML) INTRAVENOUS ONCE
Refills: 0 | Status: DISCONTINUED | OUTPATIENT
Start: 2023-08-10 | End: 2023-08-12

## 2023-08-10 RX ORDER — ATORVASTATIN CALCIUM 80 MG/1
1 TABLET, FILM COATED ORAL
Refills: 0 | DISCHARGE

## 2023-08-10 RX ORDER — GLUCAGON INJECTION, SOLUTION 0.5 MG/.1ML
1 INJECTION, SOLUTION SUBCUTANEOUS ONCE
Refills: 0 | Status: DISCONTINUED | OUTPATIENT
Start: 2023-08-10 | End: 2023-08-12

## 2023-08-10 RX ORDER — INSULIN LISPRO 100/ML
VIAL (ML) SUBCUTANEOUS
Refills: 0 | Status: DISCONTINUED | OUTPATIENT
Start: 2023-08-10 | End: 2023-08-12

## 2023-08-10 RX ORDER — TAMSULOSIN HYDROCHLORIDE 0.4 MG/1
1 CAPSULE ORAL
Qty: 0 | Refills: 0 | DISCHARGE

## 2023-08-10 RX ORDER — SODIUM CHLORIDE 9 MG/ML
1000 INJECTION, SOLUTION INTRAVENOUS
Refills: 0 | Status: DISCONTINUED | OUTPATIENT
Start: 2023-08-10 | End: 2023-08-12

## 2023-08-10 RX ORDER — ASPIRIN/CALCIUM CARB/MAGNESIUM 324 MG
81 TABLET ORAL DAILY
Refills: 0 | Status: DISCONTINUED | OUTPATIENT
Start: 2023-08-11 | End: 2023-08-12

## 2023-08-10 RX ORDER — ASCORBIC ACID 60 MG
1 TABLET,CHEWABLE ORAL
Qty: 0 | Refills: 0 | DISCHARGE

## 2023-08-10 RX ORDER — SECUKINUMAB 150 MG/ML
300 INJECTION SUBCUTANEOUS
Refills: 0 | DISCHARGE

## 2023-08-10 RX ORDER — TICAGRELOR 90 MG/1
90 TABLET ORAL
Refills: 0 | Status: DISCONTINUED | OUTPATIENT
Start: 2023-08-10 | End: 2023-08-10

## 2023-08-10 RX ORDER — FOLIC ACID 0.8 MG
1 TABLET ORAL
Qty: 0 | Refills: 0 | DISCHARGE

## 2023-08-10 RX ORDER — TICAGRELOR 90 MG/1
90 TABLET ORAL
Refills: 0 | Status: DISCONTINUED | OUTPATIENT
Start: 2023-08-10 | End: 2023-08-12

## 2023-08-10 RX ORDER — DULOXETINE HYDROCHLORIDE 30 MG/1
1 CAPSULE, DELAYED RELEASE ORAL
Qty: 0 | Refills: 0 | DISCHARGE

## 2023-08-10 RX ORDER — FLUTICASONE PROPIONATE 50 MCG
1 SPRAY, SUSPENSION NASAL
Qty: 0 | Refills: 0 | DISCHARGE

## 2023-08-10 RX ORDER — LOSARTAN POTASSIUM 100 MG/1
1 TABLET, FILM COATED ORAL
Qty: 0 | Refills: 0 | DISCHARGE

## 2023-08-10 RX ORDER — DEXTROSE 50 % IN WATER 50 %
12.5 SYRINGE (ML) INTRAVENOUS ONCE
Refills: 0 | Status: DISCONTINUED | OUTPATIENT
Start: 2023-08-10 | End: 2023-08-12

## 2023-08-10 RX ORDER — AZELASTINE 137 UG/1
2 SPRAY, METERED NASAL
Qty: 0 | Refills: 0 | DISCHARGE

## 2023-08-10 RX ORDER — METHOTREXATE 2.5 MG/1
1 TABLET ORAL
Refills: 0 | DISCHARGE

## 2023-08-10 RX ORDER — INSULIN LISPRO 100/ML
VIAL (ML) SUBCUTANEOUS AT BEDTIME
Refills: 0 | Status: DISCONTINUED | OUTPATIENT
Start: 2023-08-10 | End: 2023-08-12

## 2023-08-10 RX ADMIN — ENOXAPARIN SODIUM 40 MILLIGRAM(S): 100 INJECTION SUBCUTANEOUS at 17:34

## 2023-08-10 RX ADMIN — ATORVASTATIN CALCIUM 80 MILLIGRAM(S): 80 TABLET, FILM COATED ORAL at 22:49

## 2023-08-10 RX ADMIN — TICAGRELOR 90 MILLIGRAM(S): 90 TABLET ORAL at 22:49

## 2023-08-10 NOTE — H&P ADULT - NSHPLABSRESULTS_GEN_ALL_CORE
Fingerstick Blood Glucose: CAPILLARY BLOOD GLUCOSE        LABS:                        13.2   12.28 )-----------( 303      ( 10 Aug 2023 11:37 )             40.7     08-10    136  |  101  |  31<H>  ----------------------------<  155<H>  4.5   |  26  |  1.48<H>    Ca    9.5      10 Aug 2023 11:37      PT/INR - ( 10 Aug 2023 11:37 )   PT: 10.4 sec;   INR: 0.91          PTT - ( 10 Aug 2023 11:37 )  PTT:32.5 sec      Urinalysis Basic - ( 10 Aug 2023 13:42 )    Color: Yellow / Appearance: Clear / SG: >=1.030 / pH: x  Gluc: x / Ketone: NEGATIVE  / Bili: Negative / Urobili: 0.2 E.U./dL   Blood: x / Protein: 30 mg/dL / Nitrite: NEGATIVE   Leuk Esterase: Trace / RBC: < 5 /HPF / WBC < 5 /HPF   Sq Epi: x / Non Sq Epi: x / Bacteria: Present /HPF        RADIOLOGY & ADDITIONAL STUDIES:    HCT:     CTA: Fingerstick Blood Glucose: CAPILLARY BLOOD GLUCOSE        LABS:                        13.2   12.28 )-----------( 303      ( 10 Aug 2023 11:37 )             40.7     08-10    136  |  101  |  31<H>  ----------------------------<  155<H>  4.5   |  26  |  1.48<H>    Ca    9.5      10 Aug 2023 11:37      PT/INR - ( 10 Aug 2023 11:37 )   PT: 10.4 sec;   INR: 0.91          PTT - ( 10 Aug 2023 11:37 )  PTT:32.5 sec      Urinalysis Basic - ( 10 Aug 2023 13:42 )    Color: Yellow / Appearance: Clear / SG: >=1.030 / pH: x  Gluc: x / Ketone: NEGATIVE  / Bili: Negative / Urobili: 0.2 E.U./dL   Blood: x / Protein: 30 mg/dL / Nitrite: NEGATIVE   Leuk Esterase: Trace / RBC: < 5 /HPF / WBC < 5 /HPF   Sq Epi: x / Non Sq Epi: x / Bacteria: Present /HPF        RADIOLOGY & ADDITIONAL STUDIES:  < from: CT Head No Cont (08.10.23 @ 13:04) >    IMPRESSION:    New right corona radiata lacunar infarct, possibly source of symptoms. No   acute hemorrhage.    Chronic left PICA territory cerebellar infarct.    < end of copied text >    < from: CT Angio Neck w/ IV Cont (08.10.23 @ 13:05) >    IMPRESSION:    Neck CTA:  Bilateral vertebral arteries are occluded (right V1 and left   V2) segments, but chronic and with additional adaptive narrowing since   11/2019. Both sides reconstitute via cervical collaterals and/or   retrograde flow on right.    No significant carotid stenosis.    Head CTA: No large vessel occlusion. Interval smaller caliber of the C  vertebrobasilar system favored to be adaptive narrowing from now   bilateral vertebral artery occlusion in the neck. No significant anterior   circulation steno-occlusive disease, with mild-moderate scattered   stenoses grossly unchanged.    < end of copied text >

## 2023-08-10 NOTE — H&P ADULT - NSHPPHYSICALEXAM_GEN_ALL_CORE
Neurologic:  -Mental status: Awake, alert, oriented to person, place, and time. Speech is fluent with intact naming, repetition, and comprehension, no dysarthria. Recent and remote memory intact. Follows commands. Attention/concentration intact.   -Cranial nerves:   II: Visual fields are full to confrontation.  III, IV, VI: Extraocular movements are intact without nystagmus. Pupils equally round and reactive to light  V:  Facial sensation V1-V3 equal and intact   VII: Face is symmetric with normal eye closure and smile  VIII: Hearing is bilaterally intact to finger rub  IX, X: Uvula is midline and soft palate rises symmetrically  XI: Head turning and shoulder shrug are intact.  XII: Tongue protrudes midline  Motor: Normal bulk and tone. No pronator drift. Strength bilateral upper extremity 5/5, bilateral lower extremities 5/5.  Rapid alternating movements intact and symmetric  Sensation: Intact to light touch bilaterally. No neglect or extinction on double simultaneous testing.  Coordination: No dysmetria on finger-to-nose and heel-to-shin bilaterally  Reflexes: Downgoing toes bilaterally   Gait: Narrow gait and steady      NIHSS Score: **** ASPECT Score: *** ICH Score: *** (GCS) Neurologic:  -Mental status: Awake, alert, oriented to person, place, and time. Speech is fluent with intact naming, repetition, and comprehension, no dysarthria. Recent and remote memory intact. Follows commands. Attention/concentration intact.   -Cranial nerves:   II: Visual fields are full to confrontation.  III, IV, VI: Extraocular movements are intact without nystagmus. Pupils equally round and reactive to light  V:  Facial sensation V1-V3 equal and intact   VII: Face is symmetric with normal eye closure and smile  VIII: Hearing is bilaterally intact to finger rub  IX, X: Uvula is midline and soft palate rises symmetrically  XI: Head turning and shoulder shrug are intact.  XII: Tongue protrudes midline  Motor: Normal bulk and tone. No pronator drift. Strength bilateral upper extremity 5/5, bilateral lower extremities 5/5.  Rapid alternating movements intact and symmetric  Sensation: Intact to light touch bilaterally. No neglect or extinction on double simultaneous testing.  Coordination: LUE dysmetria  Gait: deferred      NIHSS Score: 1

## 2023-08-10 NOTE — ED PROVIDER NOTE - CLINICAL SUMMARY MEDICAL DECISION MAKING FREE TEXT BOX
77 y/o M, PMHx of HTN, CVA 3yrs ago, PAD, CAD s/p CABG x3, psoriatic arthritis, now presenting to the ED c/o of L sided numbness and slurred speech x3 days. Will order labs and CTA of head and neck, both w/ IV contrast and CT head w/ no cont. 77 y/o M, PMHx of HTN, CVA 3yrs ago, PAD, CAD s/p CABG x3, psoriatic arthritis, now presenting to the ED c/o of L sided numbness and slurred speech x3 days. Will order labs and CTA of head and neck, both w/ IV contrast and CT head w/ no cont.    Stroke team consulted and after further evaluation patient is to be admitted to Dr. Deng.

## 2023-08-10 NOTE — ED ADULT NURSE NOTE - OBJECTIVE STATEMENT
Pt arrived to ED c/o numbness. Pt stated "on monday I had slurred speech and a slight facial droop and numbness/loss of sensation to the left side". Pt reports sensation is same B/L arms and legs. Pt feels left side is a little numb and weaker than right. Pt speaking in complete and full sentences, no slurred speech or facial droop noted. Hx of CVA in 2021

## 2023-08-10 NOTE — H&P ADULT - NS ATTEND OPT1 GEN_ALL_CORE
COUNSELING: I attest my time as attending is greater than 50% of the total combined time spent on qualifying patient care activities by the PA/NP and attending.

## 2023-08-10 NOTE — ED PROVIDER NOTE - NSICDXPASTSURGICALHX_GEN_ALL_CORE_FT
PAST SURGICAL HISTORY:  Baker's cyst of knee, right 1977    H/O vertebral artery stenosis R vertebral artery stenosis s/p DCA 1/25/20,    S/P appendectomy 1977    S/P CABG x 3 7/2020    S/P colonoscopy with polypectomy 2013 adenoma    S/P laparoscopic cholecystectomy feb 2014

## 2023-08-10 NOTE — ED ADULT NURSE NOTE - BEFAST BALANCE
"Chief Complaint  Hypertension, Hyperlipidemia, and Diabetes    Subjective          Tristan Castle presents to [facility]  History of Present Illness  HPI: Patient is here to follow up on the blood pressure  The patient is taking the blood pressure medications as prescribed and has had no side effects. The patient is also here to follow up on the cholesterol and is trying to follow a diet. The patient is  also here to follow up on sugar and is  due to get lab work done .  Patient follows up with cardiology for CAD status post PTCA and also for PAD, is noted to have lost weight, he states he has no appetite, he is cut back on his smoking but smokes 5 cigarettes a day, he had a CT chest which has been reviewed, he states his last colonoscopy has been a while ago  Hyperlipidemia   Pertinent negatives include no chest pain or shortness of breath.   Hypertension   Pertinent negatives include no chest pain, palpitations or shortness of breath.    Objective   Vital Signs:   /73   Pulse 67   Temp 97.1 °F (36.2 °C)   Resp 16   Ht 172.7 cm (67.99\")   Wt 70.8 kg (156 lb)   SpO2 97%   BMI 23.73 kg/m²     Physical Exam  Vitals and nursing note reviewed.   Constitutional:       General: He is not in acute distress.     Appearance: Normal appearance. He is not diaphoretic.   HENT:      Head: Normocephalic and atraumatic.      Right Ear: External ear normal.      Left Ear: External ear normal.      Nose: Nose normal.   Eyes:      Extraocular Movements: Extraocular movements intact.      Conjunctiva/sclera: Conjunctivae normal.   Neck:      Trachea: Trachea normal.   Cardiovascular:      Rate and Rhythm: Normal rate and regular rhythm.      Heart sounds: Normal heart sounds.   Pulmonary:      Effort: Pulmonary effort is normal. No respiratory distress.   Abdominal:      General: Abdomen is flat.   Musculoskeletal:         General: Deformity present.      Cervical back: Neck supple.      " Comments: Moves all limbs   Skin:     General: Skin is warm and dry.      Findings: No erythema.   Neurological:      Mental Status: He is alert and oriented to person, place, and time.      Comments: No gross motor or sensory deficits        Result Review :     Common labs    Common Labsle 9/15/21 9/15/21 9/16/21 9/16/21 9/16/21 9/16/21 9/16/21 9/16/21 11/26/21    1819 1819 0633 0633 0633 0633 1143 1144    Glucose  150 (A)   123 (A)  128 (A)     BUN  14   13  14     Creatinine  1.10   0.95  0.89  1.00   eGFR Non  Am  68   81  87     Sodium  138   139  138     Potassium  4.6   4.6  4.7     Chloride  102   105  105     Calcium  9.5   9.0  9.0     Albumin  4.10          Total Bilirubin  0.3          Alkaline Phosphatase  80          AST (SGOT)  50 (A)          ALT (SGPT)  36          WBC 10.28  11.09 (A)     10.70    Hemoglobin 14.7  13.8     13.8    Hematocrit 45.0  42.8     42.1    Platelets 315  272     271    Total Cholesterol      142      Triglycerides      86      HDL Cholesterol      47      LDL Cholesterol       79      Hemoglobin A1C    6.40 (A)        (A) Abnormal value       Comments are available for some flowsheets but are not being displayed.                     Assessment and Plan    Diagnoses and all orders for this visit:    1. Benign essential hypertension (Primary)    2. Mixed hyperlipidemia    3. Type 2 diabetes mellitus with hyperglycemia, without long-term current use of insulin (HCC)    4. Weight loss  -     CT Abdomen Pelvis With Contrast  -     Ambulatory Referral to Gastroenterology    Plan:  1.  Benign essential hypertension: Will continue current medication, low-sodium diet advised, Counseled to regularly check BP at home with goal averaging <130/80.   2.mixed hyperlipidemia: will obtain   fasting CMP and lipid panel.  Diet and exercise counseled,  Will continue current medications  3. Diabetes  Mellitus  : will obtain   fasting CMP  and hba1c 6.4, diet and exercise counseled , Will  No continue current medications  4.  Weight loss: We will obtain CAT scans and refer patient to GI  I spent 30 minutes caring for Tristan on this date of service. This time includes time spent by me in the following activities:preparing for the visit, reviewing tests, performing a medically appropriate examination and/or evaluation , counseling and educating the patient/family/caregiver, ordering medications, tests, or procedures and documenting information in the medical record  Follow Up   Return in about 6 weeks (around 1/11/2022).  Patient was given instructions and counseling regarding his condition or for health maintenance advice. Please see specific information pulled into the AVS if appropriate.

## 2023-08-10 NOTE — ED PROVIDER NOTE - OBJECTIVE STATEMENT
77 y/o M, PMHx of HTN, CVA 3yrs ago, PAD, CAD s/p CABG x3, psoriatic arthritis, now presenting to the ED c/o of L sided numbness and slurred speech x3 days. Pt reports that he had numbness to L arm. As per wife, she noted slurred speech and left side of lip started to droop. Pt went to bed early. When he woke up the next day, he couldn't life head off pillow. Pt reports HA w/ ringing sensation as well when waking. In ED, pt reports numbness on left leg is a little better, but still persistent.

## 2023-08-10 NOTE — H&P ADULT - HISTORY OF PRESENT ILLNESS
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 Wife reports she noted L facial and slurred speech x 5-6 hours two days ago as well. Reports compliance with home meds.     PAST MEDICAL & SURGICAL HISTORY:  Hypertension      Psoriatic arthritis      DM (diabetes mellitus)      Cerebrovascular accident (CVA)      HARISH (obstructive sleep apnea)      Spinal stenosis      Anxiety      S/P appendectomy  1977      Baker's cyst of knee, right  1977      S/P colonoscopy with polypectomy  2013 adenoma      S/P laparoscopic cholecystectomy  feb 2014      H/O vertebral artery stenosis  R vertebral artery stenosis s/p DCA 1/25/20,      S/P CABG x 3  7/2020          FAMILY HISTORY:  Family history of CVA  Mother @ 86, Father in 70s      T(C): 36.4 (08-10-23 @ 09:57), Max: 36.4 (08-10-23 @ 09:57)  HR: 64 (08-10-23 @ 15:03) (64 - 84)  BP: 136/69 (08-10-23 @ 15:03) (112/75 - 136/69)  RR: 20 (08-10-23 @ 15:03) (18 - 20)  SpO2: 100% (08-10-23 @ 15:03) (98% - 100%)    MEDICATION RECONCILIATION   MEDICATIONS  (STANDING):    MEDICATIONS  (PRN):    Allergies    No Known Allergies    Intolerances      Vital Signs Last 24 Hrs  T(C): 36.4 (10 Aug 2023 09:57), Max: 36.4 (10 Aug 2023 09:57)  T(F): 97.5 (10 Aug 2023 09:57), Max: 97.5 (10 Aug 2023 09:57)  HR: 64 (10 Aug 2023 15:03) (64 - 84)  BP: 136/69 (10 Aug 2023 15:03) (112/75 - 136/69)  BP(mean): --  RR: 20 (10 Aug 2023 15:03) (18 - 20)  SpO2: 100% (10 Aug 2023 15:03) (98% - 100%)    Parameters below as of 10 Aug 2023 15:03  Patient On (Oxygen Delivery Method): room air  **STROKE HPI***    HPI: 78y Male poor historian with PMHx of HTN, HLD, DM2 (on metformin), L PICA stroke (2019, on ASA/plavix), 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 Wife reports she noted L facial and slurred speech x 5-6 hours 3 days ago as well. Reports compliance with home meds.     PAST MEDICAL & SURGICAL HISTORY:  Hypertension      Psoriatic arthritis      DM (diabetes mellitus)      Cerebrovascular accident (CVA)      HARISH (obstructive sleep apnea)      Spinal stenosis      Anxiety      S/P appendectomy  1977      Baker's cyst of knee, right  1977      S/P colonoscopy with polypectomy  2013 adenoma      S/P laparoscopic cholecystectomy  feb 2014      H/O vertebral artery stenosis  R vertebral artery stenosis s/p DCA 1/25/20,      S/P CABG x 3  7/2020          FAMILY HISTORY:  Family history of CVA  Mother @ 86, Father in 70s      T(C): 36.4 (08-10-23 @ 09:57), Max: 36.4 (08-10-23 @ 09:57)  HR: 64 (08-10-23 @ 15:03) (64 - 84)  BP: 136/69 (08-10-23 @ 15:03) (112/75 - 136/69)  RR: 20 (08-10-23 @ 15:03) (18 - 20)  SpO2: 100% (08-10-23 @ 15:03) (98% - 100%)    MEDICATION RECONCILIATION   MEDICATIONS  (STANDING):    MEDICATIONS  (PRN):    Allergies    No Known Allergies    Intolerances      Vital Signs Last 24 Hrs  T(C): 36.4 (10 Aug 2023 09:57), Max: 36.4 (10 Aug 2023 09:57)  T(F): 97.5 (10 Aug 2023 09:57), Max: 97.5 (10 Aug 2023 09:57)  HR: 64 (10 Aug 2023 15:03) (64 - 84)  BP: 136/69 (10 Aug 2023 15:03) (112/75 - 136/69)  BP(mean): --  RR: 20 (10 Aug 2023 15:03) (18 - 20)  SpO2: 100% (10 Aug 2023 15:03) (98% - 100%)    Parameters below as of 10 Aug 2023 15:03  Patient On (Oxygen Delivery Method): room air

## 2023-08-10 NOTE — ED ADULT NURSE NOTE - NSFALLRISKINTERV_ED_ALL_ED

## 2023-08-10 NOTE — PATIENT PROFILE ADULT - NSPROGENBLOODRESTRICT_GEN_A_NUR
none
CONSTITUTIONAL: Awake, alert.  Nontoxic, no acute distress.    HEAD: Normocephalic, atraumatic.    EYES: Conjunctivae clear without exudates or hemorrhage. Sclera is non-icteric.    ENT: Normal appearing external ears, nose, mucous membranes moist.    NECK: supple, trachea midline.    HEART:  Normal rate, regular rhythm.  Heart sounds S1, S2.  No murmurs, rubs or gallops.    LUNGS:  No acute respiratory distress.  Non-tachypneic and non-labored.  Lungs are clear bilaterally with good aeration.  No wheezing, rales, rhonchi.    ABDOMEN: Normal appearing skin without lesions, rashes.  Normal bowel sounds x 4.  Soft, non-distended, non-tender in all four quadrants. No rebound or guarding. No hernias or masses palpable.  No pulsatile abdominal mass.   No CVA tenderness b/l.    RECTAL: Normal appearing rectum. No stool palpated in vault.    MUSCULOSKELETAL:  Moving all extremities without issue.    SKIN: Skin in warm, dry and intact without rashes or lesions.  Appropriate color for ethnicity.    NEUROLOGICAL:  Intermittently seems slightly confused, but alert, oriented x person, place and time.

## 2023-08-10 NOTE — ED PROVIDER NOTE - CARE PLAN
Continue Regimen: Ketoconazole and Urea cream as directed Detail Level: Zone Continue Regimen: Vit C BID and Fluocinonide prn as directed Detail Level: Simple 1 Principal Discharge DX:	CVA (cerebrovascular accident)

## 2023-08-10 NOTE — H&P ADULT - NS ATTEND AMEND GEN_ALL_CORE FT
The patient is a 78 year old male with PMH of HTN, HLD, T2DM, L cerebellar stroke in 2019 (h/o vertebrobasilar disease, on DAPT), CAD s/p CABG, and bladder cancer s/p resection admitted with L hemiparesis X1-2 weeks. HCT showed ?subacute R CR ischemic stroke. CTA with proximal bilateral VA occlusions/severe stenosis, hypoplastic vertebrobasilar systemic, and nonstenotic ICAD elsewhere. Will transition to ASA+Brillinta (will check accumetrics, P2Y12 prior); continue statin. MRI, TTE, +/-EULALIO pending f/u with PCP in AM re: EKG findings reported by patient/wife (?p wave problem). May need ILR.

## 2023-08-10 NOTE — ED ADULT TRIAGE NOTE - CHIEF COMPLAINT QUOTE
Pt walked in aaox3 c/o loss of sensation on the left side of the body several days ago. Pt endorses continued loss of sensation on left lower leg and face. pt has pmhx of CVA in 2021.

## 2023-08-10 NOTE — H&P ADULT - ASSESSMENT
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 NIHSS 1. CTH with new right corona radiata lacunar infarct, possibly source of symptoms. CTA with bilateral vertebral arteries are occluded (right V1 and left V2) segments, but chronic and with additional adaptive narrowing since 11/2019. Both sides reconstitute via cervical collaterals and/or retrograde flow on right. Interval smaller caliber of the vertebrobasilar system favored to be adaptive narrowing from now bilateral vertebral artery occlusion in the neck. Not a candidate for acute intervention. Admitted for stroke w/u.    Neuro  #CVA workup  - continue aspirin 81mg and start brilinta 90mg BID daily  - continue atorvastatin 80mg daily  - q4hr stroke neuro checks and vitals  - obtain MRI Brain without contrast  - Stroke Code HCT Results: new right corona radiata lacunar infarct, possibly source of symptoms  - Stroke Code CTA Results: bilateral vertebral arteries are occluded (right V1 and left V2) segments, but chronic and with additional adaptive narrowing since 11/2019. Both sides reconstitute via cervical collaterals and/or retrograde flow on right. Interval smaller caliber of the vertebrobasilar system favored to be adaptive narrowing from now bilateral vertebral artery occlusion in the neck  - Stroke education    Cards  #HTN  - permissive hypertension, Goal -180  - hold home blood pressure medication for now  - obtain TTE with bubble    #HLD  - high dose statin as above in CVA  - LDL results: pending    Pulm  - call provider if SPO2 < 94%    GI  #Nutrition/Fluids/Electrolytes   - replete K<4 and Mg <2  - Diet: CC diet  - IVF: n/a    Renal  - trend BMP    Infectious Disease  #WBC 12.28  - trend CBC  - afebrile, denies cough, denies urinary sx    Endocrine  #DM  - A1C results: pending  - ISS    - TSH results: pending    DVT Prophylaxis  - lovenox sq for DVT prophylaxis   - SCDs for DVT prophylaxis       Dispo: stroke tele     Discussed daily hospital plans and goals with patient and wife at bedside.      Discussed with Dr. 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 NIHSS 1. CTH with new right corona radiata lacunar infarct, possibly source of symptoms. CTA with bilateral vertebral arteries are occluded (right V1 and left V2) segments, but chronic and with additional adaptive narrowing since 11/2019. Both sides reconstitute via cervical collaterals and/or retrograde flow on right. Interval smaller caliber of the vertebrobasilar system favored to be adaptive narrowing from now bilateral vertebral artery occlusion in the neck. Not a candidate for acute intervention. Admitted for stroke w/u.    Neuro  #CVA workup  - continue aspirin 81mg and start brilinta 90mg BID daily  - f/u accumetrics  - continue atorvastatin 80mg daily  - q4hr stroke neuro checks and vitals  - obtain MRI Brain without contrast  - Stroke Code HCT Results: new right corona radiata lacunar infarct, possibly source of symptoms  - Stroke Code CTA Results: bilateral vertebral arteries are occluded (right V1 and left V2) segments, but chronic and with additional adaptive narrowing since 11/2019. Both sides reconstitute via cervical collaterals and/or retrograde flow on right. Interval smaller caliber of the vertebrobasilar system favored to be adaptive narrowing from now bilateral vertebral artery occlusion in the neck  - Stroke education    Cards  #HTN  - permissive hypertension, Goal -180  - hold home blood pressure medication for now  - obtain TTE with bubble    #HLD  - high dose statin as above in CVA  - LDL results: pending    Pulm  - call provider if SPO2 < 94%    GI  #Nutrition/Fluids/Electrolytes   - replete K<4 and Mg <2  - Diet: CC diet  - IVF: n/a    Renal  - trend BMP    Infectious Disease  #WBC 12.28  - trend CBC  - afebrile, denies cough, denies urinary sx    Endocrine  #DM  - A1C results: pending  - ISS    - TSH results: pending    DVT Prophylaxis  - lovenox sq for DVT prophylaxis   - SCDs for DVT prophylaxis       Dispo: stroke tele     Discussed daily hospital plans and goals with patient and wife at bedside.      Discussed with Dr. 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 NIHSS 1. CTH with new right corona radiata lacunar infarct, possibly source of symptoms. CTA with bilateral vertebral arteries are occluded (right V1 and left V2) segments, but chronic and with additional adaptive narrowing since 11/2019. Both sides reconstitute via cervical collaterals and/or retrograde flow on right. Interval smaller caliber of the vertebrobasilar system favored to be adaptive narrowing from now bilateral vertebral artery occlusion in the neck. Not a candidate for acute intervention. Admitted for stroke w/u.    Neuro  #CVA workup  - continue aspirin 81mg and start brilinta 90mg BID daily  - f/u accumetrics  - continue atorvastatin 80mg daily  - q4hr stroke neuro checks and vitals  - obtain MRI Brain without contrast  - Stroke Code HCT Results: new right corona radiata lacunar infarct, possibly source of symptoms  - Stroke Code CTA Results: bilateral vertebral arteries are occluded (right V1 and left V2) segments, but chronic and with additional adaptive narrowing since 11/2019. Both sides reconstitute via cervical collaterals and/or retrograde flow on right. Interval smaller caliber of the vertebrobasilar system favored to be adaptive narrowing from now bilateral vertebral artery occlusion in the neck  - Stroke education    Cards  #HTN  - permissive hypertension, Goal -180  - hold home blood pressure medication for now  - obtain TTE with bubble and EULALIO    #HLD  - high dose statin as above in CVA  - LDL results: pending    Pulm  - call provider if SPO2 < 94%    GI  #Nutrition/Fluids/Electrolytes   - replete K<4 and Mg <2  - Diet: CC diet  - IVF: n/a    Renal  - trend BMP    Infectious Disease  #WBC 12.28  - trend CBC  - afebrile, denies cough, denies urinary sx    Endocrine  #DM  - A1C results: pending  - ISS    - TSH results: pending    DVT Prophylaxis  - lovenox sq for DVT prophylaxis   - SCDs for DVT prophylaxis       Dispo: stroke tele     Discussed daily hospital plans and goals with patient and wife at bedside.      Discussed with Dr. Van

## 2023-08-10 NOTE — H&P ADULT - TIME BILLING
review of objective data, interview of patient, and discussion of assessment plan with patient/family/multidisciplinary team. I agree with ACP, Fellow documentation except where indicated above.

## 2023-08-11 ENCOUNTER — TRANSCRIPTION ENCOUNTER (OUTPATIENT)
Age: 78
End: 2023-08-11

## 2023-08-11 LAB
A1C WITH ESTIMATED AVERAGE GLUCOSE RESULT: 6.1 % — HIGH (ref 4–5.6)
ANION GAP SERPL CALC-SCNC: 9 MMOL/L — SIGNIFICANT CHANGE UP (ref 5–17)
APPEARANCE UR: CLEAR — SIGNIFICANT CHANGE UP
BACTERIA # UR AUTO: PRESENT /HPF
BILIRUB UR-MCNC: NEGATIVE — SIGNIFICANT CHANGE UP
BUN SERPL-MCNC: 31 MG/DL — HIGH (ref 7–23)
CALCIUM SERPL-MCNC: 9.8 MG/DL — SIGNIFICANT CHANGE UP (ref 8.4–10.5)
CHLORIDE SERPL-SCNC: 103 MMOL/L — SIGNIFICANT CHANGE UP (ref 96–108)
CHOLEST SERPL-MCNC: 122 MG/DL — SIGNIFICANT CHANGE UP
CO2 SERPL-SCNC: 23 MMOL/L — SIGNIFICANT CHANGE UP (ref 22–31)
COLOR SPEC: YELLOW — SIGNIFICANT CHANGE UP
CREAT ?TM UR-MCNC: 89 MG/DL — SIGNIFICANT CHANGE UP
CREAT SERPL-MCNC: 1.57 MG/DL — HIGH (ref 0.5–1.3)
DIFF PNL FLD: NEGATIVE — SIGNIFICANT CHANGE UP
EGFR: 45 ML/MIN/1.73M2 — LOW
EPI CELLS # UR: SIGNIFICANT CHANGE UP /HPF (ref 0–5)
ESTIMATED AVERAGE GLUCOSE: 128 MG/DL — HIGH (ref 68–114)
GLUCOSE BLDC GLUCOMTR-MCNC: 129 MG/DL — HIGH (ref 70–99)
GLUCOSE BLDC GLUCOMTR-MCNC: 131 MG/DL — HIGH (ref 70–99)
GLUCOSE BLDC GLUCOMTR-MCNC: 144 MG/DL — HIGH (ref 70–99)
GLUCOSE BLDC GLUCOMTR-MCNC: 181 MG/DL — HIGH (ref 70–99)
GLUCOSE SERPL-MCNC: 150 MG/DL — HIGH (ref 70–99)
GLUCOSE UR QL: NEGATIVE — SIGNIFICANT CHANGE UP
HCT VFR BLD CALC: 38.1 % — LOW (ref 39–50)
HDLC SERPL-MCNC: 35 MG/DL — LOW
HGB BLD-MCNC: 12.6 G/DL — LOW (ref 13–17)
KETONES UR-MCNC: NEGATIVE — SIGNIFICANT CHANGE UP
LEUKOCYTE ESTERASE UR-ACNC: NEGATIVE — SIGNIFICANT CHANGE UP
LIPID PNL WITH DIRECT LDL SERPL: 47 MG/DL — SIGNIFICANT CHANGE UP
MAGNESIUM SERPL-MCNC: 1.8 MG/DL — SIGNIFICANT CHANGE UP (ref 1.6–2.6)
MCHC RBC-ENTMCNC: 30.9 PG — SIGNIFICANT CHANGE UP (ref 27–34)
MCHC RBC-ENTMCNC: 33.1 GM/DL — SIGNIFICANT CHANGE UP (ref 32–36)
MCV RBC AUTO: 93.4 FL — SIGNIFICANT CHANGE UP (ref 80–100)
NITRITE UR-MCNC: NEGATIVE — SIGNIFICANT CHANGE UP
NON HDL CHOLESTEROL: 87 MG/DL — SIGNIFICANT CHANGE UP
NRBC # BLD: 0 /100 WBCS — SIGNIFICANT CHANGE UP (ref 0–0)
OSMOLALITY UR: 567 MOSM/KG — SIGNIFICANT CHANGE UP (ref 300–900)
PH UR: 6 — SIGNIFICANT CHANGE UP (ref 5–8)
PHOSPHATE SERPL-MCNC: 3.5 MG/DL — SIGNIFICANT CHANGE UP (ref 2.5–4.5)
PLATELET # BLD AUTO: 260 K/UL — SIGNIFICANT CHANGE UP (ref 150–400)
POTASSIUM SERPL-MCNC: 4.5 MMOL/L — SIGNIFICANT CHANGE UP (ref 3.5–5.3)
POTASSIUM SERPL-SCNC: 4.5 MMOL/L — SIGNIFICANT CHANGE UP (ref 3.5–5.3)
PROT ?TM UR-MCNC: 23 MG/DL — HIGH (ref 0–12)
PROT UR-MCNC: ABNORMAL MG/DL
PROT/CREAT UR-RTO: 0.3 RATIO — HIGH (ref 0–0.2)
RBC # BLD: 4.08 M/UL — LOW (ref 4.2–5.8)
RBC # FLD: 15.2 % — HIGH (ref 10.3–14.5)
RBC CASTS # UR COMP ASSIST: < 5 /HPF — SIGNIFICANT CHANGE UP
SODIUM SERPL-SCNC: 135 MMOL/L — SIGNIFICANT CHANGE UP (ref 135–145)
SODIUM UR-SCNC: 112 MMOL/L — SIGNIFICANT CHANGE UP
SP GR SPEC: 1.02 — SIGNIFICANT CHANGE UP (ref 1–1.03)
TRIGL SERPL-MCNC: 202 MG/DL — HIGH
TSH SERPL-MCNC: 2.97 UIU/ML — SIGNIFICANT CHANGE UP (ref 0.27–4.2)
UROBILINOGEN FLD QL: 0.2 E.U./DL — SIGNIFICANT CHANGE UP
UUN UR-MCNC: 656 MG/DL — SIGNIFICANT CHANGE UP
WBC # BLD: 11.25 K/UL — HIGH (ref 3.8–10.5)
WBC # FLD AUTO: 11.25 K/UL — HIGH (ref 3.8–10.5)
WBC UR QL: ABNORMAL /HPF

## 2023-08-11 PROCEDURE — 93306 TTE W/DOPPLER COMPLETE: CPT | Mod: 26

## 2023-08-11 PROCEDURE — 93312 ECHO TRANSESOPHAGEAL: CPT | Mod: 26

## 2023-08-11 PROCEDURE — 99233 SBSQ HOSP IP/OBS HIGH 50: CPT

## 2023-08-11 PROCEDURE — 70551 MRI BRAIN STEM W/O DYE: CPT | Mod: 26

## 2023-08-11 PROCEDURE — 99223 1ST HOSP IP/OBS HIGH 75: CPT

## 2023-08-11 RX ORDER — LOSARTAN POTASSIUM 100 MG/1
100 TABLET, FILM COATED ORAL DAILY
Refills: 0 | Status: DISCONTINUED | OUTPATIENT
Start: 2023-08-12 | End: 2023-08-12

## 2023-08-11 RX ORDER — BENZOCAINE AND MENTHOL 5; 1 G/100ML; G/100ML
1 LIQUID ORAL THREE TIMES A DAY
Refills: 0 | Status: DISCONTINUED | OUTPATIENT
Start: 2023-08-11 | End: 2023-08-11

## 2023-08-11 RX ORDER — LOSARTAN POTASSIUM 100 MG/1
50 TABLET, FILM COATED ORAL DAILY
Refills: 0 | Status: DISCONTINUED | OUTPATIENT
Start: 2023-08-11 | End: 2023-08-11

## 2023-08-11 RX ORDER — BENZOCAINE 10 %
1 GEL (GRAM) MUCOUS MEMBRANE ONCE
Refills: 0 | Status: COMPLETED | OUTPATIENT
Start: 2023-08-11 | End: 2023-08-11

## 2023-08-11 RX ORDER — ALPRAZOLAM 0.25 MG
0.25 TABLET ORAL ONCE
Refills: 0 | Status: DISCONTINUED | OUTPATIENT
Start: 2023-08-11 | End: 2023-08-11

## 2023-08-11 RX ORDER — BENZOCAINE AND MENTHOL 5; 1 G/100ML; G/100ML
1 LIQUID ORAL
Refills: 0 | Status: DISCONTINUED | OUTPATIENT
Start: 2023-08-11 | End: 2023-08-12

## 2023-08-11 RX ORDER — LABETALOL HCL 100 MG
10 TABLET ORAL ONCE
Refills: 0 | Status: COMPLETED | OUTPATIENT
Start: 2023-08-11 | End: 2023-08-11

## 2023-08-11 RX ADMIN — LOSARTAN POTASSIUM 50 MILLIGRAM(S): 100 TABLET, FILM COATED ORAL at 15:53

## 2023-08-11 RX ADMIN — TICAGRELOR 90 MILLIGRAM(S): 90 TABLET ORAL at 21:01

## 2023-08-11 RX ADMIN — BENZOCAINE AND MENTHOL 1 LOZENGE: 5; 1 LIQUID ORAL at 21:01

## 2023-08-11 RX ADMIN — BENZOCAINE AND MENTHOL 1 LOZENGE: 5; 1 LIQUID ORAL at 17:15

## 2023-08-11 RX ADMIN — ATORVASTATIN CALCIUM 80 MILLIGRAM(S): 80 TABLET, FILM COATED ORAL at 21:01

## 2023-08-11 RX ADMIN — Medication 10 MILLIGRAM(S): at 10:29

## 2023-08-11 RX ADMIN — BENZOCAINE AND MENTHOL 1 LOZENGE: 5; 1 LIQUID ORAL at 23:12

## 2023-08-11 RX ADMIN — Medication 81 MILLIGRAM(S): at 15:53

## 2023-08-11 RX ADMIN — ENOXAPARIN SODIUM 40 MILLIGRAM(S): 100 INJECTION SUBCUTANEOUS at 15:54

## 2023-08-11 RX ADMIN — TICAGRELOR 90 MILLIGRAM(S): 90 TABLET ORAL at 10:28

## 2023-08-11 RX ADMIN — Medication 0.25 MILLIGRAM(S): at 23:10

## 2023-08-11 RX ADMIN — Medication 1 SPRAY(S): at 22:54

## 2023-08-11 NOTE — OCCUPATIONAL THERAPY INITIAL EVALUATION ADULT - PLANNED THERAPY INTERVENTIONS, OT EVAL
further assessment of functional mobility and transfers/ADL retraining/balance training/parent/caregiver training.../transfer training

## 2023-08-11 NOTE — OCCUPATIONAL THERAPY INITIAL EVALUATION ADULT - PERTINENT HX OF CURRENT PROBLEM, REHAB EVAL
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 Wife reports she noted L facial and slurred speech x 5-6 hours 3 days ago as well. Reports compliance with home meds.

## 2023-08-11 NOTE — OCCUPATIONAL THERAPY INITIAL EVALUATION ADULT - GENERAL OBSERVATIONS, REHAB EVAL
Pt received semisupine in bed +hep lock +call bell +bed alarm +NAD +cleared by MACI Norman for OT and OOB activity. Pt left as found, all lines intact, RN aware. MRS 4. Pt received semisupine in bed +hep lock +call bell +bed alarm +NAD +cleared by MACI Norman for OT and OOB activity. Pt left as found, all lines intact, RN meek.

## 2023-08-11 NOTE — OCCUPATIONAL THERAPY INITIAL EVALUATION ADULT - MODALITIES TREATMENT COMMENTS
MRS 4. Pt demonstrated fair tolerance to OT IE this date with overall contents of session limited by BP above parameters with RN Ester and Stroke ACP Vaughn made aware. Pt is R hand dominant and wears glasses only for reading. Pt presenting with reduced coordination of LUE noted in finger to nose assessment as well as during TIFFANY screen. Pt without noted loss of sensation in BUE and MMT grossly 5/5 bilaterally. Pt with slight reduced  strength on L however this did not impact ability to manipulate ADL objects during LE dressing. Pt demonstrates good sitting balance and tolerated positioning for approximately 12 minutes. OT plan pending further assessment of functional mobility and transfers. Pt demonstrated fair tolerance to OT IE this date with overall contents of session limited by BP above parameters with RN Ester and Stroke ACP Vaughn made aware. Pt is R hand dominant and wears glasses only for reading. Pt presenting with reduced coordination of LUE noted in finger to nose assessment as well as during TIFFANY screen. Pt without noted loss of sensation in BUE and MMT grossly 5/5 bilaterally. Pt with slight reduced  strength on L however this did not impact ability to manipulate ADL objects during LE dressing. Pt demonstrates good sitting balance and tolerated positioning for approximately 12 minutes. OT plan pending further assessment of functional mobility and transfers.

## 2023-08-11 NOTE — OCCUPATIONAL THERAPY INITIAL EVALUATION ADULT - DIAGNOSIS, OT EVAL
Pt admitted to [facility] with complaints of L sided weakness, found to have acute corona radiata lacunar infarct. OT consulted for LUE weakness, dysmetria, and reduced sensation impacting ADL performance and functional transfers.

## 2023-08-11 NOTE — OCCUPATIONAL THERAPY INITIAL EVALUATION ADULT - ADDITIONAL COMMENTS
Pt lives with spouse in first floor apartment with 0 ALBER and tub in bathroom. Pt independent at baseline, performing all ADL and IADL without assist. Pt does not require device for functional mobility. Pt lives with spouse in first floor apartment with 0 ALBER and tub in bathroom. Pt independent at baseline, performing all ADL and IADL without assist. Pt does not require device for functional mobility. Pt is R handed and wears glasses to read.

## 2023-08-11 NOTE — PROGRESS NOTE ADULT - NS ATTEND AMEND GEN_ALL_CORE FT
The patient is a 78 year old male with PMH of HTN, HLD, T2DM, L cerebellar stroke in 2019 (h/o vertebrobasilar disease, on aspirin/Plavix), CAD s/p CABG, and bladder cancer s/p resection admitted with L hemiparesis X1-2 weeks. HCT showed ?subacute R CR ischemic stroke. CTA with proximal bilateral VA occlusions/severe stenosis with some distal reconsitution, hypoplastic vertebrobasilar system, and nonstenotic ICAD elsewhere. Though stroke on CTH looks lacunar, will need to r/o cardioembolic causes as well as he was therapeutic on Plavix. TTE/EULALIO with LVH, without thrombus. For now, continue ASA+Brillinta, statin. MRI and ILR Pending.

## 2023-08-11 NOTE — PROGRESS NOTE ADULT - ASSESSMENT
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 NIHSS 1. CTH with new right corona radiata lacunar infarct, possibly source of symptoms. CTA with bilateral vertebral arteries are occluded (right V1 and left V2) segments, but chronic and with additional adaptive narrowing since 11/2019. Both sides reconstitute via cervical collaterals and/or retrograde flow on right. Interval smaller caliber of the vertebrobasilar system favored to be adaptive narrowing from now bilateral vertebral artery occlusion in the neck. Not a candidate for acute intervention. Admitted for stroke w/u.    Neuro  #CVA workup  - continue aspirin 81mg and continue brilinta 90mg BID daily (stroked through ASA and Plavix despite being therapeutic on both)  - ASA accumetrics: 521, P2Y12: 127  - continue atorvastatin 80mg daily  - q4hr stroke neuro checks and vitals  - obtain MRI Brain without contrast short stroke  - Stroke Code HCT Results: new right corona radiata lacunar infarct, possibly source of symptoms  - Stroke Code CTA Results: bilateral vertebral arteries are occluded (right V1 and left V2) segments, but chronic and with additional adaptive narrowing since 11/2019. Both sides reconstitute via cervical collaterals and/or retrograde flow on right. Interval smaller caliber of the vertebrobasilar system favored to be adaptive narrowing from now bilateral vertebral artery occlusion in the neck  - Stroke education    Cards  #HTN  - permissive hypertension, Goal -180  - hold home blood pressure medication for now  - obtain TTE with bubble and EULALIO  - EP consulted for ILR on 8/11 - would like MRI completed prior to implanting. Dr. Ish PITTS with ILR placement as an outpatient if cannot be done in hospital    #HLD  - high dose statin as above in CVA  - LDL results: 47    Pulm  - call provider if SPO2 < 94%    GI  #Nutrition/Fluids/Electrolytes   - replete K<4 and Mg <2  - Diet: CC diet    Renal  - trend BMP    Infectious Disease  #WBC 12.28  - trend CBC  - afebrile, denies cough, denies urinary sx    Endocrine  #DM  - A1C results: 6.1%  - ISS    - TSH results: 2.970    DVT Prophylaxis  - lovenox sq for DVT prophylaxis   - SCDs for DVT prophylaxis       Dispo: pending PT/OT eval     Discussed daily hospital plans and goals with patient and wife at bedside.      Discussed with Dr. 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 NIHSS 1. CTH with new right corona radiata lacunar infarct, possibly source of symptoms. CTA with bilateral vertebral arteries are occluded (right V1 and left V2) segments, but chronic and with additional adaptive narrowing since 11/2019. Both sides reconstitute via cervical collaterals and/or retrograde flow on right. Interval smaller caliber of the vertebrobasilar system favored to be adaptive narrowing from now bilateral vertebral artery occlusion in the neck. Not a candidate for acute intervention. Admitted for stroke w/u.    Neuro  #CVA workup  - continue aspirin 81mg and continue brilinta 90mg BID daily (stroked through ASA and Plavix despite being therapeutic on both)  - ASA accumetrics: 521, P2Y12: 127  - continue atorvastatin 80mg daily  - q4hr stroke neuro checks and vitals  - obtain MRI Brain without contrast short stroke  - Stroke Code HCT Results: new right corona radiata lacunar infarct, possibly source of symptoms  - Stroke Code CTA Results: bilateral vertebral arteries are occluded (right V1 and left V2) segments, but chronic and with additional adaptive narrowing since 11/2019. Both sides reconstitute via cervical collaterals and/or retrograde flow on right. Interval smaller caliber of the vertebrobasilar system favored to be adaptive narrowing from now bilateral vertebral artery occlusion in the neck  - Stroke education    Cards  #HTN  - permissive hypertension, Goal -180  - hold home blood pressure medication for now  - obtain TTE with bubble and EULALIO  - EP consulted for ILR on 8/11 - would like MRI completed prior to implanting. Dr. Ish PITTS with ILR placement as an outpatient if cannot be done in hospital    #HLD  - high dose statin as above in CVA  - LDL results: 47    Pulm  - call provider if SPO2 < 94%    GI  #Nutrition/Fluids/Electrolytes   - replete K<4 and Mg <2  - Diet: CC diet    Renal  - trend BMP    #elevated SCr (new since 2020)  - f/u urine studies    Infectious Disease  #WBC 12.28  - trend CBC  - afebrile, denies cough, denies urinary sx    Endocrine  #DM  - A1C results: 6.1%  - ISS    - TSH results: 2.970    DVT Prophylaxis  - lovenox sq for DVT prophylaxis   - SCDs for DVT prophylaxis       Dispo: pending PT/OT eval     Discussed daily hospital plans and goals with patient and wife at bedside.      Discussed with Dr. Marilyn Deng

## 2023-08-11 NOTE — SBIRT NOTE ADULT - NSSBIRTUNABLESCROTHER_GEN_A_CORE
Ref. no appropriate patient SBIRT score of 3, patient reports having 1-2 drinks less than once a month.

## 2023-08-11 NOTE — OCCUPATIONAL THERAPY INITIAL EVALUATION ADULT - SENSORY TESTS
Cranial Nerves II - XII: II: PERRLA; visual fields are full to confrontation III, IV, VI: EOMI, no nystagmus appreciated V: facial sensation intact to light touch V1-V3 b/l VII: no ptosis, no facial droop, symmetric eyebrow raise and closure VIII: hearing intact to finger rub b/l  XI: head turning and shoulder shrug intact b/l XII: tongue protrusion midline Cranial Nerves II - XII: II: PERRLA; visual fields are full to confrontation III, IV, VI: EOMI, no nystagmus appreciated V: facial sensation intact to light touch V1-V3 b/l VII: no ptosis, no facial droop, symmetric eyebrow raise and closure VIII: hearing intact to finger rub b/l  XI: head turning and shoulder shrug intact b/l XII: tongue protrusion midline; vision (H test) smooth bilateral pursuits. Vision quadrant test WFL.

## 2023-08-11 NOTE — OCCUPATIONAL THERAPY INITIAL EVALUATION ADULT - MODIFIED CLINICAL TEST OF SENSORY INTEGRATION IN BALANCE TEST
Pt performed one sit to stand with two lateral steps to reposition in bed with supervision. Further functional mobility held 2/2 BP above parameters; Stroke ACP Vaughn made aware.

## 2023-08-12 ENCOUNTER — TRANSCRIPTION ENCOUNTER (OUTPATIENT)
Age: 78
End: 2023-08-12

## 2023-08-12 VITALS — TEMPERATURE: 98 F

## 2023-08-12 LAB
ANION GAP SERPL CALC-SCNC: 14 MMOL/L — SIGNIFICANT CHANGE UP (ref 5–17)
BUN SERPL-MCNC: 25 MG/DL — HIGH (ref 7–23)
CALCIUM SERPL-MCNC: 9.2 MG/DL — SIGNIFICANT CHANGE UP (ref 8.4–10.5)
CHLORIDE SERPL-SCNC: 102 MMOL/L — SIGNIFICANT CHANGE UP (ref 96–108)
CO2 SERPL-SCNC: 20 MMOL/L — LOW (ref 22–31)
CREAT SERPL-MCNC: 1.4 MG/DL — HIGH (ref 0.5–1.3)
EGFR: 52 ML/MIN/1.73M2 — LOW
GLUCOSE BLDC GLUCOMTR-MCNC: 160 MG/DL — HIGH (ref 70–99)
GLUCOSE BLDC GLUCOMTR-MCNC: 212 MG/DL — HIGH (ref 70–99)
GLUCOSE SERPL-MCNC: 159 MG/DL — HIGH (ref 70–99)
HCT VFR BLD CALC: 39 % — SIGNIFICANT CHANGE UP (ref 39–50)
HGB BLD-MCNC: 12.8 G/DL — LOW (ref 13–17)
MAGNESIUM SERPL-MCNC: 1.9 MG/DL — SIGNIFICANT CHANGE UP (ref 1.6–2.6)
MCHC RBC-ENTMCNC: 30.5 PG — SIGNIFICANT CHANGE UP (ref 27–34)
MCHC RBC-ENTMCNC: 32.8 GM/DL — SIGNIFICANT CHANGE UP (ref 32–36)
MCV RBC AUTO: 92.9 FL — SIGNIFICANT CHANGE UP (ref 80–100)
NRBC # BLD: 0 /100 WBCS — SIGNIFICANT CHANGE UP (ref 0–0)
PHOSPHATE SERPL-MCNC: 4.1 MG/DL — SIGNIFICANT CHANGE UP (ref 2.5–4.5)
PLATELET # BLD AUTO: 301 K/UL — SIGNIFICANT CHANGE UP (ref 150–400)
POTASSIUM SERPL-MCNC: 4.5 MMOL/L — SIGNIFICANT CHANGE UP (ref 3.5–5.3)
POTASSIUM SERPL-SCNC: 4.5 MMOL/L — SIGNIFICANT CHANGE UP (ref 3.5–5.3)
RBC # BLD: 4.2 M/UL — SIGNIFICANT CHANGE UP (ref 4.2–5.8)
RBC # FLD: 15.3 % — HIGH (ref 10.3–14.5)
SODIUM SERPL-SCNC: 136 MMOL/L — SIGNIFICANT CHANGE UP (ref 135–145)
WBC # BLD: 16.03 K/UL — HIGH (ref 3.8–10.5)
WBC # FLD AUTO: 16.03 K/UL — HIGH (ref 3.8–10.5)

## 2023-08-12 PROCEDURE — 93306 TTE W/DOPPLER COMPLETE: CPT

## 2023-08-12 PROCEDURE — 80048 BASIC METABOLIC PNL TOTAL CA: CPT

## 2023-08-12 PROCEDURE — 83735 ASSAY OF MAGNESIUM: CPT

## 2023-08-12 PROCEDURE — 84100 ASSAY OF PHOSPHORUS: CPT

## 2023-08-12 PROCEDURE — 84540 ASSAY OF URINE/UREA-N: CPT

## 2023-08-12 PROCEDURE — 83935 ASSAY OF URINE OSMOLALITY: CPT

## 2023-08-12 PROCEDURE — 92523 SPEECH SOUND LANG COMPREHEN: CPT

## 2023-08-12 PROCEDURE — 70450 CT HEAD/BRAIN W/O DYE: CPT | Mod: ME

## 2023-08-12 PROCEDURE — 84443 ASSAY THYROID STIM HORMONE: CPT

## 2023-08-12 PROCEDURE — 71045 X-RAY EXAM CHEST 1 VIEW: CPT

## 2023-08-12 PROCEDURE — 70496 CT ANGIOGRAPHY HEAD: CPT | Mod: ME

## 2023-08-12 PROCEDURE — 99238 HOSP IP/OBS DSCHRG MGMT 30/<: CPT

## 2023-08-12 PROCEDURE — 82962 GLUCOSE BLOOD TEST: CPT

## 2023-08-12 PROCEDURE — 99285 EMERGENCY DEPT VISIT HI MDM: CPT

## 2023-08-12 PROCEDURE — 93312 ECHO TRANSESOPHAGEAL: CPT

## 2023-08-12 PROCEDURE — 71045 X-RAY EXAM CHEST 1 VIEW: CPT | Mod: 26

## 2023-08-12 PROCEDURE — 85576 BLOOD PLATELET AGGREGATION: CPT

## 2023-08-12 PROCEDURE — 80061 LIPID PANEL: CPT

## 2023-08-12 PROCEDURE — 85027 COMPLETE CBC AUTOMATED: CPT

## 2023-08-12 PROCEDURE — 85730 THROMBOPLASTIN TIME PARTIAL: CPT

## 2023-08-12 PROCEDURE — 70551 MRI BRAIN STEM W/O DYE: CPT

## 2023-08-12 PROCEDURE — 85025 COMPLETE CBC W/AUTO DIFF WBC: CPT

## 2023-08-12 PROCEDURE — 85610 PROTHROMBIN TIME: CPT

## 2023-08-12 PROCEDURE — 97165 OT EVAL LOW COMPLEX 30 MIN: CPT

## 2023-08-12 PROCEDURE — 81001 URINALYSIS AUTO W/SCOPE: CPT

## 2023-08-12 PROCEDURE — 70498 CT ANGIOGRAPHY NECK: CPT | Mod: ME

## 2023-08-12 PROCEDURE — 83036 HEMOGLOBIN GLYCOSYLATED A1C: CPT

## 2023-08-12 PROCEDURE — 84156 ASSAY OF PROTEIN URINE: CPT

## 2023-08-12 PROCEDURE — G1004: CPT

## 2023-08-12 PROCEDURE — 82570 ASSAY OF URINE CREATININE: CPT

## 2023-08-12 PROCEDURE — 36415 COLL VENOUS BLD VENIPUNCTURE: CPT

## 2023-08-12 PROCEDURE — 84300 ASSAY OF URINE SODIUM: CPT

## 2023-08-12 PROCEDURE — 97161 PT EVAL LOW COMPLEX 20 MIN: CPT

## 2023-08-12 PROCEDURE — 99233 SBSQ HOSP IP/OBS HIGH 50: CPT

## 2023-08-12 RX ORDER — ATORVASTATIN CALCIUM 80 MG/1
3 TABLET, FILM COATED ORAL
Refills: 0 | DISCHARGE

## 2023-08-12 RX ORDER — LOSARTAN POTASSIUM 100 MG/1
100 TABLET, FILM COATED ORAL DAILY
Refills: 0 | Status: DISCONTINUED | OUTPATIENT
Start: 2023-08-12 | End: 2023-08-12

## 2023-08-12 RX ORDER — ATORVASTATIN CALCIUM 80 MG/1
1 TABLET, FILM COATED ORAL
Qty: 0 | Refills: 0 | DISCHARGE
Start: 2023-08-12

## 2023-08-12 RX ORDER — TICAGRELOR 90 MG/1
1 TABLET ORAL
Qty: 30 | Refills: 0
Start: 2023-08-12

## 2023-08-12 RX ORDER — TICAGRELOR 90 MG/1
1 TABLET ORAL
Qty: 60 | Refills: 0
Start: 2023-08-12

## 2023-08-12 RX ADMIN — BENZOCAINE AND MENTHOL 1 LOZENGE: 5; 1 LIQUID ORAL at 09:07

## 2023-08-12 RX ADMIN — TICAGRELOR 90 MILLIGRAM(S): 90 TABLET ORAL at 09:16

## 2023-08-12 RX ADMIN — LOSARTAN POTASSIUM 100 MILLIGRAM(S): 100 TABLET, FILM COATED ORAL at 09:17

## 2023-08-12 RX ADMIN — Medication 1: at 07:05

## 2023-08-12 RX ADMIN — Medication 2: at 11:26

## 2023-08-12 RX ADMIN — Medication 81 MILLIGRAM(S): at 09:17

## 2023-08-12 NOTE — PROGRESS NOTE ADULT - ASSESSMENT
78M with PMH of CAD s/p CABG x3, HTN, HLD, DM2, L PICA stroke, R vertebral artery stenosis, HARISH, bladder cancer and psoriatic arthritis presenting with LUE and LLE weakness, and admitted to stroke service for further workup.     #CVA  #R vertebral artery stenosis, old L PICA stroke  - new right corona radiata lacunar infarct on noncon head CT  - MRI confirms recent right infarct right corona radiata]  -on asp/brilinta  -TTE/EULALIO normal  - PT/OT consult  - telemetry monitoring  -further plan per stroke team    #CAD  #HTN  #HLD  - continue aspirin, brilinta and atorvastatin  - continue losartan 50mg daily    #DM2  - A1C is 6.1  - continue on sliding scale  -discharge on home meds    #leukocytosis  -suspect reactive to possible aspiration event related to EULALIO  -lungs clear on exam, satting on room air, patient asymptomatic  -monitor off abx    #Elevated creatinine  -creatinine back down to 1.4 today  -likely at baseline (has risk factors of DM, HTN) to develop CKD  -outpatient f/u with PCP    DC: home, likely today

## 2023-08-12 NOTE — PHYSICAL THERAPY INITIAL EVALUATION ADULT - ADDITIONAL COMMENTS
Pt lives in a house, 1 small step to enter, pt was IND PTA Pt lives in a house, 1 small step to enter, pt was IND PTA. Right handed

## 2023-08-12 NOTE — PHYSICAL THERAPY INITIAL EVALUATION ADULT - MANUAL MUSCLE TESTING RESULTS, REHAB EVAL
All UE/LE MMT 5/5/no strength deficits were identified Left UE shoulder flex 4+/5, left elbow flex 4+/5, all other MMT LE/UE 5/5/no strength deficits were identified

## 2023-08-12 NOTE — PROVIDER CONTACT NOTE (OTHER) - BACKGROUND
Hx HTN, HLD, DM, stroke, CABG. Came for weakness and dysarthria. CTH with Right corona radiata infarct

## 2023-08-12 NOTE — SPEECH LANGUAGE PATHOLOGY EVALUATION - SLP GENERAL OBSERVATIONS
Pt was seen fully awake and alert, sitting fully upright at the edge of bed, on room air. A&Ox3, followed simple directives, and communicated wants/needs.

## 2023-08-12 NOTE — PROGRESS NOTE ADULT - SUBJECTIVE AND OBJECTIVE BOX
SUBJECTIVE / INTERVAL HPI: Patient seen and examined at bedside. Reports that after EULALIO he experienced throat discomfort and was coughing/choking, some bloody secretions. All symptoms have now resolved. No cough, SOB, hemoptysis sore throat, chest pain.     VITAL SIGNS:  Vital Signs Last 24 Hrs  T(C): 36.9 (12 Aug 2023 09:06), Max: 37 (11 Aug 2023 17:48)  T(F): 98.5 (12 Aug 2023 09:06), Max: 98.6 (11 Aug 2023 17:48)  HR: 79 (12 Aug 2023 09:05) (63 - 79)  BP: 160/65 (12 Aug 2023 09:05) (117/63 - 165/73)  BP(mean): 93 (12 Aug 2023 09:05) (84 - 105)  RR: 20 (12 Aug 2023 09:05) (17 - 22)  SpO2: 96% (12 Aug 2023 09:05) (96% - 98%)    Parameters below as of 12 Aug 2023 09:05  Patient On (Oxygen Delivery Method): room air        08-11-23 @ 07:01  -  08-12-23 @ 07:00  --------------------------------------------------------  IN: 0 mL / OUT: 1200 mL / NET: -1200 mL    08-12-23 @ 07:01  - 08-12-23 @ 12:18  --------------------------------------------------------  IN: 380 mL / OUT: 300 mL / NET: 80 mL        PHYSICAL EXAM:    General: WDWN  HEENT: NC/AT; PERRL, anicteric sclera; MMM  Neck: supple  Cardiovascular: +S1/S2; RRR  Respiratory: CTA B/L; no W/R/R  Gastrointestinal: soft, NT/ND; +BSx4  Extremities: WWP; no edema, clubbing or cyanosis  Vascular: 2+ radial, DP/PT pulses B/L  Neurological: AAOx3; no focal deficits    MEDICATIONS:  MEDICATIONS  (STANDING):  aspirin enteric coated 81 milliGRAM(s) Oral daily  atorvastatin 80 milliGRAM(s) Oral at bedtime  dextrose 5%. 1000 milliLiter(s) (50 mL/Hr) IV Continuous <Continuous>  dextrose 5%. 1000 milliLiter(s) (100 mL/Hr) IV Continuous <Continuous>  dextrose 50% Injectable 12.5 Gram(s) IV Push once  dextrose 50% Injectable 25 Gram(s) IV Push once  dextrose 50% Injectable 25 Gram(s) IV Push once  enoxaparin Injectable 40 milliGRAM(s) SubCutaneous every 24 hours  glucagon  Injectable 1 milliGRAM(s) IntraMuscular once  insulin lispro (ADMELOG) corrective regimen sliding scale   SubCutaneous three times a day before meals  insulin lispro (ADMELOG) corrective regimen sliding scale   SubCutaneous at bedtime  losartan 100 milliGRAM(s) Oral daily  ticagrelor 90 milliGRAM(s) Oral two times a day    MEDICATIONS  (PRN):  benzocaine/menthol Lozenge 1 Lozenge Oral every 2 hours PRN Sore Throat  dextrose Oral Gel 15 Gram(s) Oral once PRN Blood Glucose LESS THAN 70 milliGRAM(s)/deciliter      ALLERGIES:  Allergies    No Known Allergies    Intolerances        LABS:                        12.8   16.03 )-----------( 301      ( 12 Aug 2023 05:30 )             39.0     08-12    136  |  102  |  25<H>  ----------------------------<  159<H>  4.5   |  20<L>  |  1.40<H>    Ca    9.2      12 Aug 2023 05:30  Phos  4.1     08-12  Mg     1.9     08-12        Urinalysis Basic - ( 12 Aug 2023 05:30 )    Color: x / Appearance: x / SG: x / pH: x  Gluc: 159 mg/dL / Ketone: x  / Bili: x / Urobili: x   Blood: x / Protein: x / Nitrite: x   Leuk Esterase: x / RBC: x / WBC x   Sq Epi: x / Non Sq Epi: x / Bacteria: x      CAPILLARY BLOOD GLUCOSE      POCT Blood Glucose.: 212 mg/dL (12 Aug 2023 11:11)        RADIOLOGY & ADDITIONAL TESTS: Reviewed.    ASSESSMENT:    PLAN: 
Neurology Stroke Progress Note    INTERVAL HPI/OVERNIGHT EVENTS:  Patient seen and examined. NPO for EULALIO. Obtained outpatient EKG, no AFib noted (appears to have biphasic p-waves).     MEDICATIONS  (STANDING):  aspirin enteric coated 81 milliGRAM(s) Oral daily  atorvastatin 80 milliGRAM(s) Oral at bedtime  dextrose 5%. 1000 milliLiter(s) (50 mL/Hr) IV Continuous <Continuous>  dextrose 5%. 1000 milliLiter(s) (100 mL/Hr) IV Continuous <Continuous>  dextrose 50% Injectable 12.5 Gram(s) IV Push once  dextrose 50% Injectable 25 Gram(s) IV Push once  dextrose 50% Injectable 25 Gram(s) IV Push once  enoxaparin Injectable 40 milliGRAM(s) SubCutaneous every 24 hours  glucagon  Injectable 1 milliGRAM(s) IntraMuscular once  insulin lispro (ADMELOG) corrective regimen sliding scale   SubCutaneous three times a day before meals  insulin lispro (ADMELOG) corrective regimen sliding scale   SubCutaneous at bedtime  losartan 50 milliGRAM(s) Oral daily  ticagrelor 90 milliGRAM(s) Oral two times a day    MEDICATIONS  (PRN):  dextrose Oral Gel 15 Gram(s) Oral once PRN Blood Glucose LESS THAN 70 milliGRAM(s)/deciliter      Allergies    No Known Allergies    Intolerances        Vital Signs Last 24 Hrs  T(C): 36.8 (11 Aug 2023 09:05), Max: 36.9 (10 Aug 2023 17:58)  T(F): 98.3 (11 Aug 2023 09:05), Max: 98.5 (10 Aug 2023 21:42)  HR: 57 (11 Aug 2023 10:40) (57 - 70)  BP: 139/66 (11 Aug 2023 10:40) (113/69 - 197/91)  BP(mean): 95 (11 Aug 2023 10:40) (81 - 130)  RR: 20 (11 Aug 2023 10:40) (18 - 23)  SpO2: 96% (11 Aug 2023 10:40) (95% - 100%)    Parameters below as of 11 Aug 2023 10:40  Patient On (Oxygen Delivery Method): room air        Physical exam:  General: No acute distress, awake and alert  Eyes: Anicteric sclerae, moist conjunctivae, see below for CNs  Neck: trachea midline, FROM, supple, no thyromegaly or lymphadenopathy  Cardiovascular: Regular rate and rhythm, no murmurs, rubs, or gallops. No carotid bruits.   Pulmonary: Anterior breath sounds clear bilaterally, no crackles or wheezing. No use of accessory muscles  GI: Abdomen soft, non-distended, non-tender  Extremities: Radial and DP pulses +2, no edema    Neurologic:  Mental status: Awake, alert, oriented to person, place, and time. Speech is fluent with intact naming, repetition, and comprehension, mild dysarthria. Recent and remote memory intact. Follows commands. Attention/concentration intact.   -Cranial nerves:   II: Visual fields are full to confrontation.  III, IV, VI: Extraocular movements are intact without nystagmus. Pupils equally round and reactive to light  V:  Facial sensation V1-V3 equal and intact   VII: Slight left NLFF  Motor: Normal bulk and tone. No pronator drift. Strength bilateral upper extremity 5/5, bilateral lower extremities 5/5, some bobbing noted of LLE.   Sensation: Intact to light touch bilaterally. No neglect or extinction on double simultaneous testing.  Coordination: LUE dysmetria  Gait: deferred      NIHSS Score: 1    LABS:                        12.6   11.25 )-----------( 260      ( 11 Aug 2023 06:16 )             38.1     08-11    135  |  103  |  31<H>  ----------------------------<  150<H>  4.5   |  23  |  1.57<H>    Ca    9.8      11 Aug 2023 06:16  Phos  3.5     08-11  Mg     1.8     08-11      PT/INR - ( 10 Aug 2023 11:37 )   PT: 10.4 sec;   INR: 0.91          PTT - ( 10 Aug 2023 11:37 )  PTT:32.5 sec  Urinalysis Basic - ( 11 Aug 2023 06:16 )    Color: x / Appearance: x / SG: x / pH: x  Gluc: 150 mg/dL / Ketone: x  / Bili: x / Urobili: x   Blood: x / Protein: x / Nitrite: x   Leuk Esterase: x / RBC: x / WBC x   Sq Epi: x / Non Sq Epi: x / Bacteria: x        RADIOLOGY & ADDITIONAL TESTS:    < from: CT Head No Cont (08.10.23 @ 13:04) >    IMPRESSION:    New right corona radiata lacunar infarct, possibly source of symptoms. No   acute hemorrhage.    Chronic left PICA territory cerebellar infarct.    < end of copied text >

## 2023-08-12 NOTE — DISCHARGE NOTE PROVIDER - HOSPITAL COURSE
Hospital course:  78y Male with PMH HTN, HLD, T2DM, L cerebellar stroke in 2019 (h/o vertebrobasilar disease, on aspirin/Plavix), CAD s/p CABG, and bladder cancer s/p resection admitted with LUE and LLE weakness x 1.5 weeks and transient L facial and dysarthria 3 days ago. NIHSS 1. HCT showed ?subacute R CR ischemic stroke. CTA with proximal bilateral VA occlusions/severe stenosis with some distal reconsitution, hypoplastic vertebrobasilar system, and nonstenotic ICAD elsewhere. Though stroke on CTH looks lacunar, will need to r/o cardioembolic causes as well as he was therapeutic on Plavix. Now switched to ASA+Brillinta. TTE/EULALIO with LVH, without thrombus.  MRI confirmed recent infarct at right corona radiata. Plan to have ILR placed outpatient and continue with ASA+Brillinta.       Discharge NIHSS:     During this hospital course, patient had an ischemic stroke located in right corona radiata as seen on MRI/CT.   The stroke etiology is likely secondary to:  [X]etiology workup still in progress    Patient had the following workup done in house:  CT Head: New right corona radiata lacunar infarct, possibly source of symptoms. No acute hemorrhage.  MR Head Non Contrast: Recent infarct DWI+ at right corona radiata  CT Angio Head: No large vessel occlusion. Interval smaller caliber of the vertebrobasilar system favored to be adaptive narrowing from now bilateral vertebral artery occlusion in the neck. No significant anterior circulation steno-occlusive disease, with mild-moderate scattered stenoses grossly unchanged.  CT Angio Neck: Bilateral vertebral arteries are occluded (right V1 and left V2) segments, but chronic and with additional adaptive narrowing since 11/2019. Both sides reconstitute via cervical collaterals and/or retrograde flow on right.    [X]echo: Normal LV size, moderate LVH. Normal LV EF. Aortic sclerosis w/o significant stenosis. Mild aortic regurg. No PFO.   [x] EULALIO: LV EF 60%. Mild symmetric LVH. No thrombus seen. No intracardiac shunt.   [x]labs  A1C with Estimated Average Glucose Result: 6.1  LDL Cholesterol Calculated: 47 mg/dL  Thyroid Stimulating Hormone, Serum: 2.970 uIU/mL    []other    Physical exam at discharge:    New medications on discharge: Brilinta 90mg BID  Labs to be followed up:  Imaging to be done as outpatient:  Further outpatient workup: Needs ILR placed outpatient   Hospital course:  78y Male with PMH HTN, HLD, T2DM, L cerebellar stroke in 2019 (h/o vertebrobasilar disease, on aspirin/Plavix), CAD s/p CABG, and bladder cancer s/p resection admitted with LUE and LLE weakness x 1.5 weeks and transient L facial and dysarthria 3 days ago. NIHSS 1. HCT showed ?subacute R CR ischemic stroke. CTA with proximal bilateral VA occlusions/severe stenosis with some distal reconsitution, hypoplastic vertebrobasilar system, and nonstenotic ICAD elsewhere. Though stroke on CTH looks lacunar, will need to r/o cardioembolic causes as well as he was therapeutic on Plavix. Now switched to ASA+Brillinta. TTE/EULALIO with LVH, without thrombus.  MRI confirmed recent infarct at right corona radiata. Plan to have ILR placed outpatient and continue with ASA+Brillinta.       Discharge NIHSS:     During this hospital course, patient had an ischemic stroke located in right corona radiata as seen on MRI/CT.   The stroke etiology is likely secondary to:  [X]etiology workup still in progress    Patient had the following workup done in house:  CT Head: New right corona radiata lacunar infarct, possibly source of symptoms. No acute hemorrhage.  MR Head Non Contrast: Recent infarct DWI+ at right corona radiata  CT Angio Head: No large vessel occlusion. Interval smaller caliber of the vertebrobasilar system favored to be adaptive narrowing from now bilateral vertebral artery occlusion in the neck. No significant anterior circulation steno-occlusive disease, with mild-moderate scattered stenoses grossly unchanged.  CT Angio Neck: Bilateral vertebral arteries are occluded (right V1 and left V2) segments, but chronic and with additional adaptive narrowing since 11/2019. Both sides reconstitute via cervical collaterals and/or retrograde flow on right.    [X]echo: Normal LV size, moderate LVH. Normal LV EF. Aortic sclerosis w/o significant stenosis. Mild aortic regurg. No PFO.   [x] EULALIO: LV EF 60%. Mild symmetric LVH. No thrombus seen. No intracardiac shunt.   [x]labs  A1C with Estimated Average Glucose Result: 6.1  LDL Cholesterol Calculated: 47 mg/dL  Thyroid Stimulating Hormone, Serum: 2.970 uIU/mL    []other    Physical exam at discharge:  -Mental status: Awake, alert, oriented to person, place, and time. Speech is fluent with intact naming, repetition, and comprehension, mild dysarthria. Recent and remote memory intact. Follows commands. Attention/concentration intact.   -Cranial nerves:   II: Visual fields are full to confrontation.  III, IV, VI: Extraocular movements are intact without nystagmus. Pupils equally round and reactive to light  V:  Facial sensation V1-V3 equal and intact   VII: L NLFF  VIII: Hearing is bilaterally intact to finger rub  IX, X: Uvula is midline and soft palate rises symmetrically  XI: Head turning and shoulder shrug are intact.  XII: Tongue protrudes midline  Motor: Normal bulk and tone. No pronator drift. Strength bilateral upper extremity 5/5, bilateral lower extremities 5/5.  Rapid alternating movements intact and symmetric  Sensation: Intact to light touch bilaterally. No neglect or extinction on double simultaneous testing.  Coordination: LUE dysmetria  Gait: deferred      NIHSS Score: 3    New medications on discharge: Brilinta 90mg BID  Labs to be followed up:  Imaging to be done as outpatient:  Further outpatient workup: Needs ILR placed outpatient   Hospital course:  78y Male with PMH HTN, HLD, T2DM, L cerebellar stroke in 2019 (h/o vertebrobasilar disease, on aspirin/Plavix), CAD s/p CABG, and bladder cancer s/p resection admitted with LUE and LLE weakness x 1.5 weeks and transient L facial and dysarthria 3 days ago. NIHSS 1. HCT showed ?subacute R CR ischemic stroke. CTA with proximal bilateral VA occlusions/severe stenosis with some distal reconsitution, hypoplastic vertebrobasilar system, and nonstenotic ICAD elsewhere. Though stroke on CTH looks lacunar, will need to r/o cardioembolic causes as well as he was therapeutic on Plavix. Now switched to ASA+Brillinta. TTE/EULALIO with LVH, without thrombus.  MRI confirmed recent infarct at right corona radiata. Plan to have ILR placed outpatient and continue with ASA+Brillinta.     During this hospital course, patient had an ischemic stroke located in right corona radiata as seen on MRI/CT.   The stroke etiology is likely secondary to:  [X]etiology workup still in progress    Patient had the following workup done in house:  CT Head: New right corona radiata lacunar infarct, possibly source of symptoms. No acute hemorrhage.  MR Head Non Contrast: Recent infarct DWI+ at right corona radiata  CT Angio Head: No large vessel occlusion. Interval smaller caliber of the vertebrobasilar system favored to be adaptive narrowing from now bilateral vertebral artery occlusion in the neck. No significant anterior circulation steno-occlusive disease, with mild-moderate scattered stenoses grossly unchanged.  CT Angio Neck: Bilateral vertebral arteries are occluded (right V1 and left V2) segments, but chronic and with additional adaptive narrowing since 11/2019. Both sides reconstitute via cervical collaterals and/or retrograde flow on right.    [X]echo: Normal LV size, moderate LVH. Normal LV EF. Aortic sclerosis w/o significant stenosis. Mild aortic regurg. No PFO.   [x] EULALIO: LV EF 60%. Mild symmetric LVH. No thrombus seen. No intracardiac shunt.   [x]labs  A1C with Estimated Average Glucose Result: 6.1  LDL Cholesterol Calculated: 47 mg/dL  Thyroid Stimulating Hormone, Serum: 2.970 uIU/mL    []other    Physical exam at discharge:  -Mental status: Awake, alert, oriented to person, place, and time. Speech is fluent with intact naming, repetition, and comprehension, mild dysarthria. Recent and remote memory intact. Follows commands. Attention/concentration intact.   -Cranial nerves:   II: Visual fields are full to confrontation.  III, IV, VI: Extraocular movements are intact without nystagmus. Pupils equally round and reactive to light  V:  Facial sensation V1-V3 equal and intact   VII: L NLFF  VIII: Hearing is bilaterally intact to finger rub  IX, X: Uvula is midline and soft palate rises symmetrically  XI: Head turning and shoulder shrug are intact.  XII: Tongue protrudes midline  Motor: Normal bulk and tone. No pronator drift. Strength bilateral upper extremity 5/5, bilateral lower extremities 5/5.  Rapid alternating movements intact and symmetric  Sensation: Intact to light touch bilaterally. No neglect or extinction on double simultaneous testing.  Coordination: LUE dysmetria  Gait: deferred      NIHSS Score: 3    New medications on discharge: Brilinta 90mg BID  Labs to be followed up:  Imaging to be done as outpatient:  Further outpatient workup: Needs ILR placed outpatient

## 2023-08-12 NOTE — SPEECH LANGUAGE PATHOLOGY EVALUATION - SLP PERTINENT HISTORY OF CURRENT PROBLEM
PMHx of HTN, HLD, DM2 (on metformin), L PICA stroke (2019), R vertebral artery stenosis (s/p DCA 1/25/20), HARISH (does not use CPAP), CABG x 3, psoriatic arthritis, spinal stenosis (s/p laminectomy 2014), bladder CA (dx in 2020 had tumor resected) who presented to [facility] ED on 8/10/23 with c/o LUE and LLE weakness x 1.5 weeks and transient L facial and dysarthria 3 days ago. CTH with new right corona radiata lacunar infarct, possibly source of symptoms. Pt was not a candidate for acute intervention and admitted for stroke w/u.

## 2023-08-12 NOTE — SPEECH LANGUAGE PATHOLOGY EVALUATION - SLP DIAGNOSIS
Per the [facility] cognitive linguistic assessment/screen, WNL abilities in the areas of short term memory, attention, mental manipulation/word problem solving, temporal, visual-spatial, and executive functioning. Pt also denied any concerns. No dysarthria noted. Pt encouraged to seek outpatient speech pathology intervention/standardized cognitive assessment if concerns arise re: higher level cognitive linguistic abilities. No further acute intervention deemed warranted at this time.

## 2023-08-12 NOTE — DISCHARGE NOTE NURSING/CASE MANAGEMENT/SOCIAL WORK - PATIENT PORTAL LINK FT
You can access the FollowMyHealth Patient Portal offered by [facility] by registering at the following website: [email or web address]. By joining [facility]’s FollowMyHealth portal, you will also be able to view your health information using other applications (apps) compatible with our system.

## 2023-08-12 NOTE — DISCHARGE NOTE PROVIDER - NSDCCPCAREPLAN_GEN_ALL_CORE_FT
PRINCIPAL DISCHARGE DIAGNOSIS  Diagnosis: CVA (cerebrovascular accident)  Assessment and Plan of Treatment: During this hospital admission, you had an ischemic stroke. During an ischemic stroke, blood stops flowing to part of your brain because of a blockage in the blood vessel. This can damage areas in the brain that control other parts of the body.  Please take your aspirin and Brilinta for blood thinning and Atorvastatin for cholesterol medication/blood vessel protection as prescribed to prevent further strokes. Do not skip doses and do not run low on your medication. If you run low on your medication, please contact your doctor.  You will follow up outpatient with the stroke Nurse Practitioner outpatient, someone from the office will get in touch with your to let you know when your appointment will be.  Doing your regular tasks may be difficult after you've had a stroke, but you can learn new ways to manage your daily activities. In fact, doing daily activities may help you to regain muscle strength. Be patient, give yourself time to adjust, and appreciate the progress you make. For example, when showering or bathing, test the water temperature with a hand or foot that was not affected by the stroke, use grab bars, a shower seat, a hand-held showerhead, etc. It is normal to feel fatigue after a stroke, while some days may be worse than others, you will continue to improve.  Call 911 right away if you have any of the following symptoms of another stroke:  B: Balance: Sudden: Dizziness, loss of balance, or a sense of falling, difficulty with coordinating movement  E: Eyes: Sudden double vision or trouble seeing in one or both eyes  F: Face: Sudden uneven face  A: Arms (Legs): Sudden weakness, tingling, or loss of feeling on one side of your face or body  S: Speech: Sudden trouble talking or slurred speech, sudden difficulty understanding others  T: Time: Please call 911 right away and go to the emergency room  •Sudden, severe headache  •Blackouts or seizures

## 2023-08-12 NOTE — SPEECH LANGUAGE PATHOLOGY EVALUATION - COMMENTS
INCOMPLETE Baseline info:   Pt lives with his spouse. 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 Prior to admission, Pt reports he actively drives and is independent with all ADLs. His highest level of education is an Associate's degree. The [employer] Mental Status ([facility]) Examination The [employer] Mental Status ([facility]) Examination (2006) was administered. The UMS is designed to assess the presence of cognitive dysfunction. It assesses cognitive domains including: orientation, attention, short term and working memory, recognition, calculations, visuospatial skills, and executive function.    Attention, immediate recall, orientation (Questions 1-3): 3/3  Numeric calculations and registration (Question 5): 3/3  Memory (Question 6): 3/3  Delayed recall with inference (Questions 4 and 7):  4/5  Registration (Question 8): 2/2  Visuospatial (Question 9 -10) 4/6  Executive function (Question 11): 6/8  Total Score 27/30    Range            High School Education or greater    Less than High School Education  Normal                        27-30                                                 20-30  Mild cog def               20-26                                 14-19  Sev cog def                 1-19                                                    1-14 The [employer] Mental Status ([facility]) Examination The [employer] Mental Status ([facility]) Examination (2006) was administered. The UMS is designed to assess the presence of cognitive dysfunction. It assesses cognitive domains including: orientation, attention, short term and working memory, recognition, calculations, visuospatial skills, and executive function.    Attention, immediate recall, orientation (Questions 1-3): 3/3  Numeric calculations and registration (Question 5): 3/3  Memory (Question 6): 3/3  Delayed recall with inference (Questions 4 and 7):  4/5  Registration (Question 8): 2/2  Visuospatial (Question 9 -10) 4/6  Executive function (Question 11): 6/8  Total Score 27/30    Range            High School Education or greater    Less than High School Education  Normal              27-30                                         20-30  Mild cog def      20-26                                         14-19  Sev cog def      1-19                                            1-14 The [employer] Mental Status ([facility]) Examination The [employer] Mental Status ([facility]) Examination (2006) was administered. The UMS is designed to assess the presence of cognitive dysfunction. It assesses cognitive domains including: orientation, attention, short term and working memory, recognition, calculations, visuospatial skills, and executive function.    Attention, immediate recall, orientation (Questions 1-3): 3/3  Numeric calculations and registration (Question 5): 3/3  Memory (Question 6): 3/3  Delayed recall with inference (Questions 4 and 7):  4/5  Registration (Question 8): 2/2  Visuospatial (Question 9 -10) 4/6  Executive function (Question 11): 6/8  Total Score 27/30    Range  High School Education or greater    Less than High School Education  Normal              27-30                                         20-30  Mild cog def      20-26                                         14-19  Sev cog def      1-19                                            1-14

## 2023-08-12 NOTE — CONSULT NOTE ADULT - ASSESSMENT
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 NIHSS 1. CTH with new right corona radiata lacunar infarct, possibly source of symptoms. CTA with bilateral vertebral arteries are occluded (right V1 and left V2) segments, but chronic and with additional adaptive narrowing since 11/2019. Both sides reconstitute via cervical collaterals and/or retrograde flow on right. Interval smaller caliber of the vertebrobasilar system favored to be adaptive narrowing from now bilateral vertebral artery occlusion in the neck. Not a candidate for acute intervention. Admitted for stroke w/u.    Neuro  #CVA workup  - continue aspirin 81mg and continue brilinta 90mg BID daily (stroked through ASA and Plavix despite being therapeutic on both)  - ASA accumetrics: 521, P2Y12: 127  - continue atorvastatin 80mg daily  - q4hr stroke neuro checks and vitals  - obtain MRI Brain without contrast short stroke  - Stroke Code HCT Results: new right corona radiata lacunar infarct, possibly source of symptoms  - Stroke Code CTA Results: bilateral vertebral arteries are occluded (right V1 and left V2) segments, but chronic and with additional adaptive narrowing since 11/2019. Both sides reconstitute via cervical collaterals and/or retrograde flow on right. Interval smaller caliber of the vertebrobasilar system favored to be adaptive narrowing from now bilateral vertebral artery occlusion in the neck  - Stroke education    Cards  #HTN  - permissive hypertension, Goal -180  - hold home blood pressure medication for now  - obtain TTE with bubble and EULALIO  - EP consulted for ILR on 8/11 - would like MRI completed prior to implanting. Dr. Ish PITTS with ILR placement as an outpatient if cannot be done in hospital    #HLD  - high dose statin as above in CVA  - LDL results: 47    Pulm  - call provider if SPO2 < 94%    GI  #Nutrition/Fluids/Electrolytes   - replete K<4 and Mg <2  - Diet: CC diet    Renal  - trend BMP    #elevated SCr (new since 2020)  - f/u urine studies    Infectious Disease  #WBC 12.28  - trend CBC  - afebrile, denies cough, denies urinary sx    Endocrine  #DM  - A1C results: 6.1%  - ISS    - TSH results: 2.970    DVT Prophylaxis  - lovenox sq for DVT prophylaxis   - SCDs for DVT prophylaxis       Dispo: pending PM&R ,   PT/OT eval   
78M with PMH of CAD s/p CABG x3, HTN, HLD, DM2, L PICA stroke, R vertebral artery stenosis, HARISH, bladder cancer and psoriatic arthritis presenting with LUE and LLE weakness, and admitted to stroke service for further workup.     #CVA  #R vertebral artery stenosis, old L PICA stroke  - new right corona radiata lacunar infarct on noncon head CT  - pending MRI, TTE and EULALIO, plan per neurology  - PT/OT consult  - telemetry monitoring    #CAD  #HTN  #HLD  - continue aspirin, brilinta and atorvastatin  - continue losartan 50mg daily    #DM2  - A1C is 6.1  - continue on sliding scale  - once med rec complete, and ready for discharge, can resume home medications    #Elevated creatinine  - obtain collateral from PCP, creatinine elevated at ~1.4 on admission and ~1.5 this morning. Get urine lytes, and follow up echocardiogram for EF.  He may be a little dehydrated given NPO status for EULALIO.  Potentially has new CKD, and does have risk factors with diabetes and hypertension.     80 minutes spent on this encounter, including face to face with patient, care coordination and documentation.  Plan of care discussed with stroke team.

## 2023-08-12 NOTE — DISCHARGE NOTE PROVIDER - CARE PROVIDER_API CALL
Valentine Clayton NP in Family Health  [address]  [place] [address]  Phone: [phone]  Fax: [phone]  Follow Up Time: 2 weeks

## 2023-08-12 NOTE — DISCHARGE NOTE PROVIDER - NSDCFUSCHEDAPPT_GEN_ALL_CORE_FT
Nancy Rush  [facility] Physician [facility]  [address] [id number]  Scheduled Appointment: 08/28/2023     [place] Pregnancy And Lactation Text: It is unknown if this medication is safe to use during pregnancy.  It is unknown if this medication is excreted in breast milk.  Breastfeeding women should use the topical cream on the smallest area of the skin for the shortest time needed while breastfeeding.  Do not apply to nipple and areola.

## 2023-08-12 NOTE — DISCHARGE NOTE NURSING/CASE MANAGEMENT/SOCIAL WORK - NSDCPEFALRISK_GEN_ALL_CORE
For information on Fall & Injury Prevention, visit: [email or web address] OR  [email or web address] OR  https://www.cdc.gov/steadi/patient.html

## 2023-08-12 NOTE — DISCHARGE NOTE NURSING/CASE MANAGEMENT/SOCIAL WORK - OTHER MODE OF TRANSPORTATION
Doctor Shaheed recommend that pt. be picked up, instead of driving but pt. insists on driving by self via parked car in hospital and Dr. Hummel made aware.

## 2023-08-12 NOTE — DISCHARGE NOTE PROVIDER - NSDCMRMEDTOKEN_GEN_ALL_CORE_FT
aspirin 81 mg oral tablet: 1 tab(s) orally once a day  atorvastatin 20 mg oral tablet: 3 tab(s) orally once a day (at bedtime)  clopidogrel 75 mg oral tablet: 1 tab(s) orally every 24 hours. **DO NOT RESTART UNTIL 2 WEEKS AFTER SURGERY ON 3/30 ** (ALSO RESTART ASPIRIN 81MG AT THIS TIME)  Cosentyx 150 mg/mL subcutaneous solution: 300 milligram(s) subcutaneously once a month  losartan 100 mg oral tablet: 1 tab(s) orally once a day  metFORMIN 500 mg oral tablet: 1 tab(s) orally 2 times a day  methotrexate 7.5 mg oral tablet: 1 tab(s) orally once a week   aspirin 81 mg oral tablet: 1 tab(s) orally once a day  atorvastatin 20 mg oral tablet: 3 tab(s) orally once a day (at bedtime)  Cosentyx 150 mg/mL subcutaneous solution: 300 milligram(s) subcutaneously once a month  losartan 100 mg oral tablet: 1 tab(s) orally once a day  metFORMIN 500 mg oral tablet: 1 tab(s) orally 2 times a day  methotrexate 7.5 mg oral tablet: 1 tab(s) orally once a week  ticagrelor 90 mg oral tablet: 1 tab(s) orally 2 times a day   aspirin 81 mg oral tablet: 1 tab(s) orally once a day  atorvastatin 80 mg oral tablet: 1 tab(s) orally once a day (at bedtime)  Cosentyx 150 mg/mL subcutaneous solution: 300 milligram(s) subcutaneously once a month  losartan 100 mg oral tablet: 1 tab(s) orally once a day  metFORMIN 500 mg oral tablet: 1 tab(s) orally 2 times a day  methotrexate 7.5 mg oral tablet: 1 tab(s) orally once a week  ticagrelor 90 mg oral tablet: 1 tab(s) orally 2 times a day

## 2023-08-12 NOTE — CONSULT NOTE ADULT - SUBJECTIVE AND OBJECTIVE BOX
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 Wife reports she noted L facial and slurred speech x 5-6 hours 3 days ago as well. Reports compliance with home meds.     PAST MEDICAL & SURGICAL HISTORY:  Hypertension  Psoriatic arthritis  DM (diabetes mellitus)  Cerebrovascular accident (CVA)  HARISH (obstructive sleep apnea)  Spinal stenosis  Anxiety  S/P appendectomy  Baker's cyst of knee, right  S/P colonoscopy with polypectomy  2013 adenoma  S/P laparoscopic cholecystectomy  H/O vertebral artery stenosis  R vertebral artery stenosis s/p DCA  S/P CABG     FAMILY HISTORY:  Family history of CVA  Mother @ 86, Father in 70s    Vital Signs Last 24 Hrs  T(C): 36.8 (11 Aug 2023 09:05), Max: 36.9 (10 Aug 2023 17:58)  T(F): 98.3 (11 Aug 2023 09:05), Max: 98.5 (10 Aug 2023 21:42)  HR: 57 (11 Aug 2023 10:40) (57 - 70)  BP: 139/66 (11 Aug 2023 10:40) (113/69 - 197/91)  BP(mean): 95 (11 Aug 2023 10:40) (81 - 130)  RR: 20 (11 Aug 2023 10:40) (18 - 23)  SpO2: 96% (11 Aug 2023 10:40) (95% - 100%)    Parameters below as of 11 Aug 2023 10:40  Patient On (Oxygen Delivery Method): room air      MEDICATION RECONCILIATION   MEDICATIONS  (STANDING):    MEDICATIONS  (PRN):    Allergies    No Known Allergies    Intolerances      Vital Signs Last 24 Hrs  T(C): 36.4 (10 Aug 2023 09:57), Max: 36.4 (10 Aug 2023 09:57)  T(F): 97.5 (10 Aug 2023 09:57), Max: 97.5 (10 Aug 2023 09:57)  HR: 64 (10 Aug 2023 15:03) (64 - 84)  BP: 136/69 (10 Aug 2023 15:03) (112/75 - 136/69)  BP(mean): --  RR: 20 (10 Aug 2023 15:03) (18 - 20)  SpO2: 100% (10 Aug 2023 15:03) (98% - 100%)    Parameters below as of 10 Aug 2023 15:03  Patient On (Oxygen Delivery Method): room air      Review of Systems:  Review of Systems: see HPI      Allergies and Intolerances:        Allergies:  	No Known Allergies:     Home Medications: Med rec pending    Patient History:   Social History:  · Substance use	No  · Social History (marital status, living situation, occupation, and sexual history)	Patient lives with wife  Smoking status: former smoker  Drinking: denies  Drug Use: denies  "    Physical exam  General: no acute distress, sitting up in bed  HEENT: normocephalic, atraumatic, MMM  Cards: RRR, normal S1S2  Pulm: CTAB, no wheeze, crackles, rales or rhonchi  Ab: soft, nontender, nondistended, normoactive bowel sounds  Ext: wwp, no edema or calf asymmetry  Neuro: speech is fluent, follows commands, no facial asymmetry  Skin: warm and dry, no obvious rashes or lesions  
  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 Wife reports she noted L facial and slurred speech x 5-6 hours 3 days ago as well. Reports compliance with home meds.     PAST MEDICAL & SURGICAL HISTORY:  Hypertension      Psoriatic arthritis      DM (diabetes mellitus)      Cerebrovascular accident (CVA)      HARISH (obstructive sleep apnea)      Spinal stenosis      Anxiety      S/P appendectomy  1977      Baker's cyst of knee, right  1977      S/P colonoscopy with polypectomy  2013 adenoma      S/P laparoscopic cholecystectomy  feb 2014      H/O vertebral artery stenosis  R vertebral artery stenosis s/p DCA 1/25/20,      S/P CABG x 3  7/2020          FAMILY HISTORY:  Family history of CVA  Mother @ 86, Father in 70s      T(C): 36.4 (08-10-23 @ 09:57), Max: 36.4 (08-10-23 @ 09:57)  HR: 64 (08-10-23 @ 15:03) (64 - 84)  BP: 136/69 (08-10-23 @ 15:03) (112/75 - 136/69)  RR: 20 (08-10-23 @ 15:03) (18 - 20)  SpO2: 100% (08-10-23 @ 15:03) (98% - 100%)    MEDICATION RECONCILIATION   MEDICATIONS  (STANDING):    MEDICATIONS  (PRN):    Allergies    No Known Allergies    Intolerances      Vital Signs Last 24 Hrs  T(C): 36.4 (10 Aug 2023 09:57), Max: 36.4 (10 Aug 2023 09:57)  T(F): 97.5 (10 Aug 2023 09:57), Max: 97.5 (10 Aug 2023 09:57)  HR: 64 (10 Aug 2023 15:03) (64 - 84)  BP: 136/69 (10 Aug 2023 15:03) (112/75 - 136/69)  BP(mean): --  RR: 20 (10 Aug 2023 15:03) (18 - 20)  SpO2: 100% (10 Aug 2023 15:03) (98% - 100%)    Parameters below as of 10 Aug 2023 15:03  Patient On (Oxygen Delivery Method): room air (10 Aug 2023 15:45)    PAST MEDICAL & SURGICAL HISTORY:  Hypertension      Psoriatic arthritis      DM (diabetes mellitus)      Cerebrovascular accident (CVA)      HARISH (obstructive sleep apnea)      Spinal stenosis      Anxiety      S/P appendectomy  1977      Baker's cyst of knee, right  1977      S/P colonoscopy with polypectomy  2013 adenoma      S/P laparoscopic cholecystectomy  feb 2014      H/O vertebral artery stenosis  R vertebral artery stenosis s/p DCA 1/25/20,      S/P CABG x 3  7/2020        MEDICATIONS  (STANDING):  aspirin enteric coated 81 milliGRAM(s) Oral daily  atorvastatin 80 milliGRAM(s) Oral at bedtime  dextrose 5%. 1000 milliLiter(s) (50 mL/Hr) IV Continuous <Continuous>  dextrose 5%. 1000 milliLiter(s) (100 mL/Hr) IV Continuous <Continuous>  dextrose 50% Injectable 12.5 Gram(s) IV Push once  dextrose 50% Injectable 25 Gram(s) IV Push once  dextrose 50% Injectable 25 Gram(s) IV Push once  enoxaparin Injectable 40 milliGRAM(s) SubCutaneous every 24 hours  glucagon  Injectable 1 milliGRAM(s) IntraMuscular once  insulin lispro (ADMELOG) corrective regimen sliding scale   SubCutaneous three times a day before meals  insulin lispro (ADMELOG) corrective regimen sliding scale   SubCutaneous at bedtime  losartan 100 milliGRAM(s) Oral daily  ticagrelor 90 milliGRAM(s) Oral two times a day    MEDICATIONS  (PRN):  benzocaine/menthol Lozenge 1 Lozenge Oral every 2 hours PRN Sore Throat  dextrose Oral Gel 15 Gram(s) Oral once PRN Blood Glucose LESS THAN 70 milliGRAM(s)/deciliter            FAMILY HISTORY:  Family history of CVA  Mother @ 86, Father in 70s        CBC Full  -  ( 12 Aug 2023 05:30 )  WBC Count : 16.03 K/uL  RBC Count : 4.20 M/uL  Hemoglobin : 12.8 g/dL  Hematocrit : 39.0 %  Platelet Count - Automated : 301 K/uL  Mean Cell Volume : 92.9 fl  Mean Cell Hemoglobin : 30.5 pg  Mean Cell Hemoglobin Concentration : 32.8 gm/dL  Auto Neutrophil # : x  Auto Lymphocyte # : x  Auto Monocyte # : x  Auto Eosinophil # : x  Auto Basophil # : x  Auto Neutrophil % : x  Auto Lymphocyte % : x  Auto Monocyte % : x  Auto Eosinophil % : x  Auto Basophil % : x      08-12    136  |  102  |  25<H>  ----------------------------<  159<H>  4.5   |  20<L>  |  1.40<H>    Ca    9.2      12 Aug 2023 05:30  Phos  4.1     08-12  Mg     1.9     08-12        Urinalysis Basic - ( 12 Aug 2023 05:30 )    Color: x / Appearance: x / SG: x / pH: x  Gluc: 159 mg/dL / Ketone: x  / Bili: x / Urobili: x   Blood: x / Protein: x / Nitrite: x   Leuk Esterase: x / RBC: x / WBC x   Sq Epi: x / Non Sq Epi: x / Bacteria: x        Radiology :     < from: MR Head No Cont (08.11.23 @ 18:58) >  ACC: [id number] EXAM:  MR BRAIN   ORDERED BY: RACHEL KONG     PROCEDURE DATE:  08/11/2023          INTERPRETATION:  PROCEDURE: MRI Brain without contrast    INDICATION: Left sided numbness and slurred speech. Hypertension and   prior CVA    TECHNIQUE: Abbreviated MRI brain is obtained to assess for infarct,   including axial DWI and T2-FLAIR series.    COMPARISON: MRA 12/27/2019, CT head from 08/10/2023    FINDINGS:    Diffusion imaging is positive for recent infarct in the right corona   radiata as suspected on CT. There is corresponding signal intensity on   the T2 FLAIR images.    Elsewhere there are unchanged findings of mild small vessel ischemic   change in the cerebral white matter and further involution of chronic   left cerebellar infarct since 12/2019. No hydrocephalus. There is mild   progression of volume loss since then.      IMPRESSION:    Recent infarct DWI+ at right corona radiata       Review of Systems : per HPI               Vital Signs Last 24 Hrs  T(C): 36.3 (12 Aug 2023 05:40), Max: 37 (11 Aug 2023 17:48)  T(F): 97.4 (12 Aug 2023 05:40), Max: 98.6 (11 Aug 2023 17:48)  HR: 70 (12 Aug 2023 04:35) (57 - 75)  BP: 117/63 (12 Aug 2023 04:35) (117/63 - 197/91)  BP(mean): 85 (12 Aug 2023 04:35) (84 - 130)  RR: 22 (12 Aug 2023 04:35) (17 - 23)  SpO2: 98% (12 Aug 2023 04:35) (96% - 98%)    Parameters below as of 12 Aug 2023 04:35  Patient On (Oxygen Delivery Method): room air            Physical Exam :  78 y o man lying comfortably in semi Rios's position , awake , alert , no acute complaints     Head : normocephalic , atraumatic    Eyes : PERRLA , EOMI , no nystagmus , sclera anicteric    ENT / FACE : neg nasal discharge , uvula midline , no oropharyngeal erythema / exudate    Neck : supple , negative JVD , negative carotid bruits , no thyromegaly    Chest : CTA bilaterally , neg wheeze / rhonchi / rales / crackles / egophany    Cardiovascular : regular rate and rhythm , neg murmurs / rubs / gallops    Abdomen : soft , non distended , non tender to palpation in all 4 quadrants ,  normal bowel sounds     Extremities : WWP , neg cyanosis /clubbing / edema     Neurologic Exam :    Alert and oriented to person , place , date/year , speech fluent w/o dysarthria , follows commands , recent and remote memory intact , repetition intact , comprehension intact ,  attention/concentration intact , fund of knowledge appropriate    Cranial Nerves:     II :                         pupils equal , round and reactive to light , visual fields intact   III/ IV/VI :              extraocular movements intact , neg nystagmus , neg ptosis  V :                        facial sensation intact , V1-3 normal  VII :                      mild L NLFF , normal eye closure and smile  VIII :                     hearing intact to finger rub bilaterally  IX and X :             no hoarseness , gag intact , palate/ uvula rise symmetrically  XI :                       SCM / trapezius strength intact bilateral  XII :                      no tongue deviation    Motor Exam:          Right UE:                5/5 :     5/5 :   wrist extensors/ flexors  5/5 :   biceps  5/5 :   triceps  5/5 :   deltoid    negative pronator drift    Left UE:                  5/5 :     5/5 :   wrist extensors/ flexors  5/5 :   biceps  5/5 :   triceps  5/5 :   deltoid    negative pronator drift        Right LE:     5/5 : dorsiflexors   5/5 : plantar flexors  5/5 : quadriceps  5/5 : hamstrings  5/5 : hip flexors      Left LE:     5/5 : dorsiflexors   5/5 : plantar flexors  5/5 : quadriceps  5/5 : hamstrings  5/5 : hip flexors      Sensation :         intact to light touch x 4 extremities                            no neglect or extinction on double simultaneous testing      DTR :     biceps/brachioradialis : equal                      patella/ankle : equal     neg Babinski       Coordination :      Finger to Nose :  L dysmetria        Gait :  not tested          PM&R Impression :     admitted for c/o LUE and LLE weakness x 1.5 weeks and transient L facial and dysarthria 3 days PTA     -  MRI revealed recent R corona radiata infarct         Recommendations / Plan :           1) Physical / Occupational therapy focusing on therapeutic exercises , equipment evaluation , bed mobility/transfer out of bed evaluation , progressive ambulation with assistive devices prn .    2) Current disposition plan recommendation  :    pending functional progress

## 2023-08-16 DIAGNOSIS — L40.50 ARTHROPATHIC PSORIASIS, UNSPECIFIED: ICD-10-CM

## 2023-08-16 DIAGNOSIS — F41.9 ANXIETY DISORDER, UNSPECIFIED: ICD-10-CM

## 2023-08-16 DIAGNOSIS — Z95.1 PRESENCE OF AORTOCORONARY BYPASS GRAFT: ICD-10-CM

## 2023-08-16 DIAGNOSIS — Z85.51 PERSONAL HISTORY OF MALIGNANT NEOPLASM OF BLADDER: ICD-10-CM

## 2023-08-16 DIAGNOSIS — R29.703 NIHSS SCORE 3: ICD-10-CM

## 2023-08-16 DIAGNOSIS — Z79.02 LONG TERM (CURRENT) USE OF ANTITHROMBOTICS/ANTIPLATELETS: ICD-10-CM

## 2023-08-16 DIAGNOSIS — I65.03 OCCLUSION AND STENOSIS OF BILATERAL VERTEBRAL ARTERIES: ICD-10-CM

## 2023-08-16 DIAGNOSIS — I69.354 HEMIPLEGIA AND HEMIPARESIS FOLLOWING CEREBRAL INFARCTION AFFECTING LEFT NON-DOMINANT SIDE: ICD-10-CM

## 2023-08-16 DIAGNOSIS — I25.10 ATHEROSCLEROTIC HEART DISEASE OF NATIVE CORONARY ARTERY WITHOUT ANGINA PECTORIS: ICD-10-CM

## 2023-08-16 DIAGNOSIS — G47.33 OBSTRUCTIVE SLEEP APNEA (ADULT) (PEDIATRIC): ICD-10-CM

## 2023-08-16 DIAGNOSIS — E11.51 TYPE 2 DIABETES MELLITUS WITH DIABETIC PERIPHERAL ANGIOPATHY WITHOUT GANGRENE: ICD-10-CM

## 2023-08-16 DIAGNOSIS — R20.0 ANESTHESIA OF SKIN: ICD-10-CM

## 2023-08-16 DIAGNOSIS — E86.0 DEHYDRATION: ICD-10-CM

## 2023-08-16 DIAGNOSIS — Z79.82 LONG TERM (CURRENT) USE OF ASPIRIN: ICD-10-CM

## 2023-08-16 DIAGNOSIS — I63.81 OTHER CEREBRAL INFARCTION DUE TO OCCLUSION OR STENOSIS OF SMALL ARTERY: ICD-10-CM

## 2023-08-16 DIAGNOSIS — Z79.84 LONG TERM (CURRENT) USE OF ORAL HYPOGLYCEMIC DRUGS: ICD-10-CM

## 2023-08-16 DIAGNOSIS — E78.5 HYPERLIPIDEMIA, UNSPECIFIED: ICD-10-CM

## 2023-08-28 ENCOUNTER — APPOINTMENT (OUTPATIENT)
Dept: HEART AND VASCULAR | Facility: CLINIC | Age: 78
End: 2023-08-28
Payer: MEDICARE

## 2023-08-28 VITALS
SYSTOLIC BLOOD PRESSURE: 155 MMHG | HEIGHT: 71 IN | DIASTOLIC BLOOD PRESSURE: 81 MMHG | TEMPERATURE: 98.1 F | WEIGHT: 212 LBS | HEART RATE: 83 BPM | BODY MASS INDEX: 29.68 KG/M2 | OXYGEN SATURATION: 98 %

## 2023-08-28 PROCEDURE — 93000 ELECTROCARDIOGRAM COMPLETE: CPT

## 2023-08-28 PROCEDURE — 99215 OFFICE O/P EST HI 40 MIN: CPT

## 2023-08-28 RX ORDER — CLOPIDOGREL BISULFATE 75 MG/1
75 TABLET, FILM COATED ORAL DAILY
Refills: 0 | Status: DISCONTINUED | COMMUNITY
End: 2023-08-28

## 2023-08-28 RX ORDER — PREDNISONE 5 MG/1
5 TABLET ORAL
Qty: 30 | Refills: 0 | Status: DISCONTINUED | COMMUNITY
Start: 2021-01-14 | End: 2023-08-28

## 2023-08-28 RX ORDER — MULTIVIT-MIN/FOLIC/VIT K/LYCOP 400-300MCG
500 TABLET ORAL DAILY
Qty: 30 | Refills: 3 | Status: DISCONTINUED | COMMUNITY
End: 2023-08-28

## 2023-08-28 RX ORDER — TICAGRELOR 60 MG/1
TABLET ORAL
Refills: 0 | Status: DISCONTINUED | COMMUNITY

## 2023-08-28 RX ORDER — AMLODIPINE BESYLATE 5 MG/1
5 TABLET ORAL DAILY
Qty: 90 | Refills: 3 | Status: ACTIVE | COMMUNITY
Start: 2023-08-28 | End: 1900-01-01

## 2023-08-28 RX ORDER — ACETAMINOPHEN 325 MG/1
325 TABLET ORAL EVERY 6 HOURS
Refills: 0 | Status: DISCONTINUED | COMMUNITY
Start: 2020-07-13 | End: 2023-08-28

## 2023-08-28 RX ORDER — METOPROLOL TARTRATE 25 MG/1
25 TABLET, FILM COATED ORAL
Refills: 0 | Status: DISCONTINUED | COMMUNITY
Start: 2020-07-13 | End: 2023-08-28

## 2023-08-28 RX ORDER — POLYETHYLENE GLYCOL 3350 17 G/17G
17 POWDER, FOR SOLUTION ORAL
Qty: 238 | Refills: 0 | Status: DISCONTINUED | COMMUNITY
Start: 2020-07-10 | End: 2023-08-28

## 2023-08-28 NOTE — DISCUSSION/SUMMARY
[FreeTextEntry1] : 78 year-old male w/ PMH of HTN, HARISH (not using CPAP), psoriatic arthritis, s/p PICA stroke in 11/2019, right vertebral artery stenosis and s/p DCA (01/25/2020), severe aortic arch plaque, CAD s/p 3v CABG 07/2020, moderate LVH, OA R hip, lumbar disc disease, who presents today for a follow up visit.  #ASCVD risk optimization  #CAD s/p 3v CABG  #S/p stroke x2 (most recent one in 2023) #HLD - he is stable in terms of CAD  - continue ASA and Bralinta for now (until August 2023 patient was on Plavix, changed to Brilinta post stroke) - continue Lipitor 80 mg daily  - LDL goal <55 given the multiple atherosclerotic events (strokex2 and CABG) - advised patient to do lifestyle modifications as believe - also advised patient to do physical therapy post stroke for better mobility and balance- although patient states that he prefers to work on it at home by himself - f/u further evaluation of stroke etiology with event monitor for 30 days - f/u further evaluation of CAD with stress echo   #HTN  - BP in office noted to be elevated to 150s/90s - advised patient to be mindful of salt intake - also advised patient to keep a BP log at home, to further tailor treatments  - START amlodipine 5 mg oral daily (he was previously on it in 2019 - no symptoms of leg edema reported)  - advised patient to continue with losartan 100 mg oral daily   #DM  - continue Metformin   #Lifestyle - Encouraged patient to continue healthy exercise and eating habits, focusing on a Mediterranean style of eating and aiming for the recommended walking daily - Dietary and exercise habits reviewed and discussed cardiovascular disease, the optimization of risk factors and lifestyle strategies that can be used to achieve this.  Follow up with Dr Rush in 2 months.  [EKG obtained to assist in diagnosis and management of assessed problem(s)] : EKG obtained to assist in diagnosis and management of assessed problem(s)

## 2023-08-28 NOTE — HISTORY OF PRESENT ILLNESS
[FreeTextEntry1] : 78 year-old male w/ PMH of HTN, HARISH (not using CPAP), psoriatic arthritis, s/p PICA stroke in 11/2019, right vertebral artery stenosis and s/p DCA (01/25/2020), severe aortic arch plaque, CAD s/p 3v CABG 07/2020, moderate LVH, OA R hip, lumbar disc disease, who presents today for a follow up visit. Patient was recently admitted to [facility] with imaging shown as below. During this hospitalization the clopidogrel was changed to Brilinta. Currently he is on ASA+Brillinta. TTE/EULALIO with LVH, without thrombus.  MRI confirmed recent infarct at right corona radiata. He states that, post CVA, he feels fatigued and tired.   No chest pain, SOB, palpitations, nausea, vomiting, PND, or orthopnea reported.   The following work up was done in the hospital in August 2023:  - CT Head: New right corona radiata lacunar infarct, possibly source of symptoms. No acute hemorrhage. - MR Head Non-Contrast: Recent infarct DWI+ at right corona radiata - CT Angio Head: No large vessel occlusion. Interval smaller caliber of the vertebrobasilar system favored to be adaptive narrowing from now bilateral vertebral artery occlusion in the neck. No significant anterior circulation steno-occlusive disease, with mild-moderate scattered stenoses grossly unchanged. - CT Angio Neck: Bilateral vertebral arteries are occluded (right V1 and left V2) segments, but chronic and with additional adaptive narrowing since 11/2019. Both sides reconstitute via cervical collaterals and/or retrograde flow on right.  - Echo: Normal LV size, moderate LVH. Normal LV EF. Aortic sclerosis w/o significant stenosis. Mild aortic regurg. No PFO. - EULALIO: LV EF 60%. Mild symmetric LVH. No thrombus seen. No intracardiac shunt. - A1C with Estimated Average Glucose Result: 6.1 - LDL Cholesterol Calculated: 47 mg/dL, , , HDL 35 - Thyroid Stimulating Hormone, Serum: 2.970 uIU/mL  Also, as per hospital notes, he has recently undergone a bladder surgery and has had a bladder mass excised; was told the mass was benign.   Prior history- The pt was initially referred here by Dr. Dr. Us for LE edema. The pt underwent CT coronary angiogram on 6/26/2020, which showed coronary artery calcium score 1637; LM - borderline significant proximal stenosis due to noncalcific plaque and three vessel disease. The pt subsequently underwent CABG x 3 on 7/6/2020. The CABG was non-complicated. Cr increased to 1.35 post-op (baseline 1.1) and Cr has returned back to normal (1.17).   CABG- 7/6/2020- ORTIZ-LAD, RSVG to OM1 and to PL RCA 7/- , 1/10/20- A1C 6.5, chol 111, HDL 41, LDL 40, trig 150 6/16/20- Echo 1. Left ventricular ejection fraction, by visual estimation, is 66%. 2. Mildly increased LV wall thickness. 3. Normal right ventricular size and function. 4. Mild aortic regurgitation. 5. There is no evidence of pericardial effusion. 10/16/2020- IBRAHIMA- Mild-moderately reduced resting IBRAHIMA's on right with elevated IBRAHIMA's on left. Likely noncompressible vessels on left; Moderate PAD at rest on right- involving fem-pop and tibial

## 2023-08-28 NOTE — PHYSICAL EXAM
[General Appearance - Well Nourished] : well nourished [Normal Jugular Venous V Waves Present] : normal jugular venous V waves present [Normal Oral Mucosa] : normal oral mucosa [Respiration, Rhythm And Depth] : normal respiratory rhythm and effort [Chest Palpation] : palpation of the chest revealed no abnormalities [Auscultation Breath Sounds / Voice Sounds] : lungs were clear to auscultation bilaterally [Lungs Percussion] : the lungs were normal to percussion [Heart Sounds] : normal S1 and S2 [Arterial Pulses Normal] : the arterial pulses were normal [Edema] : no peripheral edema present [Veins - Varicosity Changes] : no varicosital changes were noted in the lower extremities [Bowel Sounds] : normal bowel sounds [Abdomen Soft] : soft [Abdomen Tenderness] : non-tender [Abdomen Mass (___ Cm)] : no abdominal mass palpated [Abnormal Walk] : normal gait [Cyanosis, Localized] : no localized cyanosis [] : no rash [Skin Turgor] : normal skin turgor [Oriented To Time, Place, And Person] : oriented to person, place, and time [Affect] : the affect was normal [Impaired Insight] : insight and judgment were intact [No Anxiety] : not feeling anxious [Well Developed] : well developed [Well Nourished] : well nourished [No Acute Distress] : no acute distress [Normal Conjunctiva] : normal conjunctiva [Normal Venous Pressure] : normal venous pressure [No Carotid Bruit] : no carotid bruit [Normal S1, S2] : normal S1, S2 [No Murmur] : no murmur [No Rub] : no rub [No Gallop] : no gallop [Clear Lung Fields] : clear lung fields [Good Air Entry] : good air entry [No Respiratory Distress] : no respiratory distress  [Soft] : abdomen soft [Non Tender] : non-tender [No Masses/organomegaly] : no masses/organomegaly [Normal Bowel Sounds] : normal bowel sounds [Normal Gait] : normal gait [No Edema] : no edema [No Cyanosis] : no cyanosis [No Clubbing] : no clubbing [No Varicosities] : no varicosities [No Rash] : no rash [No Skin Lesions] : no skin lesions [Moves all extremities] : moves all extremities [No Focal Deficits] : no focal deficits [Normal Speech] : normal speech [Alert and Oriented] : alert and oriented [Normal memory] : normal memory

## 2023-09-01 ENCOUNTER — APPOINTMENT (OUTPATIENT)
Dept: NEUROLOGY | Facility: CLINIC | Age: 78
End: 2023-09-01
Payer: MEDICARE

## 2023-09-01 ENCOUNTER — NON-APPOINTMENT (OUTPATIENT)
Age: 78
End: 2023-09-01

## 2023-09-01 VITALS
SYSTOLIC BLOOD PRESSURE: 156 MMHG | WEIGHT: 207 LBS | HEART RATE: 95 BPM | BODY MASS INDEX: 28.98 KG/M2 | TEMPERATURE: 97.2 F | DIASTOLIC BLOOD PRESSURE: 89 MMHG | OXYGEN SATURATION: 97 % | HEIGHT: 71 IN

## 2023-09-01 PROCEDURE — 99213 OFFICE O/P EST LOW 20 MIN: CPT

## 2023-09-01 PROCEDURE — 99203 OFFICE O/P NEW LOW 30 MIN: CPT

## 2023-09-01 NOTE — ASSESSMENT
[FreeTextEntry1] : Patient is a 78 year male with PMH of HTN, HLD, T2DM, L cerebellar stroke in 2019 (h/o vertebrobasilar disease, on aspirin/Plavix), CAD s/p CABG, and bladder cancer s/p resection presenting as a post hospitalization follow up for right corona radiata ischemic stroke.  Plan - Continue aspirin and brilinta (try to get insurance to cover it) - continue atorvastatin 80mg - 30 day cardiac monitor (educated patient on benefits of ILR but patient would prefer 30 day cardiac montior as he doesn't want anything to be implanted) - Recommended speech therapy and physical therapy; patient is not interested at this time - Repeat labs 3 months - F/U 2 months

## 2023-09-01 NOTE — HISTORY OF PRESENT ILLNESS
[FreeTextEntry1] : Patient is a 78 year male with PMH of HTN, HLD, T2DM, L cerebellar stroke in 2019 (h/o vertebrobasilar disease, on aspirin/Plavix), CAD s/p CABG, and bladder cancer s/p resection presenting as a post hospitalization follow up for right corona radiata ischemic stroke. Patient was recently admitted to the hospital on 8/10/23 with LUE and LLE weakness x 1.5 weeks and transient L facial and dysarthria 3 days ago.   Hospital Labs: A1C 6.1, LDL 47, TSH 2.970 Hospital Imaging: CT Head: New right corona radiata lacunar infarct, possibly source of symptoms. No acute hemorrhage. MR Head Non Contrast: Recent infarct DWI+ at right corona radiata CT Angio Head: No large vessel occlusion. Interval smaller caliber of the vertebrobasilar system favored to be adaptive narrowing from now bilateral vertebral artery occlusion in the neck. No significant anterior circulation steno-occlusive disease, with mild-moderate scattered stenoses grossly unchanged. CT Angio Neck: Bilateral vertebral arteries are occluded (right V1 and left V2) segments, but chronic and with additional adaptive narrowing since 11/2019. Both sides reconstitute via cervical collaterals and/or retrograde flow on right. TTE: Normal LV size, moderate LVH. Normal LV EF. Aortic sclerosis w/o significant stenosis. Mild aortic regurg. No PFO. EULALIO:  LV EF 60%. Mild symmetric LVH. No thrombus seen. No intracardiac shunt.   Patient was discharged on Aspirin 81mg and Brilinta 90mg BID as patient was previously on Plavix. Also discharged on Atorvastatin 80mg. Patient was recommended ILR placement outpatient.   Since discharge, patient denies any new stroke like symptoms and tolerating their daily medications without any bleeding or muscle cramps.  He needs a preapproval for the Brilinta since he can't afford it, it's $300 per month. The cardiologist ordered for a 30 day heart monitor as patient doesn't want to have the ILR. He is supposed to be doing a stress test in 2 months because he gets short of breath on exertion. Sleep: used to snore when he sleeps. Previously dx with sleep apnea. Not on CPAP. BP today: 156/89. Patient taking Losartan and Amlodipine Diet: vegetables, fruits, yogurt, chicken, fish, red meat rarely, turkey burger, portabello mushroom burger, soups Exercise: Doesn't exercise. He has plans to start going to the gym. Not interested in physical therapy.

## 2023-09-01 NOTE — PHYSICAL EXAM
[FreeTextEntry1] : The patient is alert and oriented x3, follows commands, and is able to participate fully in the history taking. Speech is normal with mild dysarthria. Memory is intact: Immediate recall 3 out of 3, short-term 3 out of 3, remote memory intact. Cranial nerves II through XII intact. Left nasolabial fold flattening Motor exam: Upper and lower extremities 5/5, normal tone. No abnormal movements noted. Sensory exam: Intact to light touch and pinprick. Romberg negative. Coordination and vestibular exam: Finger to nose intact, no evidence of ataxia or nystagmus. Satellating around the left. Gait: Normal stance and gait. Reflexes: One to 2+ in upper and lower extremities. No pathological reflexes. Downgoing toes.

## 2023-10-23 ENCOUNTER — APPOINTMENT (OUTPATIENT)
Dept: HEART AND VASCULAR | Facility: CLINIC | Age: 78
End: 2023-10-23
Payer: MEDICARE

## 2023-10-23 ENCOUNTER — MED ADMIN CHARGE (OUTPATIENT)
Age: 78
End: 2023-10-23

## 2023-10-23 VITALS
HEIGHT: 71 IN | RESPIRATION RATE: 14 BRPM | SYSTOLIC BLOOD PRESSURE: 130 MMHG | DIASTOLIC BLOOD PRESSURE: 60 MMHG | BODY MASS INDEX: 28.7 KG/M2 | HEART RATE: 81 BPM | WEIGHT: 205 LBS

## 2023-10-23 DIAGNOSIS — E66.9 OBESITY, UNSPECIFIED: ICD-10-CM

## 2023-10-23 DIAGNOSIS — G47.33 OBSTRUCTIVE SLEEP APNEA (ADULT) (PEDIATRIC): ICD-10-CM

## 2023-10-23 DIAGNOSIS — Z23 ENCOUNTER FOR IMMUNIZATION: ICD-10-CM

## 2023-10-23 DIAGNOSIS — I73.9 PERIPHERAL VASCULAR DISEASE, UNSPECIFIED: ICD-10-CM

## 2023-10-23 PROCEDURE — 93351 STRESS TTE COMPLETE: CPT

## 2023-10-23 PROCEDURE — 99214 OFFICE O/P EST MOD 30 MIN: CPT

## 2023-10-26 PROBLEM — I73.9 PAD (PERIPHERAL ARTERY DISEASE): Status: ACTIVE | Noted: 2020-10-16

## 2023-10-26 PROBLEM — E66.9 OBESITY, CLASS I, BMI 30-34.9: Status: ACTIVE | Noted: 2017-07-19

## 2023-11-14 ENCOUNTER — APPOINTMENT (OUTPATIENT)
Dept: HEART AND VASCULAR | Facility: CLINIC | Age: 78
End: 2023-11-14

## 2023-12-15 NOTE — PATIENT PROFILE ADULT - DISASTER - NSPRESCRALCFREQ_GEN_A_NUR
KIRK stating she is having sinus drainage and a cough; has tried claritin and flonase but those aren't helping. Wonders what JOJ suggest she try next?   Monthly or less

## 2023-12-22 ENCOUNTER — APPOINTMENT (OUTPATIENT)
Dept: HEART AND VASCULAR | Facility: CLINIC | Age: 78
End: 2023-12-22
Payer: MEDICARE

## 2023-12-22 VITALS
OXYGEN SATURATION: 95 % | WEIGHT: 206 LBS | DIASTOLIC BLOOD PRESSURE: 77 MMHG | BODY MASS INDEX: 28.84 KG/M2 | TEMPERATURE: 97.3 F | SYSTOLIC BLOOD PRESSURE: 136 MMHG | HEIGHT: 71 IN | HEART RATE: 90 BPM

## 2023-12-22 DIAGNOSIS — I25.10 ATHEROSCLEROTIC HEART DISEASE OF NATIVE CORONARY ARTERY W/OUT ANGINA PECTORIS: ICD-10-CM

## 2023-12-22 DIAGNOSIS — E11.9 TYPE 2 DIABETES MELLITUS W/OUT COMPLICATIONS: ICD-10-CM

## 2023-12-22 DIAGNOSIS — I10 ESSENTIAL (PRIMARY) HYPERTENSION: ICD-10-CM

## 2023-12-22 PROCEDURE — 99214 OFFICE O/P EST MOD 30 MIN: CPT | Mod: 25

## 2023-12-22 PROCEDURE — 93000 ELECTROCARDIOGRAM COMPLETE: CPT

## 2023-12-22 PROCEDURE — 36415 COLL VENOUS BLD VENIPUNCTURE: CPT

## 2023-12-22 RX ORDER — METHOTREXATE 2.5 MG/1
TABLET ORAL
Refills: 0 | Status: ACTIVE | COMMUNITY

## 2023-12-22 RX ORDER — METHOTREXATE SODIUM/PF 20 MG
20 VIAL (EA) INJECTION
Refills: 0 | Status: DISCONTINUED | COMMUNITY
End: 2023-12-22

## 2023-12-22 RX ORDER — METFORMIN HYDROCHLORIDE 500 MG/1
500 TABLET, COATED ORAL TWICE DAILY
Refills: 0 | Status: ACTIVE | COMMUNITY

## 2023-12-22 NOTE — DISCUSSION/SUMMARY
[FreeTextEntry1] : 78 year-old male w/ PMH of HTN, HARISH (not using CPAP), psoriatic arthritis, s/p PICA stroke in 11/2019, right vertebral artery stenosis and s/p DCA (01/25/2020), severe aortic arch plaque, CAD s/p 3v CABG 07/2020, moderate LVH, OA R hip, lumbar disc disease, who presents today for a follow up visit.  #ASCVD risk optimization  #CAD s/p 3v CABG  #S/p stroke x2 (most recent one in 2023) #HLD -monitor for 14 days earlier this month (pt unable to wear for 30): no afib,1 run of NSVT (4 beats), short runs of SVT - he is stable in terms of CAD  - continue ASA and Brilinta for now (until August 2023 patient was on Plavix, changed to Brilinta post stroke) -Will consider brillinta alone- but will discuss with neuro team. Pt is happy to continue w/ both.  - continue Lipitor 80 mg daily  - LDL goal <55 given the multiple atherosclerotic events (stroke x2 and CABG); will recheck lipids today   #HTN  - BP in office now WNL - Continue losartan and amlodipine - moderate LVH seen on most recent echo 8/23  - will check CMP today as we were unable to obtain any recent blood work from PCP   #DM  - continue Metformin 500mg BID  - probably would benefit from other meds, but will await labs (checking A1c today; reports most recent was 6.3%)   #Lifestyle - Encouraged patient to continue healthy exercise and eating habits, focusing on a Mediterranean style of eating and aiming for the recommended walking daily - Dietary and exercise habits reviewed and discussed cardiovascular disease, the optimization of risk factors and lifestyle strategies that can be used to achieve this.  RTC in 4-6 months.  [EKG obtained to assist in diagnosis and management of assessed problem(s)] : EKG obtained to assist in diagnosis and management of assessed problem(s)

## 2023-12-22 NOTE — PHYSICAL EXAM
[Well Developed] : well developed [Well Nourished] : well nourished [No Acute Distress] : no acute distress [Normal Conjunctiva] : normal conjunctiva [Normal Venous Pressure] : normal venous pressure [No Carotid Bruit] : no carotid bruit [Normal S1, S2] : normal S1, S2 [No Murmur] : no murmur [No Rub] : no rub [No Gallop] : no gallop [Clear Lung Fields] : clear lung fields [Good Air Entry] : good air entry [No Respiratory Distress] : no respiratory distress  [No Edema] : no edema [No Cyanosis] : no cyanosis [No Clubbing] : no clubbing [No Varicosities] : no varicosities [No Rash] : no rash [No Skin Lesions] : no skin lesions [Normal Gait] : normal gait

## 2023-12-22 NOTE — HISTORY OF PRESENT ILLNESS
[FreeTextEntry1] : 78 year-old male w/ PMH of HTN, HARISH (still not using CPAP), psoriatic arthritis, s/p PICA stroke in 11/2019, right vertebral artery stenosis and s/p DCA (01/25/2020), severe aortic arch plaque, CAD s/p 3v CABG 07/2020, moderate LVH, OA R hip, lumbar disc disease, who presents today for a follow up visit.   He reports feeling well today. Patient denies any chest pain, SOB, palpitations, orthopnea, PND or LE edema. He continues to report back pain that limits his ability to be active and endorses indulging in more sweets and having a poor diet over the holiday season.  Was asking about using Dr. Rush or myself for PCP; advised we are specialist/cardiology only. He will continue to follow with Dr. Tr Bello.   From previous visit:  Patient was recently admitted to [facility] with imaging shown as below. During this hospitalization the clopidogrel was changed to Brilinta. Currently he is on ASA+Brillinta. TTE/EULALIO with LVH, without thrombus.  MRI confirmed recent infarct at right corona radiata. He states that, post CVA, he feels fatigued and tired.    The following work up was done in the hospital in August 2023:  - CT Head: New right corona radiata lacunar infarct, possibly source of symptoms. No acute hemorrhage. - MR Head Non-Contrast: Recent infarct DWI+ at right corona radiata - CT Angio Head: No large vessel occlusion. Interval smaller caliber of the vertebrobasilar system favored to be adaptive narrowing from now bilateral vertebral artery occlusion in the neck. No significant anterior circulation steno-occlusive disease, with mild-moderate scattered stenoses grossly unchanged. - CT Angio Neck: Bilateral vertebral arteries are occluded (right V1 and left V2) segments, but chronic and with additional adaptive narrowing since 11/2019. Both sides reconstitute via cervical collaterals and/or retrograde flow on right.  - Echo: Normal LV size, moderate LVH. Normal LV EF. Aortic sclerosis w/o significant stenosis. Mild aortic regurg. No PFO. - EULALIO: LV EF 60%. Mild symmetric LVH. No thrombus seen. No intracardiac shunt. - A1C with Estimated Average Glucose Result: 6.1 (reports diagnosed w/ DMII about 4-5 years ago)  - LDL Cholesterol Calculated: 47 mg/dL, , , HDL 35 - Thyroid Stimulating Hormone, Serum: 2.970 uIU/mL  Also, as per hospital notes, he has recently undergone a bladder surgery and has had a bladder mass excised; was told the mass was benign.   Prior history- The pt was initially referred here by Dr. Dr. Us for LE edema. The pt underwent CT coronary angiogram on 6/26/2020, which showed coronary artery calcium score 1637; LM - borderline significant proximal stenosis due to noncalcific plaque and three vessel disease. The pt subsequently underwent CABG x 3 on 7/6/2020. The CABG was non-complicated. Cr increased to 1.35 post-op (baseline 1.1) and Cr has returned back to normal (1.17).   CABG- 7/6/2020- ORTIZ-LAD, RSVG to OM1 and to PL RCA 7/- , 1/10/20- A1C 6.5, chol 111, HDL 41, LDL 40, trig 150 6/16/20- Echo 1. Left ventricular ejection fraction, by visual estimation, is 66%. 2. Mildly increased LV wall thickness. 3. Normal right ventricular size and function. 4. Mild aortic regurgitation. 5. There is no evidence of pericardial effusion. 10/16/2020- IBRAHIMA- Mild-moderately reduced resting IBRAHIMA's on right with elevated IBRAHIMA's on left. Likely noncompressible vessels on left; Moderate PAD at rest on right- involving fem-pop and tibial

## 2023-12-26 LAB
ALBUMIN SERPL ELPH-MCNC: 4.4 G/DL
ALP BLD-CCNC: 103 U/L
ALT SERPL-CCNC: 97 U/L
ANION GAP SERPL CALC-SCNC: 14 MMOL/L
AST SERPL-CCNC: 72 U/L
BILIRUB SERPL-MCNC: 0.5 MG/DL
BUN SERPL-MCNC: 25 MG/DL
CALCIUM SERPL-MCNC: 9.6 MG/DL
CHLORIDE SERPL-SCNC: 105 MMOL/L
CHOLEST SERPL-MCNC: 120 MG/DL
CO2 SERPL-SCNC: 20 MMOL/L
CREAT SERPL-MCNC: 1.84 MG/DL
EGFR: 38 ML/MIN/1.73M2
ESTIMATED AVERAGE GLUCOSE: 120 MG/DL
GLUCOSE SERPL-MCNC: 116 MG/DL
HBA1C MFR BLD HPLC: 5.8 %
HDLC SERPL-MCNC: 43 MG/DL
LDLC SERPL CALC-MCNC: 52 MG/DL
NONHDLC SERPL-MCNC: 77 MG/DL
POTASSIUM SERPL-SCNC: 5.1 MMOL/L
PROT SERPL-MCNC: 7 G/DL
SODIUM SERPL-SCNC: 139 MMOL/L
TRIGL SERPL-MCNC: 150 MG/DL

## 2024-01-10 RX ORDER — TICAGRELOR 90 MG/1
90 TABLET ORAL TWICE DAILY
Qty: 60 | Refills: 3 | Status: ACTIVE | COMMUNITY
Start: 2023-09-07 | End: 1900-01-01

## 2024-01-23 ENCOUNTER — APPOINTMENT (OUTPATIENT)
Dept: NEUROLOGY | Facility: CLINIC | Age: 79
End: 2024-01-23
Payer: MEDICARE

## 2024-01-23 DIAGNOSIS — I63.9 CEREBRAL INFARCTION, UNSPECIFIED: ICD-10-CM

## 2024-01-23 PROCEDURE — 99215 OFFICE O/P EST HI 40 MIN: CPT | Mod: 95

## 2024-01-23 RX ORDER — CLOPIDOGREL BISULFATE 75 MG/1
75 TABLET, FILM COATED ORAL DAILY
Qty: 90 | Refills: 3 | Status: ACTIVE | COMMUNITY
Start: 2024-01-11 | End: 1900-01-01

## 2024-01-23 NOTE — HISTORY OF PRESENT ILLNESS
[FreeTextEntry1] : Demetrio Gasca is a 79 year old male with PMH of HTN, HLD, T2DM, L cerebellar stroke in 2019 (in setting of vertebrobasilar disease, on Aspirin/Plavix), right vertebral artery stenosis s/p DCA (2020), CAD s/p CABG (2020), HARISH (not using CPAP), psoriatic arthritis, bladder CA s/p resection, severe aortic plaque and moderate LVH who presents for follow up after new right CR ischemic stroke in 8/2023.  Since last visit, patient reports no new stroke-like symptoms. He is tolerating his Aspirin, Plavix and Atorvastatin 80 mg without bruising, bleeding or myalgias. He is not able to afford the $300 Brilinta copayment. Lab work from 12/2023 includes LDL 52 and A1c 5.8%. 14 day monitor without any episode of A fib. BP sometimes elevated, was recently started on Amlodipine. He has recently lost some weight and is trying to cut out sweets. He reports no regular exercise but did physical therapy after his back surgery. He reports no being interested in ILR placement. Denies any chest pain or palpitations. He reports not using his CPAP machine. He saw a psychiatrist who gave him Xanax and Zolpidem which has improved is sleep and snoring.

## 2024-01-23 NOTE — PHYSICAL EXAM
[FreeTextEntry1] : Exam is limited due to the nature of the telehealth visit. The patient is alert and oriented to conversation. Speech and language are intact. Cranial nerves are grossly intact. He is able to move all extremities.

## 2024-01-23 NOTE — ASSESSMENT
[FreeTextEntry1] : Demetrio Gasca is a 79 year old male with PMH of HTN, HLD, T2DM, L cerebellar stroke in 2019 (in setting of vertebrobasilar disease, on Aspirin/Plavix), right vertebral artery stenosis s/p DCA (2020), CAD s/p CABG (2020), HARISH (not using CPAP), psoriatic arthritis, bladder CA s/p resection, severe aortic plaque and moderate LVH who presents for follow up after new right CR ischemic stroke in 8/2023.   Plan: -Continue Aspirin and Plavix for secondary stroke prevention -Continue Atorvastatin 80 mg, LDL goal <70 -Continue BP management with PCP, BP goal <140/90 -Goal of walking 15 minutes daily for exercise -Plan for MRA H/N NOVA within the next 6 months to assess vascular stenosis -Counselled on signs of stroke BEFAST and to call 911 with any new or worsening neurological symptoms -RTO in 4 months or after imaging has been completed

## 2024-01-23 NOTE — REASON FOR VISIT
[Home] : at home, [unfilled] , at the time of the visit. [Medical Office: ([facility])___] : at the medical office located in  [Patient] : the patient [Self] : self [Follow-Up: _____] : a [unfilled] follow-up visit [This encounter was initiated by telehealth (audio with video) and converted to telephone (audio only) due to technical difficulties.] : This encounter was initiated by telehealth (audio with video) and converted to telephone (audio only) due to technical difficulties.

## 2024-01-23 NOTE — REASON FOR VISIT
[Home] : at home, [unfilled] , at the time of the visit. [Medical Office: ([facility])___] : at the medical office located in  [Patient] : the patient [Self] : self [Follow-Up: _____] : a [unfilled] follow-up visit [This encounter was initiated by telehealth (audio with video) and converted to telephone (audio only) due to technical difficulties.] : This encounter was initiated by telehealth (audio with video) and converted to telephone (audio only) due to technical difficulties.

## 2024-01-24 ENCOUNTER — NON-APPOINTMENT (OUTPATIENT)
Age: 79
End: 2024-01-24

## 2024-03-06 RX ORDER — LOSARTAN POTASSIUM 100 MG/1
100 TABLET, FILM COATED ORAL DAILY
Qty: 1 | Refills: 1 | Status: ACTIVE | COMMUNITY
Start: 2021-02-22 | End: 1900-01-01

## 2024-05-10 ENCOUNTER — APPOINTMENT (OUTPATIENT)
Dept: HEART AND VASCULAR | Facility: CLINIC | Age: 79
End: 2024-05-10

## 2024-07-18 ENCOUNTER — NON-APPOINTMENT (OUTPATIENT)
Age: 79
End: 2024-07-18

## 2024-07-19 ENCOUNTER — APPOINTMENT (OUTPATIENT)
Dept: HEART AND VASCULAR | Facility: CLINIC | Age: 79
End: 2024-07-19
Payer: MEDICARE

## 2024-07-19 VITALS
HEIGHT: 71 IN | HEART RATE: 89 BPM | TEMPERATURE: 97.8 F | WEIGHT: 203 LBS | OXYGEN SATURATION: 96 % | DIASTOLIC BLOOD PRESSURE: 58 MMHG | BODY MASS INDEX: 28.42 KG/M2 | SYSTOLIC BLOOD PRESSURE: 114 MMHG

## 2024-07-19 DIAGNOSIS — E11.9 TYPE 2 DIABETES MELLITUS W/OUT COMPLICATIONS: ICD-10-CM

## 2024-07-19 DIAGNOSIS — I25.10 ATHEROSCLEROTIC HEART DISEASE OF NATIVE CORONARY ARTERY W/OUT ANGINA PECTORIS: ICD-10-CM

## 2024-07-19 DIAGNOSIS — I10 ESSENTIAL (PRIMARY) HYPERTENSION: ICD-10-CM

## 2024-07-19 PROCEDURE — 93000 ELECTROCARDIOGRAM COMPLETE: CPT

## 2024-07-19 PROCEDURE — G2211 COMPLEX E/M VISIT ADD ON: CPT

## 2024-07-19 PROCEDURE — 99214 OFFICE O/P EST MOD 30 MIN: CPT

## 2024-10-07 ENCOUNTER — MED ADMIN CHARGE (OUTPATIENT)
Age: 79
End: 2024-10-07

## 2024-10-07 ENCOUNTER — APPOINTMENT (OUTPATIENT)
Dept: HEART AND VASCULAR | Facility: CLINIC | Age: 79
End: 2024-10-07
Payer: MEDICARE

## 2024-10-07 VITALS
OXYGEN SATURATION: 96 % | HEIGHT: 71 IN | SYSTOLIC BLOOD PRESSURE: 99 MMHG | TEMPERATURE: 96.5 F | DIASTOLIC BLOOD PRESSURE: 63 MMHG | HEART RATE: 91 BPM

## 2024-10-07 VITALS — WEIGHT: 199 LBS | BODY MASS INDEX: 27.75 KG/M2 | SYSTOLIC BLOOD PRESSURE: 119 MMHG | DIASTOLIC BLOOD PRESSURE: 81 MMHG

## 2024-10-07 DIAGNOSIS — I10 ESSENTIAL (PRIMARY) HYPERTENSION: ICD-10-CM

## 2024-10-07 DIAGNOSIS — R74.8 ABNORMAL LEVELS OF OTHER SERUM ENZYMES: ICD-10-CM

## 2024-10-07 DIAGNOSIS — E11.9 TYPE 2 DIABETES MELLITUS W/OUT COMPLICATIONS: ICD-10-CM

## 2024-10-07 DIAGNOSIS — I25.10 ATHEROSCLEROTIC HEART DISEASE OF NATIVE CORONARY ARTERY W/OUT ANGINA PECTORIS: ICD-10-CM

## 2024-10-07 DIAGNOSIS — Z23 ENCOUNTER FOR IMMUNIZATION: ICD-10-CM

## 2024-10-07 DIAGNOSIS — I63.9 CEREBRAL INFARCTION, UNSPECIFIED: ICD-10-CM

## 2024-10-07 PROCEDURE — G2211 COMPLEX E/M VISIT ADD ON: CPT

## 2024-10-07 PROCEDURE — 99215 OFFICE O/P EST HI 40 MIN: CPT

## 2024-10-11 ENCOUNTER — NON-APPOINTMENT (OUTPATIENT)
Age: 79
End: 2024-10-11

## 2025-02-14 ENCOUNTER — APPOINTMENT (OUTPATIENT)
Dept: HEART AND VASCULAR | Facility: CLINIC | Age: 80
End: 2025-02-14
Payer: MEDICARE

## 2025-02-14 ENCOUNTER — NON-APPOINTMENT (OUTPATIENT)
Age: 80
End: 2025-02-14

## 2025-02-14 VITALS
BODY MASS INDEX: 27.3 KG/M2 | HEART RATE: 84 BPM | DIASTOLIC BLOOD PRESSURE: 58 MMHG | OXYGEN SATURATION: 95 % | TEMPERATURE: 97.3 F | HEIGHT: 71 IN | SYSTOLIC BLOOD PRESSURE: 94 MMHG | WEIGHT: 195 LBS

## 2025-02-14 DIAGNOSIS — I10 ESSENTIAL (PRIMARY) HYPERTENSION: ICD-10-CM

## 2025-02-14 DIAGNOSIS — I25.10 ATHEROSCLEROTIC HEART DISEASE OF NATIVE CORONARY ARTERY W/OUT ANGINA PECTORIS: ICD-10-CM

## 2025-02-14 DIAGNOSIS — E11.9 TYPE 2 DIABETES MELLITUS W/OUT COMPLICATIONS: ICD-10-CM

## 2025-02-14 PROCEDURE — 36415 COLL VENOUS BLD VENIPUNCTURE: CPT

## 2025-02-14 PROCEDURE — G2211 COMPLEX E/M VISIT ADD ON: CPT

## 2025-02-14 PROCEDURE — 93306 TTE W/DOPPLER COMPLETE: CPT

## 2025-02-14 PROCEDURE — 99213 OFFICE O/P EST LOW 20 MIN: CPT

## 2025-02-14 PROCEDURE — 93000 ELECTROCARDIOGRAM COMPLETE: CPT

## 2025-02-14 RX ORDER — TAMSULOSIN HYDROCHLORIDE 0.4 MG/1
0.4 CAPSULE ORAL
Refills: 0 | Status: ACTIVE | COMMUNITY

## 2025-02-18 LAB
ALBUMIN SERPL ELPH-MCNC: 3.9 G/DL
ALP BLD-CCNC: 115 U/L
ALT SERPL-CCNC: 14 U/L
ANION GAP SERPL CALC-SCNC: 20 MMOL/L
AST SERPL-CCNC: 16 U/L
BILIRUB SERPL-MCNC: 0.2 MG/DL
BUN SERPL-MCNC: 25 MG/DL
CALCIUM SERPL-MCNC: 9.8 MG/DL
CHLORIDE SERPL-SCNC: 104 MMOL/L
CHOLEST SERPL-MCNC: 141 MG/DL
CO2 SERPL-SCNC: 16 MMOL/L
CREAT SERPL-MCNC: 1.8 MG/DL
EGFR: 38 ML/MIN/1.73M2
ESTIMATED AVERAGE GLUCOSE: 120 MG/DL
GLUCOSE SERPL-MCNC: 117 MG/DL
HBA1C MFR BLD HPLC: 5.8 %
HCT VFR BLD CALC: 40.6 %
HDLC SERPL-MCNC: 47 MG/DL
HGB BLD-MCNC: 12.6 G/DL
LDLC SERPL CALC-MCNC: 69 MG/DL
MCHC RBC-ENTMCNC: 30.8 PG
MCHC RBC-ENTMCNC: 31 G/DL
MCV RBC AUTO: 99.3 FL
NONHDLC SERPL-MCNC: 94 MG/DL
PLATELET # BLD AUTO: 366 K/UL
POTASSIUM SERPL-SCNC: 5.2 MMOL/L
PROT SERPL-MCNC: 7 G/DL
RBC # BLD: 4.09 M/UL
RBC # FLD: 14.6 %
SODIUM SERPL-SCNC: 140 MMOL/L
TRIGL SERPL-MCNC: 146 MG/DL
WBC # FLD AUTO: 15.68 K/UL

## 2025-03-20 ENCOUNTER — APPOINTMENT (OUTPATIENT)
Dept: HEART AND VASCULAR | Facility: CLINIC | Age: 80
End: 2025-03-20
Payer: MEDICARE

## 2025-03-20 VITALS
HEART RATE: 96 BPM | DIASTOLIC BLOOD PRESSURE: 65 MMHG | WEIGHT: 194 LBS | TEMPERATURE: 97.5 F | HEIGHT: 71 IN | BODY MASS INDEX: 27.16 KG/M2 | OXYGEN SATURATION: 95 % | SYSTOLIC BLOOD PRESSURE: 100 MMHG

## 2025-03-20 DIAGNOSIS — E11.9 TYPE 2 DIABETES MELLITUS W/OUT COMPLICATIONS: ICD-10-CM

## 2025-03-20 DIAGNOSIS — I10 ESSENTIAL (PRIMARY) HYPERTENSION: ICD-10-CM

## 2025-03-20 DIAGNOSIS — I25.10 ATHEROSCLEROTIC HEART DISEASE OF NATIVE CORONARY ARTERY W/OUT ANGINA PECTORIS: ICD-10-CM

## 2025-03-20 DIAGNOSIS — I73.9 PERIPHERAL VASCULAR DISEASE, UNSPECIFIED: ICD-10-CM

## 2025-03-20 PROCEDURE — 99214 OFFICE O/P EST MOD 30 MIN: CPT

## 2025-03-20 RX ORDER — PREDNISONE 1 MG/1
1 TABLET ORAL
Refills: 0 | Status: ACTIVE | COMMUNITY
Start: 2025-03-20

## 2025-04-15 ENCOUNTER — APPOINTMENT (OUTPATIENT)
Dept: NEUROLOGY | Facility: CLINIC | Age: 80
End: 2025-04-15
Payer: MEDICARE

## 2025-04-15 VITALS
SYSTOLIC BLOOD PRESSURE: 129 MMHG | BODY MASS INDEX: 27.44 KG/M2 | WEIGHT: 196 LBS | OXYGEN SATURATION: 97 % | HEIGHT: 71 IN | HEART RATE: 95 BPM | TEMPERATURE: 98.2 F | DIASTOLIC BLOOD PRESSURE: 83 MMHG

## 2025-04-15 DIAGNOSIS — I67.9 CEREBROVASCULAR DISEASE, UNSPECIFIED: ICD-10-CM

## 2025-04-15 DIAGNOSIS — I63.9 CEREBRAL INFARCTION, UNSPECIFIED: ICD-10-CM

## 2025-04-15 DIAGNOSIS — G47.33 OBSTRUCTIVE SLEEP APNEA (ADULT) (PEDIATRIC): ICD-10-CM

## 2025-04-15 PROCEDURE — 99215 OFFICE O/P EST HI 40 MIN: CPT

## 2025-04-15 RX ORDER — ATORVASTATIN CALCIUM 80 MG/1
80 TABLET, FILM COATED ORAL
Qty: 90 | Refills: 3 | Status: ACTIVE | COMMUNITY
Start: 2025-04-15 | End: 1900-01-01

## 2025-04-15 RX ORDER — AMLODIPINE BESYLATE 2.5 MG/1
2.5 TABLET ORAL DAILY
Refills: 0 | Status: ACTIVE | COMMUNITY

## 2025-04-15 RX ORDER — EZETIMIBE 10 MG/1
10 TABLET ORAL
Qty: 90 | Refills: 3 | Status: ACTIVE | COMMUNITY
Start: 2025-04-15 | End: 1900-01-01

## 2025-04-15 RX ORDER — FINASTERIDE 1 MG/1
TABLET ORAL DAILY
Refills: 0 | Status: ACTIVE | COMMUNITY

## 2025-04-15 RX ORDER — LOSARTAN POTASSIUM 50 MG/1
50 TABLET, FILM COATED ORAL DAILY
Refills: 0 | Status: ACTIVE | COMMUNITY

## 2025-04-30 NOTE — PHYSICAL THERAPY INITIAL EVALUATION ADULT - DISCHARGE PLANNER MADE AWARE
PA needed for   tirzepatide (Zepbound) 2.5 mg/0.5 mL auto-injector     [facility] KEY: [id number]   yes

## 2025-05-07 ENCOUNTER — APPOINTMENT (OUTPATIENT)
Dept: MRI IMAGING | Facility: HOSPITAL | Age: 80
End: 2025-05-07

## 2025-05-08 ENCOUNTER — APPOINTMENT (OUTPATIENT)
Dept: MRI IMAGING | Facility: HOSPITAL | Age: 80
End: 2025-05-08

## 2025-05-14 ENCOUNTER — APPOINTMENT (OUTPATIENT)
Dept: SLEEP CENTER | Facility: HOME HEALTH | Age: 80
End: 2025-05-14

## 2025-06-24 NOTE — BRIEF OPERATIVE NOTE - BRIEF OP NOTE DRAINS
Spoke with patient- Patient was not recommended by his doctor to take baby aspirin (81mg). He chose to do this on his own. Informed patient that he should stop taking for 7 days before colonoscopy. Patient verbalized understanding, and is agreeable.    none

## 2025-07-15 ENCOUNTER — APPOINTMENT (OUTPATIENT)
Dept: MRI IMAGING | Facility: HOSPITAL | Age: 80
End: 2025-07-15

## 2025-07-15 ENCOUNTER — OUTPATIENT (OUTPATIENT)
Dept: OUTPATIENT SERVICES | Facility: HOSPITAL | Age: 80
LOS: 1 days | End: 2025-07-15
Payer: MEDICARE

## 2025-07-15 DIAGNOSIS — Z98.89 OTHER SPECIFIED POSTPROCEDURAL STATES: Chronic | ICD-10-CM

## 2025-07-15 DIAGNOSIS — Z86.79 PERSONAL HISTORY OF OTHER DISEASES OF THE CIRCULATORY SYSTEM: Chronic | ICD-10-CM

## 2025-07-15 DIAGNOSIS — M71.21 SYNOVIAL CYST OF POPLITEAL SPACE [BAKER], RIGHT KNEE: Chronic | ICD-10-CM

## 2025-07-15 DIAGNOSIS — Z95.1 PRESENCE OF AORTOCORONARY BYPASS GRAFT: Chronic | ICD-10-CM

## 2025-07-15 PROCEDURE — 70547 MR ANGIOGRAPHY NECK W/O DYE: CPT | Mod: 26

## 2025-07-15 PROCEDURE — 70547 MR ANGIOGRAPHY NECK W/O DYE: CPT

## 2025-07-15 PROCEDURE — 70544 MR ANGIOGRAPHY HEAD W/O DYE: CPT

## 2025-07-15 PROCEDURE — 70544 MR ANGIOGRAPHY HEAD W/O DYE: CPT | Mod: 26

## 2025-09-05 ENCOUNTER — NON-APPOINTMENT (OUTPATIENT)
Age: 80
End: 2025-09-05

## 2025-09-05 DIAGNOSIS — I65.1 OCCLUSION AND STENOSIS OF BASILAR ARTERY: ICD-10-CM

## 2025-09-16 ENCOUNTER — APPOINTMENT (OUTPATIENT)
Dept: NEUROSURGERY | Facility: CLINIC | Age: 80
End: 2025-09-16
Payer: MEDICARE

## 2025-09-16 VITALS
OXYGEN SATURATION: 97 % | SYSTOLIC BLOOD PRESSURE: 119 MMHG | BODY MASS INDEX: 27.16 KG/M2 | TEMPERATURE: 97.4 F | HEIGHT: 71 IN | HEART RATE: 86 BPM | RESPIRATION RATE: 18 BRPM | WEIGHT: 194 LBS | DIASTOLIC BLOOD PRESSURE: 75 MMHG

## 2025-09-16 DIAGNOSIS — I67.9 CEREBROVASCULAR DISEASE, UNSPECIFIED: ICD-10-CM

## 2025-09-16 PROCEDURE — 99205 OFFICE O/P NEW HI 60 MIN: CPT

## 2025-09-25 PROBLEM — E66.811 OBESITY, CLASS I, BMI 30-34.9: Status: RESOLVED | Noted: 2017-07-19 | Resolved: 2025-09-25

## 2025-09-25 PROBLEM — Z23 ENCOUNTER FOR IMMUNIZATION: Status: ACTIVE | Noted: 2023-10-23 | Resolved: 2025-10-09

## 2025-09-25 PROBLEM — Z76.89 ENCOUNTER TO ESTABLISH CARE: Status: ACTIVE | Noted: 2025-09-25
